# Patient Record
Sex: FEMALE | Race: WHITE | NOT HISPANIC OR LATINO | Employment: OTHER | URBAN - NONMETROPOLITAN AREA
[De-identification: names, ages, dates, MRNs, and addresses within clinical notes are randomized per-mention and may not be internally consistent; named-entity substitution may affect disease eponyms.]

---

## 2017-04-09 ENCOUNTER — APPOINTMENT (OUTPATIENT)
Dept: CT IMAGING | Facility: HOSPITAL | Age: 67
End: 2017-04-09

## 2017-04-09 ENCOUNTER — APPOINTMENT (OUTPATIENT)
Dept: GENERAL RADIOLOGY | Facility: HOSPITAL | Age: 67
End: 2017-04-09

## 2017-04-09 ENCOUNTER — HOSPITAL ENCOUNTER (INPATIENT)
Facility: HOSPITAL | Age: 67
LOS: 3 days | Discharge: NURSING, REHAB OR LONG-TERM CARE | End: 2017-04-12
Attending: EMERGENCY MEDICINE | Admitting: PSYCHIATRY & NEUROLOGY

## 2017-04-09 DIAGNOSIS — I63.312 CEREBROVASCULAR ACCIDENT (CVA) DUE TO THROMBOSIS OF LEFT MIDDLE CEREBRAL ARTERY (HCC): Primary | ICD-10-CM

## 2017-04-09 DIAGNOSIS — R13.10 DYSPHAGIA, UNSPECIFIED TYPE: ICD-10-CM

## 2017-04-09 DIAGNOSIS — R26.89 IMPAIRED GAIT AND MOBILITY: ICD-10-CM

## 2017-04-09 DIAGNOSIS — Z78.9 DECREASED ACTIVITIES OF DAILY LIVING (ADL): ICD-10-CM

## 2017-04-09 PROBLEM — I63.9 STROKE-IN-EVOLUTION SYNDROME (HCC): Status: ACTIVE | Noted: 2017-04-09

## 2017-04-09 LAB
ABO GROUP BLD: NORMAL
ALBUMIN SERPL-MCNC: 4.3 G/DL (ref 3.5–5)
ALBUMIN/GLOB SERPL: 1.3 G/DL (ref 1.1–2.5)
ALP SERPL-CCNC: 93 U/L (ref 24–120)
ALT SERPL W P-5'-P-CCNC: 21 U/L (ref 0–54)
ANION GAP SERPL CALCULATED.3IONS-SCNC: 16 MMOL/L (ref 4–13)
APTT PPP: 28.4 SECONDS (ref 24.1–34.8)
AST SERPL-CCNC: 33 U/L (ref 7–45)
BASOPHILS # BLD AUTO: 0.03 10*3/MM3 (ref 0–0.2)
BASOPHILS NFR BLD AUTO: 0.3 % (ref 0–2)
BILIRUB SERPL-MCNC: 0.6 MG/DL (ref 0.1–1)
BLD GP AB SCN SERPL QL: NEGATIVE
BUN BLD-MCNC: 23 MG/DL (ref 5–21)
BUN/CREAT SERPL: 22.3 (ref 7–25)
CALCIUM SPEC-SCNC: 9.5 MG/DL (ref 8.4–10.4)
CHLORIDE SERPL-SCNC: 99 MMOL/L (ref 98–110)
CO2 SERPL-SCNC: 26 MMOL/L (ref 24–31)
CREAT BLD-MCNC: 1.03 MG/DL (ref 0.5–1.4)
DEPRECATED RDW RBC AUTO: 40.3 FL (ref 40–54)
EOSINOPHIL # BLD AUTO: 0.09 10*3/MM3 (ref 0–0.7)
EOSINOPHIL NFR BLD AUTO: 0.8 % (ref 0–4)
ERYTHROCYTE [DISTWIDTH] IN BLOOD BY AUTOMATED COUNT: 12.7 % (ref 12–15)
GFR SERPL CREATININE-BSD FRML MDRD: 53 ML/MIN/1.73
GLOBULIN UR ELPH-MCNC: 3.2 GM/DL
GLUCOSE BLD-MCNC: 96 MG/DL (ref 70–100)
GLUCOSE BLDC GLUCOMTR-MCNC: 127 MG/DL (ref 70–130)
GLUCOSE BLDC GLUCOMTR-MCNC: 88 MG/DL (ref 70–130)
HCT VFR BLD AUTO: 43.6 % (ref 37–47)
HGB BLD-MCNC: 15.3 G/DL (ref 12–16)
HOLD SPECIMEN: NORMAL
HOLD SPECIMEN: NORMAL
IMM GRANULOCYTES # BLD: 0.03 10*3/MM3 (ref 0–0.03)
IMM GRANULOCYTES NFR BLD: 0.3 % (ref 0–5)
INR PPP: 0.91 (ref 0.91–1.09)
LYMPHOCYTES # BLD AUTO: 3.68 10*3/MM3 (ref 0.72–4.86)
LYMPHOCYTES NFR BLD AUTO: 30.8 % (ref 15–45)
MCH RBC QN AUTO: 30.4 PG (ref 28–32)
MCHC RBC AUTO-ENTMCNC: 35.1 G/DL (ref 33–36)
MCV RBC AUTO: 86.5 FL (ref 82–98)
MONOCYTES # BLD AUTO: 0.78 10*3/MM3 (ref 0.19–1.3)
MONOCYTES NFR BLD AUTO: 6.5 % (ref 4–12)
NEUTROPHILS # BLD AUTO: 7.35 10*3/MM3 (ref 1.87–8.4)
NEUTROPHILS NFR BLD AUTO: 61.3 % (ref 39–78)
PLATELET # BLD AUTO: 354 10*3/MM3 (ref 130–400)
PMV BLD AUTO: 9.5 FL (ref 6–12)
POTASSIUM BLD-SCNC: 4 MMOL/L (ref 3.5–5.3)
PROT SERPL-MCNC: 7.5 G/DL (ref 6.3–8.7)
PROTHROMBIN TIME: 12.5 SECONDS (ref 11.9–14.6)
RBC # BLD AUTO: 5.04 10*6/MM3 (ref 4.2–5.4)
RH BLD: POSITIVE
SODIUM BLD-SCNC: 141 MMOL/L (ref 135–145)
TROPONIN I SERPL-MCNC: <0.012 NG/ML (ref 0–0.03)
WBC NRBC COR # BLD: 11.96 10*3/MM3 (ref 4.8–10.8)
WHOLE BLOOD HOLD SPECIMEN: NORMAL
WHOLE BLOOD HOLD SPECIMEN: NORMAL

## 2017-04-09 PROCEDURE — 25010000002 LORAZEPAM PER 2 MG: Performed by: PSYCHIATRY & NEUROLOGY

## 2017-04-09 PROCEDURE — 85730 THROMBOPLASTIN TIME PARTIAL: CPT | Performed by: EMERGENCY MEDICINE

## 2017-04-09 PROCEDURE — 70450 CT HEAD/BRAIN W/O DYE: CPT

## 2017-04-09 PROCEDURE — 25010000002 ONDANSETRON PER 1 MG: Performed by: PSYCHIATRY & NEUROLOGY

## 2017-04-09 PROCEDURE — 82962 GLUCOSE BLOOD TEST: CPT

## 2017-04-09 PROCEDURE — 99291 CRITICAL CARE FIRST HOUR: CPT

## 2017-04-09 PROCEDURE — 86901 BLOOD TYPING SEROLOGIC RH(D): CPT | Performed by: EMERGENCY MEDICINE

## 2017-04-09 PROCEDURE — 72132 CT LUMBAR SPINE W/DYE: CPT

## 2017-04-09 PROCEDURE — 84484 ASSAY OF TROPONIN QUANT: CPT | Performed by: EMERGENCY MEDICINE

## 2017-04-09 PROCEDURE — 85025 COMPLETE CBC W/AUTO DIFF WBC: CPT | Performed by: EMERGENCY MEDICINE

## 2017-04-09 PROCEDURE — 93005 ELECTROCARDIOGRAM TRACING: CPT | Performed by: EMERGENCY MEDICINE

## 2017-04-09 PROCEDURE — 25010000002 ALTEPLASE PER 1 MG: Performed by: EMERGENCY MEDICINE

## 2017-04-09 PROCEDURE — 3E03317 INTRODUCTION OF OTHER THROMBOLYTIC INTO PERIPHERAL VEIN, PERCUTANEOUS APPROACH: ICD-10-PCS | Performed by: PSYCHIATRY & NEUROLOGY

## 2017-04-09 PROCEDURE — 71010 HC CHEST PA OR AP: CPT

## 2017-04-09 PROCEDURE — 72129 CT CHEST SPINE W/DYE: CPT

## 2017-04-09 PROCEDURE — 74174 CTA ABD&PLVS W/CONTRAST: CPT

## 2017-04-09 PROCEDURE — 99223 1ST HOSP IP/OBS HIGH 75: CPT | Performed by: PSYCHIATRY & NEUROLOGY

## 2017-04-09 PROCEDURE — 0 IOPAMIDOL PER 1 ML: Performed by: EMERGENCY MEDICINE

## 2017-04-09 PROCEDURE — 85610 PROTHROMBIN TIME: CPT | Performed by: EMERGENCY MEDICINE

## 2017-04-09 PROCEDURE — 93010 ELECTROCARDIOGRAM REPORT: CPT | Performed by: INTERNAL MEDICINE

## 2017-04-09 PROCEDURE — 99285 EMERGENCY DEPT VISIT HI MDM: CPT

## 2017-04-09 PROCEDURE — 86900 BLOOD TYPING SEROLOGIC ABO: CPT | Performed by: EMERGENCY MEDICINE

## 2017-04-09 PROCEDURE — 25010000002 ONDANSETRON PER 1 MG: Performed by: EMERGENCY MEDICINE

## 2017-04-09 PROCEDURE — 93005 ELECTROCARDIOGRAM TRACING: CPT

## 2017-04-09 PROCEDURE — 25010000002 HYDROMORPHONE PER 4 MG: Performed by: EMERGENCY MEDICINE

## 2017-04-09 PROCEDURE — 86850 RBC ANTIBODY SCREEN: CPT | Performed by: EMERGENCY MEDICINE

## 2017-04-09 PROCEDURE — 80053 COMPREHEN METABOLIC PANEL: CPT | Performed by: EMERGENCY MEDICINE

## 2017-04-09 RX ORDER — SODIUM CHLORIDE 0.9 % (FLUSH) 0.9 %
1-10 SYRINGE (ML) INJECTION AS NEEDED
Status: DISCONTINUED | OUTPATIENT
Start: 2017-04-09 | End: 2017-04-12 | Stop reason: HOSPADM

## 2017-04-09 RX ORDER — SODIUM CHLORIDE 9 MG/ML
100 INJECTION, SOLUTION INTRAVENOUS ONCE
Status: COMPLETED | OUTPATIENT
Start: 2017-04-09 | End: 2017-04-09

## 2017-04-09 RX ORDER — ASPIRIN 300 MG/1
300 SUPPOSITORY RECTAL DAILY
Status: DISCONTINUED | OUTPATIENT
Start: 2017-04-10 | End: 2017-04-12 | Stop reason: HOSPADM

## 2017-04-09 RX ORDER — LORAZEPAM 2 MG/ML
0.5 INJECTION INTRAMUSCULAR
Status: DISCONTINUED | OUTPATIENT
Start: 2017-04-09 | End: 2017-04-11

## 2017-04-09 RX ORDER — ALBUTEROL SULFATE 90 UG/1
1 AEROSOL, METERED RESPIRATORY (INHALATION) EVERY 4 HOURS PRN
COMMUNITY
End: 2017-04-09

## 2017-04-09 RX ORDER — ATORVASTATIN CALCIUM 40 MG/1
80 TABLET, FILM COATED ORAL NIGHTLY
Status: DISCONTINUED | OUTPATIENT
Start: 2017-04-09 | End: 2017-04-12 | Stop reason: HOSPADM

## 2017-04-09 RX ORDER — ASPIRIN 325 MG
325 TABLET ORAL DAILY
Status: DISCONTINUED | OUTPATIENT
Start: 2017-04-10 | End: 2017-04-12 | Stop reason: HOSPADM

## 2017-04-09 RX ORDER — HYDRALAZINE HYDROCHLORIDE 20 MG/ML
20 INJECTION INTRAMUSCULAR; INTRAVENOUS EVERY 6 HOURS PRN
Status: DISCONTINUED | OUTPATIENT
Start: 2017-04-09 | End: 2017-04-12 | Stop reason: HOSPADM

## 2017-04-09 RX ORDER — ONDANSETRON 2 MG/ML
4 INJECTION INTRAMUSCULAR; INTRAVENOUS
Status: DISCONTINUED | OUTPATIENT
Start: 2017-04-09 | End: 2017-04-11

## 2017-04-09 RX ORDER — ONDANSETRON 2 MG/ML
4 INJECTION INTRAMUSCULAR; INTRAVENOUS ONCE
Status: COMPLETED | OUTPATIENT
Start: 2017-04-09 | End: 2017-04-09

## 2017-04-09 RX ORDER — SODIUM CHLORIDE 0.9 % (FLUSH) 0.9 %
10 SYRINGE (ML) INJECTION AS NEEDED
Status: DISCONTINUED | OUTPATIENT
Start: 2017-04-09 | End: 2017-04-11

## 2017-04-09 RX ADMIN — ALTEPLASE 48.28 MG: KIT at 15:19

## 2017-04-09 RX ADMIN — HYDROMORPHONE HYDROCHLORIDE 1 MG: 1 INJECTION, SOLUTION INTRAMUSCULAR; INTRAVENOUS; SUBCUTANEOUS at 16:15

## 2017-04-09 RX ADMIN — ONDANSETRON HYDROCHLORIDE 4 MG: 2 SOLUTION INTRAMUSCULAR; INTRAVENOUS at 16:05

## 2017-04-09 RX ADMIN — ALTEPLASE 5.36 MG: KIT at 15:17

## 2017-04-09 RX ADMIN — IOPAMIDOL 150 ML: 755 INJECTION, SOLUTION INTRAVENOUS at 16:42

## 2017-04-09 RX ADMIN — LORAZEPAM 0.5 MG: 2 INJECTION, SOLUTION INTRAMUSCULAR; INTRAVENOUS at 22:36

## 2017-04-09 RX ADMIN — SODIUM CHLORIDE 100 ML/HR: 9 INJECTION, SOLUTION INTRAVENOUS at 15:25

## 2017-04-09 RX ADMIN — ONDANSETRON HYDROCHLORIDE 4 MG: 2 SOLUTION INTRAMUSCULAR; INTRAVENOUS at 19:18

## 2017-04-09 NOTE — PLAN OF CARE
Problem: Patient Care Overview (Adult)  Goal: Plan of Care Review  Outcome: Ongoing (interventions implemented as appropriate)    04/09/17 1808   Outcome Evaluation   Outcome Summary/Follow up Plan Patient admitted with ischemic stroke. tPA administered in ED. Stopped at 1608 for possible bleed (abd and low back pain). CT abd/pelvis negative. Stil some sensory loss to right side of face. Strength returning to right side. Complaints of nausea without vomiting. Urinates via bedpan       Goal: Adult Individualization and Mutuality  Outcome: Ongoing (interventions implemented as appropriate)  Goal: Discharge Needs Assessment  Outcome: Ongoing (interventions implemented as appropriate)    Problem: Stroke (Ischemic) (Adult)  Goal: Signs and Symptoms of Listed Potential Problems Will be Absent or Manageable (Stroke)  Outcome: Ongoing (interventions implemented as appropriate)    Problem: Fall Risk (Adult)  Goal: Identify Related Risk Factors and Signs and Symptoms  Outcome: Ongoing (interventions implemented as appropriate)  Goal: Absence of Falls  Outcome: Ongoing (interventions implemented as appropriate)

## 2017-04-09 NOTE — ED NOTES
PT PRESENTS TO ER CO RIGHT SIDED NUMBNESS STARTED 1130 WHILE COOKING.  REPORTS NUMBNESS TO RIGHT SIDE OF FACE, RUE, & RLE.  WEAKNESS NOTED IN RIGHT .       Masha Lau RN  04/09/17 9093

## 2017-04-09 NOTE — ED NOTES
Pt became very anxious. Diaphoretic.  Nausea.  Pt co lower abd pain radiating into back.  Pt reports that she cant feel her right leg.  Pt raises leg from bed but falls to bed immediately.  Dr wynne called to bedside. TPA stopped per Dr Wynne order.  Verbal order for pain med & CT scan.  Pt to be transferred to CT Stat per Dr Wynne.     Masha Lau RN  04/09/17 4504       Masha Lau RN  04/09/17 1205

## 2017-04-09 NOTE — ED PROVIDER NOTES
Subjective   HPI Comments: i Just saw the pt at 2 15 pm has onset at 1130 -12 pm with co rt sided weakness and numbness    pt was at home and had onset of numbness in the rt face and rt arm and leg did not co weakness no loc and then stayed at home about two hours after the onset of symptoms and then came to the er . With co numbness . On my examination it was fet that she had rt sided upper ext weakness which was barely perceptable . ot has ho ha so this could be a varient migraine vs a cva thus a code stroke was called     Patient is a 66 y.o. female presenting with strokes.   Stroke   Presenting symptoms: sensory loss and weakness    Presenting symptoms: no change in level of consciousness, no confusion, no headaches and no language symptoms    Onset quality:  Sudden  Last known well:  1130am -12 pm  Timing:  Constant  Progression:  Unchanged  Similar to previous episodes: no    Associated symptoms: no chest pain, no difficulty swallowing, no dizziness, no facial pain, no fall, no fever, no hearing loss, no bladder incontinence, no nausea, no neck pain, no paresthesias, no seizures, no tinnitus, no vertigo and no vomiting        Review of Systems   Constitutional: Negative for fever.   HENT: Negative.  Negative for hearing loss, tinnitus and trouble swallowing.    Eyes: Negative.    Respiratory: Negative.  Negative for cough and chest tightness.    Cardiovascular: Negative.  Negative for chest pain.   Gastrointestinal: Negative.  Negative for abdominal pain, anal bleeding, nausea and vomiting.   Genitourinary: Negative for bladder incontinence, dysuria, flank pain, hematuria and vaginal bleeding.   Musculoskeletal: Negative.  Negative for back pain, gait problem, joint swelling and neck pain.   Skin: Negative.    Neurological: Positive for weakness and numbness. Negative for dizziness, vertigo, tremors, seizures, syncope, facial asymmetry, speech difficulty, light-headedness, headaches and paresthesias.    Psychiatric/Behavioral: Negative for confusion.   All other systems reviewed and are negative.      Past Medical History:   Diagnosis Date   • Hypertension        No Known Allergies    Past Surgical History:   Procedure Laterality Date   • GALLBLADDER SURGERY     • TUBAL ABDOMINAL LIGATION         Family History   Problem Relation Age of Onset   • COPD Mother    • Kidney disease Father    • Cancer Sister    • Stroke Brother        Social History     Social History   • Marital status:      Spouse name: N/A   • Number of children: N/A   • Years of education: N/A     Social History Main Topics   • Smoking status: Never Smoker   • Smokeless tobacco: None   • Alcohol use No   • Drug use: No   • Sexual activity: Not Asked     Other Topics Concern   • None     Social History Narrative           Objective   Physical Exam   Constitutional: She is oriented to person, place, and time. She appears well-developed.  Non-toxic appearance. No distress.   HENT:   Head: Normocephalic and atraumatic.   Mouth/Throat: Uvula is midline and mucous membranes are normal.   Eyes: Lids are normal. Pupils are equal, round, and reactive to light. Lids are everted and swept, no foreign bodies found.   Neck: Trachea normal, normal range of motion, full passive range of motion without pain and phonation normal. Neck supple. Normal carotid pulses and no JVD present. Carotid bruit is not present. No rigidity. No tracheal deviation present. No Brudzinski's sign and no Kernig's sign noted. No thyromegaly present.   Cardiovascular: Normal rate, regular rhythm, normal heart sounds, intact distal pulses and normal pulses.    Pulmonary/Chest: Effort normal and breath sounds normal. No stridor. No apnea and no tachypnea. No respiratory distress. She has no wheezes. She has no rales.   Abdominal: Soft. Normal appearance, normal aorta and bowel sounds are normal. She exhibits no distension and no mass. There is no hepatosplenomegaly. There is no  tenderness. There is no rebound and no guarding.   Musculoskeletal: Normal range of motion.        Thoracic back: Normal. She exhibits no tenderness and no bony tenderness.        Lumbar back: Normal. She exhibits no tenderness and no bony tenderness.       Vascular Status -  Her exam exhibits right foot vasculature normal. Her exam exhibits no right foot edema. Her exam exhibits left foot vasculature normal. Her exam exhibits no left foot edema.  Neurological: She is alert and oriented to person, place, and time. She has normal reflexes. She is not disoriented. She displays normal reflexes. A sensory deficit is present. No cranial nerve deficit. She exhibits normal muscle tone. Coordination normal. GCS eye subscore is 4. GCS verbal subscore is 5. GCS motor subscore is 6.   Reflex Scores:       Tricep reflexes are 2+ on the right side and 2+ on the left side.       Bicep reflexes are 2+ on the right side and 2+ on the left side.       Patellar reflexes are 2+ on the right side and 2+ on the left side.       Achilles reflexes are 2+ on the right side and 2+ on the left side.  Rt arm numbness and weakness to rt arm    Skin: Skin is warm, dry and intact. She is not diaphoretic. No cyanosis. No pallor. Nails show no clubbing.   Nursing note and vitals reviewed.      Procedures         ED Course  ED Course   Comment By Time   Nsr Romero Wynne MD 04/09 1428   Discussed with DR Hayder Wynne MD 04/09 1436   Ct per radiology neg Romero Wynne MD 04/09 1438   Dr Singletary seeing the pt tpa will be initiated Romero Wynne MD 04/09 8695   Patient was seen by Dr. Singletary and she requests the patient to get TPA for consent had been Signed by  the patient Romero Wynne MD 04/09 1591   The nurses us informed me that the patient having abdominal pain and back pain and the her right leg has developed more weakness as a TPA of the right leg and right arm weakness and numbness had gone away and now she is having back pain and  abdominal pain there is no history of abdominal pain or back pain and the patient came into the ED she has some history of chronic back discomfort but no instrumentation via stop the [facility] center for a stat CT of the abdominal pelvis with IV contrast aortic dissection and a CT of the L-spine and T-spine to make sure she does not have any epidural hematomas Romero Wynne MD 04/09 1612   No evidence of aortic dissection or aneurysm. There is moderate  stenosis involving the proximal segment of the SMA.  2. There is a 1.5 cm hypodense nodule. This could potentially represent  a diverticulum off the gastric fundus but appears to be in close  proximity to the tail of the pancreas. Follow-up with either ultrasound  or repeat CT imaging with oral contrast is recommended.   3. Hepatic cyst of the liver.  Discussed with family and need for follow up stressed Romero Wynne MD 04/09 1712   Pts family was informed as they were going up to the unit with the pt that cts were neg for any acute abnormality and has a nodule may be in pancreas that needs dedicated evaluation as out pt Romero Wynne MD 04/09 1730                  [facility]    Final diagnoses:   Cerebrovascular accident (CVA) due to thrombosis of left middle cerebral artery            Romero Wynne MD  04/09/17 1505       Romero Wynne MD  04/09/17 1722       Romero Wynne MD  04/09/17 1730

## 2017-04-09 NOTE — H&P
Neurology History & Physical    Indication for Admission: Stroke in evolutoin    History of present illness:      The patient present with symptoms of right sided numbness and tingling that started at 11:30 am, face >arm> leg. No visual changes, no language or speech problems, no nausea, no vertigo. She did not fall.      She has had no recent surgeries.    Patient has history of HTN and dyslipidemia and has not been on statin therapy for several years. She does not smoke.    Past medical history  HTN, dyslipidemia    No known allergies    Social History  She is , she does not use tobacco    Family History  COPD, kidney disease, cancer, stroke    Review of Systems  Review of Systems  Negative for chest pain, shortness of breath, GI or  complaints.    Vital Signs   Temp:  [98.2 °F (36.8 °C)] 98.2 °F (36.8 °C)  Heart Rate:  [108] 108  Resp:  [20] 20  BP: (148)/(99) 148/99    General Exam:  HEENT:  No rash, no sign trauma  CVS:  Regular rate and rhythm.  No murmurs, carotids with bruits  Lungs:  Clear to auscultation  Extremities:  No signs of peripheral edema  Skin:  No rashes or bruises    Neurologic Exam:  Alert, oriented  and fluent  Cranial nerves intact  Visual fields intact  Pupils symmetric and reactive  No ptosis or nystagmus  Power and coordination normal in all extremities  Decreased light touch on right hemibody  Gait not assesed  Plantar responses flexor      Results Review:  Lab Results (last 7 days)     Procedure Component Value Units Date/Time    CBC Auto Differential [[id number]]  (Abnormal) Collected:  04/09/17 1351    Specimen:  Blood Updated:  04/09/17 1443     WBC 11.96 (H) 10*3/mm3      RBC 5.04 10*6/mm3      Hemoglobin 15.3 g/dL      Hematocrit 43.6 %      MCV 86.5 fL      MCH 30.4 pg      MCHC 35.1 g/dL      RDW 12.7 %      RDW-SD 40.3 fl      MPV 9.5 fL      Platelets 354 10*3/mm3      Neutrophil % 61.3 %      Lymphocyte % 30.8 %      Monocyte % 6.5 %      Eosinophil % 0.8 %       Basophil % 0.3 %      Immature Grans % 0.3 %      Neutrophils, Absolute 7.35 10*3/mm3      Lymphocytes, Absolute 3.68 10*3/mm3      Monocytes, Absolute 0.78 10*3/mm3      Eosinophils, Absolute 0.09 10*3/mm3      Basophils, Absolute 0.03 10*3/mm3      Immature Grans, Absolute 0.03 10*3/mm3     Protime-INR [[id number]]  (Normal) Collected:  04/09/17 1351    Specimen:  Blood Updated:  04/09/17 1448     Protime 12.5 Seconds      INR 0.91    aPTT [[id number]]  (Normal) Collected:  04/09/17 1351    Specimen:  Blood Updated:  04/09/17 1448     PTT 28.4 seconds     Comprehensive Metabolic Panel [[id number]]  (Abnormal) Collected:  04/09/17 1351    Specimen:  Blood Updated:  04/09/17 1450     Glucose 96 mg/dL      BUN 23 (H) mg/dL      Creatinine 1.03 mg/dL      Sodium 141 mmol/L      Potassium 4.0 mmol/L      Chloride 99 mmol/L      CO2 26.0 mmol/L      Calcium 9.5 mg/dL      Total Protein 7.5 g/dL      Albumin 4.30 g/dL      ALT (SGPT) 21 U/L      AST (SGOT) 33 U/L      Alkaline Phosphatase 93 U/L      Total Bilirubin 0.6 mg/dL      eGFR Non African Amer 53 (L) mL/min/1.73      Globulin 3.2 gm/dL      A/G Ratio 1.3 g/dL      BUN/Creatinine Ratio 22.3     Anion Gap 16.0 (H) mmol/L     POC Glucose Fingerstick [[id number]]  (Normal) Collected:  04/09/17 1438    Specimen:  Blood Updated:  04/09/17 1459     Glucose 88 mg/dL       : [id number] Sid Flanagan LMeter ID: [id number]       Troponin [[id number]]  (Normal) Collected:  04/09/17 1351    Specimen:  Blood Updated:  04/09/17 1501     Troponin I <0.012 ng/mL         Head CT shows no acute abnormality (images reviewed independently)    Impression:    Acute left hemispheric stroke in evolution  Patient meets inclusion and exclusion criteria for Intravenous TPA  Risks discussed with patient and significant other, and they agree to proceed    Plan:    TPA protocol  Hydralazine as needed for SBP> 190, DBP >100    Kera Singletary MD  04/09/17  3:03 PM

## 2017-04-10 ENCOUNTER — APPOINTMENT (OUTPATIENT)
Dept: CARDIOLOGY | Facility: HOSPITAL | Age: 67
End: 2017-04-10
Attending: PSYCHIATRY & NEUROLOGY

## 2017-04-10 ENCOUNTER — APPOINTMENT (OUTPATIENT)
Dept: CT IMAGING | Facility: HOSPITAL | Age: 67
End: 2017-04-10

## 2017-04-10 ENCOUNTER — APPOINTMENT (OUTPATIENT)
Dept: MRI IMAGING | Facility: HOSPITAL | Age: 67
End: 2017-04-10

## 2017-04-10 ENCOUNTER — APPOINTMENT (OUTPATIENT)
Dept: ULTRASOUND IMAGING | Facility: HOSPITAL | Age: 67
End: 2017-04-10

## 2017-04-10 LAB
ARTICHOKE IGE QN: 81 MG/DL (ref 0–99)
BH CV ECHO MEAS - AO MAX PG (FULL): 3.6 MMHG
BH CV ECHO MEAS - AO MAX PG: 6.2 MMHG
BH CV ECHO MEAS - AO MEAN PG (FULL): 2 MMHG
BH CV ECHO MEAS - AO MEAN PG: 3 MMHG
BH CV ECHO MEAS - AO ROOT AREA (BSA CORRECTED): 1.7
BH CV ECHO MEAS - AO ROOT AREA: 5.3 CM^2
BH CV ECHO MEAS - AO ROOT DIAM: 2.6 CM
BH CV ECHO MEAS - AO V2 MAX: 124 CM/SEC
BH CV ECHO MEAS - AO V2 MEAN: 81.5 CM/SEC
BH CV ECHO MEAS - AO V2 VTI: 20.9 CM
BH CV ECHO MEAS - AVA(I,A): 2.2 CM^2
BH CV ECHO MEAS - AVA(I,D): 2.2 CM^2
BH CV ECHO MEAS - AVA(V,A): 2 CM^2
BH CV ECHO MEAS - AVA(V,D): 2 CM^2
BH CV ECHO MEAS - BSA(HAYCOCK): 1.6 M^2
BH CV ECHO MEAS - BSA: 1.5 M^2
BH CV ECHO MEAS - BZI_BMI: 26.1 KILOGRAMS/M^2
BH CV ECHO MEAS - BZI_METRIC_HEIGHT: 149.9 CM
BH CV ECHO MEAS - BZI_METRIC_WEIGHT: 58.5 KG
BH CV ECHO MEAS - CONTRAST EF 4CH: 60.3 ML/M^2
BH CV ECHO MEAS - EDV(CUBED): 64 ML
BH CV ECHO MEAS - EDV(MOD-SP4): 67.3 ML
BH CV ECHO MEAS - EDV(TEICH): 70 ML
BH CV ECHO MEAS - EF(CUBED): 68.9 %
BH CV ECHO MEAS - EF(MOD-SP4): 60.3 %
BH CV ECHO MEAS - EF(TEICH): 61.1 %
BH CV ECHO MEAS - ESV(CUBED): 19.9 ML
BH CV ECHO MEAS - ESV(MOD-SP4): 26.7 ML
BH CV ECHO MEAS - ESV(TEICH): 27.3 ML
BH CV ECHO MEAS - FS: 32.3 %
BH CV ECHO MEAS - IVS/LVPW: 0.98
BH CV ECHO MEAS - IVSD: 1 CM
BH CV ECHO MEAS - LA DIMENSION: 2.7 CM
BH CV ECHO MEAS - LA/AO: 1
BH CV ECHO MEAS - LAT PEAK E' VEL: 6.7 CM/SEC
BH CV ECHO MEAS - LV DIASTOLIC VOL/BSA (35-75): 44 ML/M^2
BH CV ECHO MEAS - LV MASS(C)D: 134.3 GRAMS
BH CV ECHO MEAS - LV MASS(C)DI: 87.8 GRAMS/M^2
BH CV ECHO MEAS - LV MAX PG: 2.5 MMHG
BH CV ECHO MEAS - LV MEAN PG: 1 MMHG
BH CV ECHO MEAS - LV SYSTOLIC VOL/BSA (12-30): 17.4 ML/M^2
BH CV ECHO MEAS - LV V1 MAX: 79.1 CM/SEC
BH CV ECHO MEAS - LV V1 MEAN: 51.4 CM/SEC
BH CV ECHO MEAS - LV V1 VTI: 14.6 CM
BH CV ECHO MEAS - LVIDD: 4 CM
BH CV ECHO MEAS - LVIDS: 2.7 CM
BH CV ECHO MEAS - LVLD AP4: 6.1 CM
BH CV ECHO MEAS - LVLS AP4: 5.1 CM
BH CV ECHO MEAS - LVOT AREA (M): 3.1 CM^2
BH CV ECHO MEAS - LVOT AREA: 3.1 CM^2
BH CV ECHO MEAS - LVOT DIAM: 2 CM
BH CV ECHO MEAS - LVPWD: 1.1 CM
BH CV ECHO MEAS - MED PEAK E' VEL: 5.98 CM/SEC
BH CV ECHO MEAS - MV A MAX VEL: 94.1 CM/SEC
BH CV ECHO MEAS - MV DEC TIME: 0.24 SEC
BH CV ECHO MEAS - MV E MAX VEL: 54.8 CM/SEC
BH CV ECHO MEAS - MV E/A: 0.58
BH CV ECHO MEAS - RAP SYSTOLE: 10 MMHG
BH CV ECHO MEAS - RVSP: 26 MMHG
BH CV ECHO MEAS - SI(AO): 72.5 ML/M^2
BH CV ECHO MEAS - SI(CUBED): 28.8 ML/M^2
BH CV ECHO MEAS - SI(LVOT): 30 ML/M^2
BH CV ECHO MEAS - SI(MOD-SP4): 26.5 ML/M^2
BH CV ECHO MEAS - SI(TEICH): 27.9 ML/M^2
BH CV ECHO MEAS - SV(AO): 111 ML
BH CV ECHO MEAS - SV(CUBED): 44.1 ML
BH CV ECHO MEAS - SV(LVOT): 45.9 ML
BH CV ECHO MEAS - SV(MOD-SP4): 40.6 ML
BH CV ECHO MEAS - SV(TEICH): 42.7 ML
BH CV ECHO MEAS - TR MAX VEL: 200 CM/SEC
CHOLEST SERPL-MCNC: 169 MG/DL (ref 130–200)
GLUCOSE BLDC GLUCOMTR-MCNC: 103 MG/DL (ref 70–130)
GLUCOSE BLDC GLUCOMTR-MCNC: 195 MG/DL (ref 70–130)
HBA1C MFR BLD: 5.4 %
HDLC SERPL-MCNC: 55 MG/DL
LDLC/HDLC SERPL: 1.56 {RATIO}
LEFT ATRIUM VOLUME INDEX: 16.8 ML/M2
LEFT ATRIUM VOLUME: 25.7 CM3
TRIGL SERPL-MCNC: 140 MG/DL (ref 0–149)

## 2017-04-10 PROCEDURE — 80061 LIPID PANEL: CPT | Performed by: PSYCHIATRY & NEUROLOGY

## 2017-04-10 PROCEDURE — 70551 MRI BRAIN STEM W/O DYE: CPT

## 2017-04-10 PROCEDURE — G8996 SWALLOW CURRENT STATUS: HCPCS

## 2017-04-10 PROCEDURE — 25010000002 LORAZEPAM PER 2 MG: Performed by: PSYCHIATRY & NEUROLOGY

## 2017-04-10 PROCEDURE — 93306 TTE W/DOPPLER COMPLETE: CPT

## 2017-04-10 PROCEDURE — 70450 CT HEAD/BRAIN W/O DYE: CPT

## 2017-04-10 PROCEDURE — 82962 GLUCOSE BLOOD TEST: CPT

## 2017-04-10 PROCEDURE — 93306 TTE W/DOPPLER COMPLETE: CPT | Performed by: INTERNAL MEDICINE

## 2017-04-10 PROCEDURE — 93880 EXTRACRANIAL BILAT STUDY: CPT | Performed by: SURGERY

## 2017-04-10 PROCEDURE — 93880 EXTRACRANIAL BILAT STUDY: CPT

## 2017-04-10 PROCEDURE — 99233 SBSQ HOSP IP/OBS HIGH 50: CPT | Performed by: PSYCHIATRY & NEUROLOGY

## 2017-04-10 PROCEDURE — 92610 EVALUATE SWALLOWING FUNCTION: CPT

## 2017-04-10 PROCEDURE — 83036 HEMOGLOBIN GLYCOSYLATED A1C: CPT | Performed by: PSYCHIATRY & NEUROLOGY

## 2017-04-10 PROCEDURE — 25010000002 ONDANSETRON PER 1 MG: Performed by: PSYCHIATRY & NEUROLOGY

## 2017-04-10 PROCEDURE — G8997 SWALLOW GOAL STATUS: HCPCS

## 2017-04-10 RX ORDER — ACETAMINOPHEN 325 MG/1
650 TABLET ORAL EVERY 6 HOURS PRN
Status: DISCONTINUED | OUTPATIENT
Start: 2017-04-10 | End: 2017-04-12 | Stop reason: HOSPADM

## 2017-04-10 RX ORDER — LISINOPRIL 10 MG/1
10 TABLET ORAL
Status: DISCONTINUED | OUTPATIENT
Start: 2017-04-10 | End: 2017-04-12 | Stop reason: HOSPADM

## 2017-04-10 RX ADMIN — DESMOPRESSIN ACETATE 80 MG: 0.2 TABLET ORAL at 21:43

## 2017-04-10 RX ADMIN — LORAZEPAM 0.5 MG: 2 INJECTION, SOLUTION INTRAMUSCULAR; INTRAVENOUS at 10:25

## 2017-04-10 RX ADMIN — ACETAMINOPHEN 650 MG: 325 TABLET ORAL at 19:48

## 2017-04-10 RX ADMIN — ACETAMINOPHEN 650 MG: 325 TABLET ORAL at 10:25

## 2017-04-10 RX ADMIN — ONDANSETRON HYDROCHLORIDE 4 MG: 2 SOLUTION INTRAMUSCULAR; INTRAVENOUS at 10:30

## 2017-04-10 RX ADMIN — ASPIRIN 325 MG: 325 TABLET, COATED ORAL at 17:20

## 2017-04-10 RX ADMIN — LORAZEPAM 0.5 MG: 2 INJECTION, SOLUTION INTRAMUSCULAR; INTRAVENOUS at 19:49

## 2017-04-10 RX ADMIN — LISINOPRIL 10 MG: 10 TABLET ORAL at 11:04

## 2017-04-10 NOTE — PROGRESS NOTES
Neurology Progress Note      Subjective     Subjective:  Patient reports still has abnormal sensation of the right body.  She did develop anxiety and apparent increased weakness last night.  Nursing called and stat head CT was performed.  CT was negative  and  lorazepam was then used for anxiety.      Medications:  Current Facility-Administered Medications   Medication Dose Route Frequency Provider Last Rate Last Dose   • aspirin tablet 325 mg  325 mg Oral Daily Kera Singletary MD        Or   • aspirin suppository 300 mg  300 mg Rectal Daily Kera Singletary MD       • atorvastatin (LIPITOR) tablet 80 mg  80 mg Oral Nightly Kera Singletary MD       • hydrALAZINE (APRESOLINE) injection 20 mg  20 mg Intravenous Q6H PRN Kera Singletary MD       • LORazepam (ATIVAN) injection 0.5 mg  0.5 mg Intravenous Q2H PRN Kera Singletary MD   0.5 mg at 04/09/17 2236   • ondansetron (ZOFRAN) injection 4 mg  4 mg Intravenous Q3H PRN Kera Singletary MD   4 mg at 04/09/17 1918   • sodium chloride 0.9 % flush 1-10 mL  1-10 mL Intravenous PRN Kera Singletary MD       • sodium chloride 0.9 % flush 10 mL  10 mL Intravenous PRN Romero Wynne MD       • sodium chloride 0.9 % flush 10 mL  10 mL Intravenous PRN Romero Wynne MD           Review of Systems:   -A 14 point review of systems is completed and is negative.      Objective      Vital Signs  Temp:  [95.8 °F (35.4 °C)-98.2 °F (36.8 °C)] 97.9 °F (36.6 °C)  Heart Rate:  [] 101  Resp:  [12-25] 15  BP: (104-178)/() 142/84    Physical Exam:    HEENT:  No rash  CVS:  Regular rate and rhythm.    Chest: Lungs are clear  Abdomen: Soft and nontender  Extremities:  No signs of peripheral edema    Neurologic Exam:  Alert oriented and fluent  Face is symmetric, no dysarthria  Right upper extremity 5 -/5  Right lower extremity 5 -/5  Left upper and lower extremities 5/5     Results Review:    Repeat head CT showed no acute abnormalities.  Lipid panel pending  Glucose  127      Assessment/Plan       Impression:  Acute left hemispheric stroke status post TPA with still some residual right hemibody findings  Risk factors for stroke include history of dyslipidemia and  Mild hypertension is also likely contributing to stroke risks    Plan:  Brain MRI  Echocardiogram  Carotid Dopplers  Aspirin 325 mg daily  Lipitor 80 mg daily  Start lisinopril 10 mg daily  PT/OT/speech therapy      Kera Singletary MD  04/10/17  9:33 AM

## 2017-04-10 NOTE — DISCHARGE PLACEMENT REQUEST
"JOVITA BURTON [phone]    Azar Capellan (66 y.o. Female)     Date of Birth Social Security Number Address Home Phone MRN    1950  [address]  [facility] [address] [phone] [id number]    [place] Marital Status          Non-Shinto        Admission Date Admission Type Admitting Provider Attending Provider Department, Room/Bed    4/9/17 Emergency Kera Singletary MD [place], [place] B, MD [facility] INTENSIVE CARE, I001/1    Discharge Date Discharge Disposition Discharge Destination                      Attending Provider: Kera Singletary MD     Allergies:  No Known Allergies    Isolation:  None   Infection:  None   Code Status:  FULL    Ht:  59\" (149.9 cm)   Wt:  129 lb (58.5 kg)    Admission Cmt:  None   Principal Problem:  None                Active Insurance as of 4/9/2017     Primary Coverage     Payor Plan Insurance Group Employer/Plan Group    MEDICARE MEDICARE A & B      Payor Plan Address Payor Plan Phone Number Effective From Effective To    [address] [phone] 7/1/2013     [place] [address]       Subscriber Name Subscriber Birth Date Member ID       AZAR CAPELLAN 1950 [id number]           Secondary Coverage     Payor Plan Insurance Group Employer/Plan Group    [employer] [employer] [place]      Payor Plan Address Payor Plan Phone Number Effective From Effective To    [employer] [place] [phone] 8/20/2013     [place] [id number]       Subscriber Name Subscriber Birth Date Member ID       AZAR CAPELLAN 1950 [id number]                 Emergency Contacts      (Rel.) Home Phone Work Phone Mobile Phone    Lc Velázquez (Son) -- -- [phone]              "

## 2017-04-10 NOTE — NURSING NOTE
"Pt stated, \" The numbness and tingling is improving and seeming like it is coming and going now. It was worse than is was when I first came in traveling all the way to the inside of my nose, but now I feel that it is getting better but it does not go completely away.\"      The patient has calmed down some and is trying to close her eyes now, as was before she was hollering out and moving all around the bed and having a hard time telling me what was wrong.  "

## 2017-04-10 NOTE — PLAN OF CARE
Problem: Patient Care Overview (Adult)  Goal: Plan of Care Review  Outcome: Ongoing (interventions implemented as appropriate)    04/10/17 1839   Patient Care Overview   Progress improving   Coping/Psychosocial Response Interventions   Plan Of Care Reviewed With Pt A&O x4. 24 hr MRI/CT scan performed. Paraesthesias remain in right extremities. Adequate UOP and oral intake. Vital signs stable.         Goal: Adult Individualization and Mutuality  Outcome: Ongoing (interventions implemented as appropriate)  Goal: Discharge Needs Assessment  Outcome: Ongoing (interventions implemented as appropriate)    04/10/17 1839   Discharge Needs Assessment   Readmission Within The Last 30 Days no previous admission in last 30 days         Problem: Stroke (Ischemic) (Adult)  Goal: Signs and Symptoms of Listed Potential Problems Will be Absent or Manageable (Stroke)  Outcome: Ongoing (interventions implemented as appropriate)    04/10/17 1839   Stroke (Ischemic)   Problems Assessed (Stroke (Ischemic)/TIA) all   Problems Present (Stroke (Ischemic)/TIA) muscle tone abnormal;motor/sensory impairment;situational response         Problem: Fall Risk (Adult)  Goal: Identify Related Risk Factors and Signs and Symptoms  Outcome: Ongoing (interventions implemented as appropriate)    04/10/17 1839   Fall Risk   Fall Risk: Related Risk Factors age-related changes;gait/mobility problems;neuro disease/injury;sensory deficits;environment unfamiliar       Goal: Absence of Falls  Outcome: Ongoing (interventions implemented as appropriate)    04/10/17 1839   Fall Risk (Adult)   Absence of Falls making progress toward outcome

## 2017-04-10 NOTE — PROGRESS NOTES
Acute Care - Speech Language Pathology   Swallow Initial Evaluation [facility]     Patient Name: Donya Capellan  : 1950  MRN: [id number]  Today's Date: 4/10/2017        Referring Physician: Dr. Jasso      Admit Date: 2017    SPEECH-LANGUAGE PATHOLOGY EVALUATION - SWALLOW  Subjective: The patient was seen on this date for a Clinical Swallow evaluation.  Patient was alert and cooperative.    Objective: Textures given included thin liquid, nectar thick liquid, honey thick liquid, puree consistency and regular consistency.  Assessment: Difficulties were noted with nectar thick liquid, puree consistency and regular consistency, characterized by Pt was noted to have a pre-existing cough prior to PO trials. The pt stated that this was occuring due to her asthma. The pt completed a full range of consistencies with a delayed cough with pureed and nectar thick consistencies 1x. 2x multiple swallow with regular solid consistencies. ST feels that the 2x delayed coughs were not related to aspiration as the pt did not have any vocal changes or any other overt s/s of aspiration with other PO trials.   SLP Findings:  Patient presents with mild oral dysphagia, without esophageal component.   Recommendations: Diet Textures: thin liquid, regular consistency food.  Medications should be taken whole with thin liquids. May have water and ice between meals after oral care, under staff or family supervision and with the recommended strategies for safe swallowing.   Recommended Strategies: Upright for PO. Oral care before breakfast, after all meals and PRN.  Dysphagia therapy is recommended.   Matthew Noland MS, CFY-SLP 4/10/2017 9:22 AM    Visit Dx:     ICD-10-CM ICD-9-CM   1. Cerebrovascular accident (CVA) due to thrombosis of left middle cerebral artery I63.312 434.01   2. 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 Jasso  -CS        Pertinent History Of Current Problem CT of head w/o contrast 4/09/17- No acute findings. CXR 4/09/17- Lungs clear.  -CS        Current Diet Limitations NPO  -CS        Precautions/Limitations, Vision WFL with corrective lenses  -CS        Precautions/Limitations, Hearing WFL  -CS        Prior Level of Function- Communication functional in all spheres  -CS        Prior Level of Function- Swallowing no diet consistency restrictions  -CS        Plans/Goals Discussed With patient  -CS        Barriers to Rehab none identified  -CS        Clinical Impression    Patient's Goals For Discharge patient did not state  -CS        SLP Swallowing Diagnosis mild dysphagia   WFL  -CS        Rehab Potential/Prognosis, Swallowing good, to achieve stated therapy goals  -CS        Criteria for Skilled Therapeutic Interventions Met skilled criteria for dysphagia intervention met  -CS        FCM, Swallowing 6-->Level 6  -CS        Therapy Frequency PRN  -CS        Predicted Duration Therapy Interv (days) until discharge  -CS        Expected Duration Therapy Session (min) 15-30 minutes  -CS        SLP Diet Recommendation regular textures;thin liquids  -CS        Recommended Feeding/Eating Techniques alternate between small bites and sips of  food/liquid;maintain upright posture during/after eating for 30 mins  -CS        SLP Rec. for Method of Medication Administration meds whole with thin liquid  -CS        Monitor For Signs Of Aspiration cough;gurgly voice;throat clearing  -CS        Anticipated Discharge Disposition home with assist  -CS        Pain Assessment    Pain Assessment No/denies pain  -CS        Oral Motor Structure and Function    Dentition Assessment present and adequate  -CS        Secretion Management WNL/WFL  -CS        Mucosal Quality moist, healthy  -CS        Velar Elevation WFL (within functional limits)  -CS        Volitional Swallow mild to moderate difficulties initiating volitional swallow  -CS        Volitional Cough no difficulties initiating volitional cough  -CS        Oral Musculature General Assessment oral labial impairment  -CS        Oral Labial Strength and Mobility right labial droop;other (see comments)   Slight right sided weakness  -CS        General Feeding/Swallowing Observations    Current Feeding Method NPO  -CS        Respiratory Support Currently in Use nasal cannula in use  -CS        Observations of Self Feeding Skills appropriate self feeding skills observed  -CS        Observations of Posture During Feeding Upright in bed  -CS        Clinical Swallow Exam    Mode of Presentation fed by clinician;self fed;spoon;straw  -CS        Oral Phase Results impaired oral phase, signs of dysfunction present  -CS        Pharyngeal Phase Results no signs/symptoms of pharyngeal impairment  -CS        Summary of Clinical Exam Pt was noted to have a pre-existing cough prior to PO trials. The pt stated that the was occuring due to her asthma. The pt complleted a full range of consistencies with a delayed cough per trial of pureed and nectar thick consistencies. 2x multiple swallow with regular solid consistencies. ST feels that the 2x delayed coughs were not related to aspiration as the pt did not have any vocal changes  or any other overt s/s with other PO trials.  -CS        Swallow Recommendations    Eating Assistance no assistance needed with self eating  -CS        Oral Care oral care with toothbrush and dentifrice BID and PRN  -CS        Modified Eating Strategies upright positioning 90 degrees  -CS        Other Recommendations regular;thin;meds whole  -CS        Recommended Diet regular textures;thin liquids  -CS          User Key  (r) = Recorded By, (t) = Taken By, (c) = Cosigned By    Initials Name Effective Dates     Matthew Noland MS, CFY-SLP 08/02/16 -         EDUCATION  The patient has been educated in the following areas:   Dysphagia (Swallowing Impairment).    SLP Recommendation and Plan  SLP Swallowing Diagnosis: mild dysphagia (WFL)  SLP Diet Recommendation: regular textures, thin liquids  Recommended Feeding/Eating Techniques: alternate between small bites and sips of food/liquid, maintain upright posture during/after eating for 30 mins  SLP Rec. for Method of Medication Administration: meds whole with thin liquid  Monitor For Signs Of Aspiration: cough, gurgly voice, throat clearing     Criteria for Skilled Therapeutic Interventions Met: skilled criteria for dysphagia intervention met  Anticipated Discharge Disposition: home with assist  Rehab Potential/Prognosis, Swallowing: good, to achieve stated therapy goals  Therapy Frequency: PRN             Plan of Care Review  Progress: no change (Initial evaluation)  Outcome Summary/Follow up Plan: CBSE completed. ST recommends a regular diet with thin liquids. RN to monitor for increased congestion. ST will follow for diet tolerance.          IP SLP Goals       04/10/17 0912          Safely Consume Diet    Safely Consume Diet- SLP, Date Established 04/10/17  -CS      Safely Consume Diet- SLP, Time to Achieve by discharge  -CS      Safely Consume Diet- SLP, Additional Goal Pt will tolerate recommended diet w/o any overt s/s of aspiration.  -CS      Safely Consume Diet-  SLP, Outcome goal ongoing  -CS        User Key  (r) = Recorded By, (t) = Taken By, (c) = Cosigned By    Initials Name Provider Type    SOFIA Noland MS, CFY-SLP Speech and Language Pathologist             SLP Outcome Measures (last 72 hours)      SLP Outcome Measures       04/10/17 0900          SLP Outcome Measures    Outcome Measure Used? Adult NOMS  -CS      OTHER    SLP Diagnoses Dysphagia  -CS      FCM Scores    FCM Chosen Swallowing  -CS      Swallowing FCM Score 6  -CS        User Key  (r) = Recorded By, (t) = Taken By, (c) = Cosigned By    Initials Name Effective Dates     Matthew Noland MS, RYLEE-SLP 08/02/16 -            Time Calculation:         Time Calculation- SLP       04/10/17 0920          Time Calculation- SLP    SLP Start Time 0815  -CS      SLP Stop Time 0920  -      SLP Time Calculation (min) 65 min  -CS      SLP Received On 04/10/17  -CS      SLP Goal Re-Cert Due Date 04/20/17  -        User Key  (r) = Recorded By, (t) = Taken By, (c) = Cosigned By    Initials Name Provider Type    SOFIA Noland MS, CFY-SLP Speech and Language Pathologist          Therapy Charges for Today     Code Description Service Date Service Provider Modifiers Qty    21725924903 HC ST SWALLOWING CURRENT STATUS 4/10/2017 Matthew Noland MS CFY-SLP GN, CI 1    46225816312 HC ST SWALLOWING PROJECTED 4/10/2017 Matthew Noland MS CFY-SLP GN, CI 1    75682850250 HC ST EVAL ORAL PHARYNG SWALLOW 4 4/10/2017 Matthew Noland MS CFSTANFORD-SLP GN 1          SLP G-Codes  SLP NOMS Used?: Yes  Functional Limitations: Swallowing  Swallow Current Status (): At least 1 percent but less than 20 percent impaired, limited or restricted  Swallow Goal Status (): At least 1 percent but less than 20 percent impaired, limited or restricted    Matthew Noland MS, RYLEE-SLP  4/10/2017

## 2017-04-10 NOTE — PLAN OF CARE
Problem: Patient Care Overview (Adult)  Goal: Adult Individualization and Mutuality  Outcome: Ongoing (interventions implemented as appropriate)  Pt. With slight improvement of right sided deficit over the course of the shift. OR x4, speech slight slurring, numbness of right face, neck arm and leg noted.  equal PERRL,     04/10/17 0512   Individualization   Patient Specific Goals home self-care         Problem: Stroke (Ischemic) (Adult)  Goal: Signs and Symptoms of Listed Potential Problems Will be Absent or Manageable (Stroke)  Outcome: Ongoing (interventions implemented as appropriate)    Problem: Fall Risk (Adult)  Goal: Identify Related Risk Factors and Signs and Symptoms  Outcome: Ongoing (interventions implemented as appropriate)  Goal: Absence of Falls  Outcome: Ongoing (interventions implemented as appropriate)

## 2017-04-10 NOTE — NURSING NOTE
Went for stat ct of head at 2120 and returned by 2145 for neurological decline. With assist of 2 RNs at bedside.

## 2017-04-10 NOTE — PLAN OF CARE
Problem: Patient Care Overview (Adult)  Goal: Plan of Care Review  Outcome: Ongoing (interventions implemented as appropriate)    04/10/17 0912   Outcome Evaluation   Outcome Summary/Follow up Plan CBSE completed. ST recommends a regular diet with thin liquids. RN to monitor for increased congestion. ST will follow for diet tolerance.   Patient Care Overview   Progress no change  (Initial evaluation)         Problem: Inpatient SLP  Goal: Dysphagia- Patient will safely consume diet as per recommendation with no signs/symptoms of aspiration  Outcome: Ongoing (interventions implemented as appropriate)    04/10/17 0912   Safely Consume Diet   Safely Consume Diet- SLP, Date Established 04/10/17   Safely Consume Diet- SLP, Time to Achieve by discharge   Safely Consume Diet- SLP, Additional Goal Pt will tolerate recommended diet w/o any overt s/s of aspiration.   Safely Consume Diet- SLP, Outcome goal ongoing

## 2017-04-10 NOTE — PROGRESS NOTES
Discharge Planning Assessment  [facility]     Patient Name: Donya Capellan  MRN: [id number]  Today's Date: 4/10/2017    Admit Date: 4/9/2017          Discharge Needs Assessment       04/10/17 0925    Living Environment    Lives With significant other    Living Arrangements apartment;house    Provides Primary Care For no one    Quality Of Family Relationships supportive    Able to Return to Prior Living Arrangements other (see comments)   Agreeable to be referred to [facility]    Discharge Needs Assessment    Concerns To Be Addressed basic needs concerns;home safety concerns    Anticipated Changes Related to Illness inability to care for self    Equipment Currently Used at Home none    Discharge Facility/Level Of Care Needs rehabilitation facility    Transportation Available car;family or friend will provide            Discharge Plan       04/10/17 0928    Case Management/Social Work Plan    Plan [facility]    Additional Comments Spoke with pt and significant other in room to assess for home needs. Pt lives at home with significant other and was independent prior to this illness onset.  Spoke with her about [facility], she is agreeable for referral to be given in case she needs to go there at discharge.  Will inform Nestor from [facility] of referral today [phone].      04/10/17 0949    Case Management/Social Work Plan    Plan [facility]    Additional Comments Spoke with pt and significant other in room to assess for home needs.  Pt lives at home with significant other and was independent before this illness.  Spoke with her about [facility]        Discharge Placement     No information found                Demographic Summary     None            Functional Status     None            Psychosocial     None            Abuse/Neglect     None            Legal     None            Substance Abuse     None            Patient Forms     None          BARNEY Harper

## 2017-04-11 LAB
GLUCOSE BLDC GLUCOMTR-MCNC: 115 MG/DL (ref 70–130)
GLUCOSE BLDC GLUCOMTR-MCNC: 90 MG/DL (ref 70–130)

## 2017-04-11 PROCEDURE — G8987 SELF CARE CURRENT STATUS: HCPCS | Performed by: OCCUPATIONAL THERAPIST

## 2017-04-11 PROCEDURE — 92526 ORAL FUNCTION THERAPY: CPT

## 2017-04-11 PROCEDURE — 97165 OT EVAL LOW COMPLEX 30 MIN: CPT | Performed by: OCCUPATIONAL THERAPIST

## 2017-04-11 PROCEDURE — G8988 SELF CARE GOAL STATUS: HCPCS | Performed by: OCCUPATIONAL THERAPIST

## 2017-04-11 PROCEDURE — G8978 MOBILITY CURRENT STATUS: HCPCS

## 2017-04-11 PROCEDURE — 94760 N-INVAS EAR/PLS OXIMETRY 1: CPT

## 2017-04-11 PROCEDURE — 97535 SELF CARE MNGMENT TRAINING: CPT | Performed by: OCCUPATIONAL THERAPIST

## 2017-04-11 PROCEDURE — 82962 GLUCOSE BLOOD TEST: CPT

## 2017-04-11 PROCEDURE — 99233 SBSQ HOSP IP/OBS HIGH 50: CPT | Performed by: PSYCHIATRY & NEUROLOGY

## 2017-04-11 PROCEDURE — 97162 PT EVAL MOD COMPLEX 30 MIN: CPT

## 2017-04-11 PROCEDURE — 25010000002 LORAZEPAM PER 2 MG: Performed by: PSYCHIATRY & NEUROLOGY

## 2017-04-11 PROCEDURE — 94640 AIRWAY INHALATION TREATMENT: CPT

## 2017-04-11 PROCEDURE — G8979 MOBILITY GOAL STATUS: HCPCS

## 2017-04-11 PROCEDURE — 97110 THERAPEUTIC EXERCISES: CPT | Performed by: OCCUPATIONAL THERAPIST

## 2017-04-11 RX ORDER — MONTELUKAST SODIUM 10 MG/1
10 TABLET ORAL NIGHTLY
Status: DISCONTINUED | OUTPATIENT
Start: 2017-04-11 | End: 2017-04-12 | Stop reason: HOSPADM

## 2017-04-11 RX ORDER — NORTRIPTYLINE HYDROCHLORIDE 10 MG/1
40 CAPSULE ORAL NIGHTLY
Status: DISCONTINUED | OUTPATIENT
Start: 2017-04-11 | End: 2017-04-12 | Stop reason: HOSPADM

## 2017-04-11 RX ORDER — BUDESONIDE 0.25 MG/2ML
0.25 INHALANT ORAL
Status: DISCONTINUED | OUTPATIENT
Start: 2017-04-11 | End: 2017-04-12 | Stop reason: HOSPADM

## 2017-04-11 RX ORDER — LORAZEPAM 2 MG/ML
0.5 INJECTION INTRAMUSCULAR EVERY 4 HOURS PRN
Status: DISCONTINUED | OUTPATIENT
Start: 2017-04-11 | End: 2017-04-12 | Stop reason: HOSPADM

## 2017-04-11 RX ADMIN — DESMOPRESSIN ACETATE 80 MG: 0.2 TABLET ORAL at 20:18

## 2017-04-11 RX ADMIN — ASPIRIN 325 MG: 325 TABLET, COATED ORAL at 09:59

## 2017-04-11 RX ADMIN — LISINOPRIL 10 MG: 10 TABLET ORAL at 09:59

## 2017-04-11 RX ADMIN — MONTELUKAST SODIUM 10 MG: 10 TABLET ORAL at 20:18

## 2017-04-11 RX ADMIN — BUDESONIDE 0.25 MG: 0.25 INHALANT RESPIRATORY (INHALATION) at 20:55

## 2017-04-11 RX ADMIN — NORTRIPTYLINE HYDROCHLORIDE 40 MG: 10 CAPSULE ORAL at 20:18

## 2017-04-11 RX ADMIN — LORAZEPAM 0.5 MG: 2 INJECTION INTRAMUSCULAR; INTRAVENOUS at 19:56

## 2017-04-11 NOTE — PLAN OF CARE
Problem: Patient Care Overview (Adult)  Goal: Plan of Care Review  Outcome: Ongoing (interventions implemented as appropriate)    04/11/17 0922   Outcome Evaluation   Outcome Summary/Follow up Plan OT initial eval completed. Pt supervision for bed mobility with HOB elevated and bed rails. Pt CGA x2 for t/f with HHA x2. Pt min-CGA x2 with HHAx2 for functional mobility. Pt set up assist for grooming in fowlers/long sitting with minimal spillage while brushing teeth. Pt cga/supervision to adjust slipper socks. Skilled OT recommeneded to address adls, t/f, functional mobility, bed mobility, safety, coordination, strength and sensation. Recommended d/c home with assist/HH v. inpatient rehab, pending pt progress.   Patient Care Overview   Progress progress toward functional goals as expected   Coping/Psychosocial Response Interventions   Plan Of Care Reviewed With patient         Problem: Inpatient Occupational Therapy  Goal: Bed Mobility Goal LTG- OT  Outcome: Ongoing (interventions implemented as appropriate)    04/11/17 0922   Bed Mobility OT LTG   Bed Mobility OT LTG, Date Established 04/11/17   Bed Mobility OT LTG, Time to Achieve by discharge   Bed Mobility OT LTG, Activity Type all bed mobility   Bed Mobility OT LTG, [place] Level independent       Goal: Strength Goal LTG- OT  Outcome: Ongoing (interventions implemented as appropriate)    04/11/17 0922   Strength OT LTG   Strength Goal OT LTG, Date Established 04/11/17   Strength Goal OT LTG, Time to Achieve by discharge   Strength Goal OT LTG, Functional Goal Pt will independently maintin BUE AROM HEP to increase strength to 4+/5 and increase independence in ADLs.       Goal: Bathing Goal LTG- OT  Outcome: Ongoing (interventions implemented as appropriate)    04/11/17 0922   Bathing OT LTG   Bathing Goal OT LTG, Date Established 04/11/17   Bathing Goal OT LTG, Time to Achieve by discharge   Bathing Goal OT LTG, Activity Type upper body bathing;lower body  bathing   Bathing Goal OT LTG, [place] Level conditional independence   Bathing Goal OT LTG, Assist Device sponge, long handled;grab bars;tub bench with back  (AE PRN)       Goal: LB Dressing Goal LTG- OT  Outcome: Ongoing (interventions implemented as appropriate)    04/11/17 0922   LB Dressing OT LTG   LB Dressing Goal OT LTG, Date Established 04/11/17   LB Dressing Goal OT LTG, Time to Achieve by discharge   LB Dressing Goal OT LTG, [place] Level conditional independence   LB Dressing Goal OT LTG, Adaptive Equipment reacher;shoe horn, long handled;sock-aid  (AE PRN)       Goal: Coordination Goal LTG- OT  Outcome: Ongoing (interventions implemented as appropriate)    04/11/17 0922   Coordination OT LTG   Coordination OT LTG, Date Established 04/11/17   Coordination OT LTG, Time to Achieve by discharge   Coordination OT LTG, Activity Type FM written ex program;GM written ex program;FM task;GM task   Coordination OT LTG, [place] Level independent

## 2017-04-11 NOTE — PLAN OF CARE
Problem: Patient Care Overview (Adult)  Goal: Plan of Care Review  Outcome: Ongoing (interventions implemented as appropriate)    04/11/17 6057   Outcome Evaluation   Outcome Summary/Follow up Plan pt stil remains to have some rigidity and ataxia in right side, continues to complain about sever back pain and restlessness. possible floor today   Patient Care Overview   Progress no change   Coping/Psychosocial Response Interventions   Plan Of Care Reviewed With patient         Problem: Stroke (Ischemic) (Adult)  Goal: Signs and Symptoms of Listed Potential Problems Will be Absent or Manageable (Stroke)  Outcome: Ongoing (interventions implemented as appropriate)    Problem: Fall Risk (Adult)  Goal: Identify Related Risk Factors and Signs and Symptoms  Outcome: Ongoing (interventions implemented as appropriate)  Goal: Absence of Falls  Outcome: Ongoing (interventions implemented as appropriate)

## 2017-04-11 NOTE — THERAPY DISCHARGE NOTE
Acute Care - Speech Language Pathology   Swallow Eval/Discharge [facility]     Patient Name: Donya Capellan  : 1950  MRN: [id number]  Today's Date: 2017  Onset of Illness/Injury or Date of Surgery Date: 17     Referring Physician: Dr. Jasso      Admit Date: 2017  Pt completed 3 trials of regular solids and 5 trials of thin liquid w/o any overt s/s of aspiration. ST recommends pt continue regular diet with thin liquids. ST is signing off at this time as ST services are no longer warranted. RN to monitor for increased congestion. MD to re-consult if an issue arises which requires ST services.  Matthew Noland MS, CFY-SLP 2017 2:07 PM    Visit Dx:    ICD-10-CM ICD-9-CM   1. Cerebrovascular accident (CVA) due to thrombosis of left middle cerebral artery I63.312 434.01   2. Dysphagia, unspecified type R13.10 787.20   3. Decreased activities of daily living (ADL) Z78.9 V49.89   4. 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 Jasso  -CS        Pertinent History Of Current Problem CT of head w/o contrast 17- No acute findings. CXR 17- Lungs clear.  -CS        Current Diet Limitations NPO  -CS         Precautions/Limitations, Vision WFL with corrective lenses  -CS        Precautions/Limitations, Hearing WFL  -CS        Prior Level of Function- Communication functional in all spheres  -CS        Prior Level of Function- Swallowing no diet consistency restrictions  -CS        Plans/Goals Discussed With patient  -CS        Barriers to Rehab none identified  -CS        Clinical Impression    Patient's Goals For Discharge patient did not state  -CS        SLP Swallowing Diagnosis mild dysphagia   WFL  -CS        Rehab Potential/Prognosis, Swallowing good, to achieve stated therapy goals  -CS        Criteria for Skilled Therapeutic Interventions Met skilled criteria for dysphagia intervention met  -CS        FCM, Swallowing 6-->Level 6  -CS        Therapy Frequency PRN  -CS        Predicted Duration Therapy Interv (days) until discharge  -CS        Expected Duration Therapy Session (min) 15-30 minutes  -CS        SLP Diet Recommendation regular textures;thin liquids  -CS        Recommended Feeding/Eating Techniques alternate between small bites and sips of food/liquid;maintain upright posture during/after eating for 30 mins  -CS        SLP Rec. for Method of Medication Administration meds whole with thin liquid  -CS        Monitor For Signs Of Aspiration cough;gurgly voice;throat clearing  -CS        Anticipated Discharge Disposition home with assist  -CS        Pain Assessment    Pain Assessment No/denies pain  -CS        Oral Motor Structure and Function    Dentition Assessment present and adequate  -CS        Secretion Management WNL/WFL  -CS        Mucosal Quality moist, healthy  -CS        Velar Elevation WFL (within functional limits)  -CS        Volitional Swallow mild to moderate difficulties initiating volitional swallow  -CS        Volitional Cough no difficulties initiating volitional cough  -CS        Oral Musculature General Assessment oral labial impairment  -CS        Oral Labial Strength and Mobility  right labial droop;other (see comments)   Slight right sided weakness  -CS        General Feeding/Swallowing Observations    Current Feeding Method NPO  -CS        Respiratory Support Currently in Use nasal cannula in use  -CS        Observations of Self Feeding Skills appropriate self feeding skills observed  -CS        Observations of Posture During Feeding Upright in bed  -CS        Clinical Swallow Exam    Mode of Presentation fed by clinician;self fed;spoon;straw  -CS        Oral Phase Results impaired oral phase, signs of dysfunction present  -CS        Pharyngeal Phase Results no signs/symptoms of pharyngeal impairment  -CS        Summary of Clinical Exam Pt was noted to have a pre-existing cough prior to PO trials. The pt stated that the was occuring due to her asthma. The pt complleted a full range of consistencies with a delayed cough per trial of pureed and nectar thick consistencies. 2x multiple swallow with regular solid consistencies. ST feels that the 2x delayed coughs were not related to aspiration as the pt did not have any vocal changes or any other overt s/s with other PO trials.  -CS        Swallow Recommendations    Eating Assistance no assistance needed with self eating  -CS        Oral Care oral care with toothbrush and dentifrice BID and PRN  -CS        Modified Eating Strategies upright positioning 90 degrees  -CS        Other Recommendations regular;thin;meds whole  -CS        Recommended Diet regular textures;thin liquids  -CS          User Key  (r) = Recorded By, (t) = Taken By, (c) = Cosigned By    Initials Name Effective Dates    CS Matthew Noland MS, CFY-SLP 08/02/16 -         EDUCATION  The patient has been educated in the following areas:   Dysphagia (Swallowing Impairment).    SLP Recommendation and Plan  SLP Swallowing Diagnosis: mild dysphagia (WFL)  SLP Diet Recommendation: regular textures, thin liquids  Recommended Feeding/Eating Techniques: alternate between small bites and  sips of food/liquid, maintain upright posture during/after eating for 30 mins  SLP Rec. for Method of Medication Administration: meds whole with thin liquid  Monitor For Signs Of Aspiration: cough, gurgly voice, throat clearing     Criteria for Skilled Therapeutic Interventions Met: skilled criteria for dysphagia intervention met  Anticipated Discharge Disposition: home  Rehab Potential/Prognosis, Swallowing: good, to achieve stated therapy goals  Therapy Frequency: PRN             Plan of Care Review  Plan Of Care Reviewed With: patient  Progress: improving  Outcome Summary/Follow up Plan: Pt to continue regular diet with thin liquids. ST signing off at this time as services are no longer warranted. MD to re-consult if an issue arises which requires ST services.          IP SLP Goals       04/11/17 1402 04/10/17 0912       Safely Consume Diet    Safely Consume Diet- SLP, Date Established  04/10/17  -CS     Safely Consume Diet- SLP, Time to Achieve  by discharge  -CS     Safely Consume Diet- SLP, Additional Goal  Pt will tolerate recommended diet w/o any overt s/s of aspiration.  -CS     Safely Consume Diet- SLP, Date Goal Reviewed 04/11/17  -CS      Safely Consume Diet- SLP, Outcome goal met  -CS goal ongoing  -CS       User Key  (r) = Recorded By, (t) = Taken By, (c) = Cosigned By    Initials Name Provider Type    CS Matthew Noland MS, CFY-SLP Speech and Language Pathologist             SLP Outcome Measures (last 72 hours)      SLP Outcome Measures       04/10/17 0900          SLP Outcome Measures    Outcome Measure Used? Adult NOMS  -CS      OTHER    SLP Diagnoses Dysphagia  -CS      FCM Scores    FCM Chosen Swallowing  -CS      Swallowing FCM Score 6  -CS        User Key  (r) = Recorded By, (t) = Taken By, (c) = Cosigned By    Initials Name Effective Dates    SOFIA Noland MS, CFY-SLP 08/02/16 -            Time Calculation:         Time Calculation- SLP       04/11/17 1404          Time Calculation- SLP    SLP  Start Time 1048  -      SLP Stop Time 1105  -CS      SLP Time Calculation (min) 17 min  -CS      SLP Received On 04/11/17  -        User Key  (r) = Recorded By, (t) = Taken By, (c) = Cosigned By    Initials Name Provider Type     Matthew Noland MS, CFY-SLP Speech and Language Pathologist          Therapy Charges for Today     Code Description Service Date Service Provider Modifiers Qty    23126961234 HC ST SWALLOWING CURRENT STATUS 4/10/2017 Matthew Noland MS, CFY-SLP GN, CI 1    65174015365 HC ST SWALLOWING PROJECTED 4/10/2017 Matthew Noland MS, CFY-SLP GN, CI 1    17666563165 HC ST EVAL ORAL PHARYNG SWALLOW 4 4/10/2017 Matthew Noland MS, CFY-SLP GN 1    [id number] HC ST TREATMENT SWALLOW 1 4/11/2017 Matthew Noland MS, CFY-SLP GN, KX 1          SLP G-Codes  SLP NOMS Used?: Yes  Functional Limitations: Swallowing  Swallow Current Status (): At least 1 percent but less than 20 percent impaired, limited or restricted  Swallow Goal Status (): At least 1 percent but less than 20 percent impaired, limited or restricted    SLP Discharge Summary  Anticipated Discharge Disposition: home  Reason for Discharge: All goals achieved  Outcomes Achieved: Able to achieve all goals within established timeline  Discharge Destination: other (comment) (Pt remains in acute care.)    Matthew Noland MS, RYLEE-SLP  4/11/2017

## 2017-04-11 NOTE — PLAN OF CARE
Problem: Patient Care Overview (Adult)  Goal: Plan of Care Review  Outcome: Ongoing (interventions implemented as appropriate)    04/11/17 7282   Outcome Evaluation   Outcome Summary/Follow up Plan Pt still has numbness, tingling, and some weakness to the right arm and hand. Ok to transfer to the floor.    Patient Care Overview   Progress improving   Coping/Psychosocial Response Interventions   Plan Of Care Reviewed With patient;son       Goal: Adult Individualization and Mutuality  Outcome: Ongoing (interventions implemented as appropriate)    Problem: Stroke (Ischemic) (Adult)  Goal: Signs and Symptoms of Listed Potential Problems Will be Absent or Manageable (Stroke)  Outcome: Ongoing (interventions implemented as appropriate)    Problem: Fall Risk (Adult)  Goal: Identify Related Risk Factors and Signs and Symptoms  Outcome: Ongoing (interventions implemented as appropriate)

## 2017-04-11 NOTE — PROGRESS NOTES
Neurology Progress Note      Subjective     Subjective:  Patient reports still has abnormal sensation of the right body and mild weakness. No new neurologic issues    Medications:  Current Facility-Administered Medications   Medication Dose Route Frequency Provider Last Rate Last Dose   • acetaminophen (TYLENOL) tablet 650 mg  650 mg Oral Q6H PRN Kera Singletary MD   650 mg at 04/1950   • aspirin tablet 325 mg  325 mg Oral Daily Kera Singletary MD   325 mg at 04/11/17 0959    Or   • aspirin suppository 300 mg  300 mg Rectal Daily Kera Singletary MD       • atorvastatin (LIPITOR) tablet 80 mg  80 mg Oral Nightly Kera Singletary MD   80 mg at 04/10/17 2143   • hydrALAZINE (APRESOLINE) injection 20 mg  20 mg Intravenous Q6H PRN Kera Singletary MD       • lisinopril (PRINIVIL,ZESTRIL) tablet 10 mg  10 mg Oral Q24H Kera Singletary MD   10 mg at 04/11/17 0959   • LORazepam (ATIVAN) injection 0.5 mg  0.5 mg Intravenous Q2H PRN Kera Singletary MD   0.5 mg at 04/10/17 1949   • ondansetron (ZOFRAN) injection 4 mg  4 mg Intravenous Q3H PRN Kera Singletary MD   4 mg at 04/10/17 1030   • sodium chloride 0.9 % flush 1-10 mL  1-10 mL Intravenous PRN Kera Singletary MD           Review of Systems:   -A 14 point review of systems is completed and is positive for cough and wheezing.      Objective      Vital Signs  Temp:  [97 °F (36.1 °C)-97.5 °F (36.4 °C)] 97.2 °F (36.2 °C)  Heart Rate:  [] 125  Resp:  [12-20] 16  BP: ()/() 107/95    Physical Exam:    CVS:  Regular rate and rhythm.    Chest: Lung bases are clear, a few large airway wheezes  Abdomen: Soft and nontender  Extremities:  No signs of peripheral edema    Neurologic Exam:  Alert oriented and fluent  Face is symmetric, no dysarthria  Right upper extremity 5 -/5  Right lower extremity 5 -/5  Left upper and lower extremities 5/5     Results Review:    Cholesterol 169, triglycerides 140, LDL 81    Carotid dopplers  1. There  is less than 50% stenosis of the right internal carotid artery.  2. There is less than 50% stenosis of the left internal carotid artery.  3. Antegrade flow is demonstrated in bilateral vertebral arteries    Brain MRI  Acute lacunar infarct in the LEFT basal ganglia.    Routine Echo  No embolic source    Assessment/Plan     Impression:  Acute left hemispheric stroke status post TPA   Exam stable  Risk factors for stroke include history of dyslipidemia and hypertension  HTN better on lisinopril    Plan:  Transfer to floor  Aspirin 325 mg daily  Lipitor 80 mg daily  Lisinopril 10 mg daily  PT/OT/speech therapy  Resume asthma medication  Discharge planning: likely home tomorrow with outpatient therapy      Kera Singletary MD  04/11/17  10:25 AM

## 2017-04-11 NOTE — PROGRESS NOTES
Acute Care - Occupational Therapy Initial Evaluation  [facility]     Patient Name: Donya Capellan  : 1950  MRN: [id number]  Today's Date: 2017  Onset of Illness/Injury or Date of Surgery Date: 17  Date of Referral to OT: 17  Referring Physician: Dr. Singletary    Admit Date: 2017       ICD-10-CM ICD-9-CM   1. Cerebrovascular accident (CVA) due to thrombosis of left middle cerebral artery I63.312 434.01   2. Dysphagia, unspecified type R13.10 787.20   3. 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     Past Medical History:   Diagnosis Date   • Asthma    • Hyperlipidemia    • Hypertension    • Stroke 2017     Past Surgical History:   Procedure Laterality Date   • GALLBLADDER SURGERY     • TUBAL ABDOMINAL LIGATION            OT ASSESSMENT FLOWSHEET (last 72 hours)      OT Evaluation       17 0822 17 0810 17 0800 17 0400 17 0000    Rehab Evaluation    Document Type evaluation   see MAR  -MM (P)  evaluation   See MAR  -TS       Subjective Information agree to therapy;complains of;weakness;fatigue;numbness;dyspnea   numbness/tingling entire R side from face to feet  -MM (P)  agree to therapy;complains of;numbness;dyspnea;weakness;fatigue   n/t R side, SOB from chronic asthma per pt  -TS       General Information    Patient Profile Review yes  -MM (P)  yes  -TS       Onset of Illness/Injury or Date of Surgery Date 17  -MM (P)  17  -TS       Referring Physician Dr. Singletary  -MM (P)  Dr. Singletary   stroke  -TS       General Observations pt in apurva heredia present, no apparent distress, telemetry, blood pressure cuff, continuous pulse ox  -MM (P)  NASRIN heredia in room, telemetry  -TS       Pertinent History Of Current Problem Pt admitted with  c/o R sided numbness/tingling; TPA administered in ED, stopped at 1608 on 4/9 due to possible bleed; 4/10 MRI acute lacunar infarct in L basal ganglia  -MM (P)  Pt admitted with c/o R sided numbness/tingling; TPA administered in ED, stopped at 1608 on 4/9 due to possible bleed; 4/10 MRI acute lacunar infarct in L basal ganglia  -TS       Precautions/Limitations fall precautions  -MM (P)  fall precautions  -TS       Prior Level of Function independent:;all household mobility;community mobility;gait;driving;bathing;dressing;ADL's  -MM (P)  independent:;all household mobility;community mobility;gait;driving;bathing;dressing;ADL's  -TS       Equipment Currently Used at Home none  -MM (P)  none  -TS       Plans/Goals Discussed With patient;agreed upon  -MM (P)  patient;agreed upon  -TS       Risks Reviewed patient:;nausea/vomiting;LOB;dizziness;increased discomfort;change in vital signs  -MM (P)  patient:;LOB;nausea/vomiting;dizziness;change in vital signs  -TS       Benefits Reviewed patient:;increase independence;improve function;increase balance;increase strength;decrease pain;decrease risk of DVT;improve skin integrity;increase knowledge  -MM (P)  patient:;improve function;increase independence;increase strength;increase balance  -TS       Barriers to Rehab medically complex;physical barrier  -MM (P)  medically complex  -TS       Living Environment    Lives With significant other  -MM (P)  significant other  -TS       Living Arrangements apartment  -MM (P)  apartment  -TS       Home Accessibility tub/shower is not walk in  -MM (P)  tub/shower is not walk in;bed and bath on same level  -TS       Stair Railings at Home none  -MM (P)  none  -TS       Transportation Available ambulance;family or friend will provide  -MM        Living Environment Comment Pt states she lives with boyfriend most of the time, but pt states both apt's don't have stairs and have tub/shower combo  -MM        Clinical Impression    Date of  Referral to OT 04/09/17  -MM        OT Diagnosis Decreased ADL  -MM        Impairments Found (describe specific impairments) aerobic capacity/endurance;ergonomics and body mechanics;gait, locomotion, and balance;motor function;muscle performance  -MM        Patient/Family Goals Statement return home, possibly [facility] for additional therapy first  -MM        Criteria for Skilled Therapeutic Interventions Met yes;treatment indicated  -MM        Rehab Potential good, to achieve stated therapy goals  -MM        Therapy Frequency 3-5 times/wk  -MM        Predicted Duration of Therapy Intervention (days/wks) until d/c  -MM        Anticipated Equipment Needs At Discharge tub bench  -MM        Anticipated Discharge Disposition home with assist;home with home health;inpatient rehabilitation facility   pending pt progress  -MM        Vital Signs    Pre Systolic BP Rehab 127  -MM (P)  127  -TS       Pre Treatment Diastolic BP 77  -MM (P)  77  -TS       Post Systolic BP Rehab  (P)  117  -TS       Post Treatment Diastolic BP  (P)  97  -TS       Pretreatment Heart Rate (beats/min) 109  -MM (P)  109  -TS       Intratreatment Heart Rate (beats/min)  (P)  124  -TS       Posttreatment Heart Rate (beats/min)  (P)  109  -TS       Pre SpO2 (%) 97  -MM (P)  97  -TS       O2 Delivery Pre Treatment room air  -MM (P)  room air  -TS       Post SpO2 (%)  (P)  97  -TS       O2 Delivery Post Treatment  (P)  room air  -TS       Pre Patient Position --   fowlers  -MM (P)  Supine  -TS       Intra Patient Position Sitting  -MM (P)  Standing  -TS       Post Patient Position Sitting  -MM (P)  Sitting  -TS       Pain Assessment    Pain Assessment No/denies pain  -MM (P)  No/denies pain  -TS       Vision Assessment/Intervention    Visual Impairment WFL with corrective lenses  -MM (P)  WFL with corrective lenses   per pt  -TS       Cognitive Assessment/Intervention    Current Cognitive/Communication Assessment functional  -MM (P)  functional  -TS        Orientation Status oriented x 4  -MM (P)  oriented x 4  -TS       Follows Commands/Answers Questions 100% of the time;able to follow multi-step instructions  -MM (P)  100% of the time;able to follow multi-step instructions;needs cueing  -TS       Personal Safety decreased awareness, need for assist;decreased awareness, need for safety  -MM (P)  decreased awareness, need for assist;decreased awareness, need for safety  -TS       Personal Safety Interventions fall prevention program maintained;gait belt;muscle strengthening facilitated;nonskid shoes/slippers when out of bed;supervised activity  -MM (P)  fall prevention program maintained;gait belt;nonskid shoes/slippers when out of bed;supervised activity  -TS       ROM (Range of Motion)    General ROM Detail BUE AROM WFL, R arm moves slower than L  -MM (P)  LLE WFL; RLE AROM limited 25% due to weakness, full PROM  -TS       MMT (Manual Muscle Testing)    General MMT Assessment Detail R hand 3+/5, L hand 4-/5, B tricep/bicep 4-/5  -MM (P)  LLE 4+/5; RLE 4-/5  -TS       Muscle Tone Assessment    Muscle Tone Assessment   Right-Side Extremities  -BM Right-Side Extremities  -SS Right-Side Extremities  -SS    Right-Side Extremities Muscle Tone Assessment   rigidity  -BM rigidity  -SS rigidity  -SS    Bed Mobility, Assessment/Treatment    Bed Mobility, Assistive Device bed rails;head of bed elevated  -MM        Bed Mob, Supine to Sit, [place] supervision required;verbal cues required  -MM (P)  verbal cues required;supervision required  -TS       Bed Mob, Sit to Supine, [place] not tested   up in chair  -MM        Bed Mobility, Safety Issues decreased use of arms for pushing/pulling;decreased use of legs for bridging/pushing  -MM (P)  decreased use of arms for pushing/pulling;decreased use of legs for bridging/pushing  -TS       Bed Mobility, Impairments strength decreased;impaired balance  -MM (P)  sensation decreased;strength decreased;coordination  impaired;motor control impaired  -TS       Transfer Assessment/Treatment    Transfers, Sit-Stand [place] contact guard assist;2 person assist required;verbal cues required  -MM (P)  verbal cues required;contact guard assist;2 person assist required  -TS       Transfers, Stand-Sit [place] contact guard assist;verbal cues required;2 person assist required  -MM (P)  verbal cues required;contact guard assist;2 person assist required  -TS       Transfers, Sit-Stand-Sit, Assist Device --   HHA x2  -MM        Transfer, Safety Issues balance decreased during turns;weight-shifting ability decreased;step length decreased  -MM (P)  balance decreased during turns;sequencing ability decreased  -TS       Transfer, Impairments strength decreased;impaired balance  -MM (P)  sensation decreased;strength decreased;impaired balance;coordination impaired;motor control impaired  -TS       Functional Mobility    Functional Mobility- Ind. Level contact guard assist;minimum assist (75% patient effort);2 person assist required  -MM        Functional Mobility- Device --   HHA x2  -MM        Functional Mobility- Comment Pt ambulated from bed, around the entire ICU and back to chair. Pt kept R knee extended while ambulating  -MM        Lower Body Dressing Assessment/Training    LB Dressing Assess/Train, Clothing Type slipper socks   adjusting  -MM        LB Dressing Assess/Train, Position sink side;edge of bed;long sitting  -MM        LB Dressing Assess/Train, [place] contact guard assist;supervision required;verbal cues required  -MM        LB Dressing Assess/Train, Impairments strength decreased;impaired balance  -MM        LB Dressing Assess/Train, Comment Pt adjusted socks in long sitting and sitting EOB. Pt does not wear socks at baseline  -MM        Grooming Assessment/Training    Grooming Assess/Train, Position long sitting  -MM        Grooming Assess/Train, Indepen Level set up required;supervision required;verbal cues  required  -MM        Grooming Assess/Train, Impairments strength decreased;impaired balance  -MM        Grooming Assess/Train, Comment pt brushed teeth and combed hair with set up assist and min spillage while brushing teeth.  -MM        Motor Skills/Interventions    Additional Documentation Fine Motor Coordination Training (Group);Gross Motor Coordination Training (Group)  -MM (P)  Balance Skills Training (Group);Gross Motor Coordination Training (Group)  -TS       Balance Skills Training    Sitting-Level of Assistance  (P)  Close supervision  -TS       Sitting-Balance Support  (P)  Right upper extremity supported;Left upper extremity supported;Feet supported  -TS       Standing-Level of Assistance  (P)  Contact guard;Minimum assistance  -TS       Static Standing Balance Support  (P)  Right upper extremity supported;Left upper extremity supported  -TS       Gait Balance-Level of Assistance  (P)  Contact guard;Minimum assistance  -TS       Gait Balance Support  (P)  Right upper extremity supported;Left upper extremity supported  -TS       Gross Motor Coordination Training    Gross Motor Skill, Impairments Detail Finger to nose: LUE intact, RUE impaired d/t slow movements   -MM (P)  Heel to shin intact bilaterally  -TS       Fine Motor Coordination Training    Opposition Right:;Left:;impaired  -MM        Sensory Assessment/Intervention    Light Touch LUE;RUE  -MM (P)  LLE;RLE  -TS Head/Neck  -BM Head/Neck  -SS Head/Neck  -SS    LUE Light Touch WNL  -MM        RUE Light Touch mild impairment  -MM  mild impairment  -BM mild impairment  -SS mild impairment  -SS    LLE Light Touch  (P)  WNL  -TS       RLE Light Touch  (P)  mild impairment  -TS mild impairment  -BM mild impairment  -SS mild impairment  -SS    Head/Neck Light Touch   mild impairment  -BM mild impairment  -SS mild impairment  -SS    Sharp/Dull Discrimination   RUE;RLE  -BM RUE;RLE  -SS RUE;RLE  -SS    RUE Sharp/Dull Discrimination WNL  -MM  mild impairment   -BM mild impairment  -SS mild impairment  -SS    RLE Sharp/Dull Discrimination mild impairment  -MM  mild impairment  -BM mild impairment  -SS mild impairment  -SS    Deep Pressure  (P)  LLE;RLE  -TS       General Therapy Interventions    Planned Therapy Interventions ADL retraining;activity intolerance;adaptive equipment training;balance training;bed mobility training;energy conservation;fine motor coordination training;home exercise program;motor coordination training;strengthening;transfer training  -MM        Positioning and Restraints    Pre-Treatment Position in bed  -MM        Post Treatment Position chair  -MM        In Chair notified nsg;reclined;call light within reach;encouraged to call for assist;legs elevated   within staff view  -MM          04/10/17 2000 04/10/17 1700 04/10/17 1600 04/10/17 1500 04/10/17 1400    Muscle Tone Assessment    Muscle Tone Assessment Right-Side Extremities  -SS Right-Side Extremities  -TC Right-Side Extremities  -TC Right-Side Extremities  -TC Right-Side Extremities  -TC    Right-Side Extremities Muscle Tone Assessment rigidity  -SS mildly increased tone  -TC rigidity  -TC mildly increased tone  -TC mildly increased tone  -TC    Sensory Assessment/Intervention    Light Touch Head/Neck  -SS Head/Neck  -TC Head/Neck  -TC Head/Neck  -TC Head/Neck  -TC    RUE Light Touch mild impairment  -SS mild impairment  -TC mild impairment  -TC mild impairment  -TC mild impairment  -TC    RLE Light Touch mild impairment  -SS mild impairment  -TC mild impairment  -TC mild impairment  -TC mild impairment  -TC    Head/Neck Light Touch mild impairment  -SS mild impairment  -TC mild impairment  -TC mild impairment  -TC mild impairment  -TC    Sharp/Dull Discrimination RUE;RLE  -SS RUE;RLE  -TC RUE;RLE  -TC RUE;RLE  -TC RUE;RLE  -TC    RUE Sharp/Dull Discrimination mild impairment  -SS mild impairment  -TC mild impairment  -TC mild impairment  -TC mild impairment  -TC    RLE Sharp/Dull  Discrimination mild impairment  -SS mild impairment  -TC mild impairment  -TC mild impairment  -TC mild impairment  -TC      04/10/17 1300 04/10/17 1200 04/10/17 1100 04/10/17 1000 04/10/17 0925    General Information    Equipment Currently Used at Home     none  -DH    Living Environment    Lives With     significant other  -    Living Arrangements     apartment;house  -    Transportation Available     car;family or friend will provide  -DH    Muscle Tone Assessment    Muscle Tone Assessment Right-Side Extremities  -TC Right-Side Extremities  -TC Right-Side Extremities  -TC Right-Side Extremities  -TC     Right-Side Extremities Muscle Tone Assessment mildly increased tone  -TC rigidity  -TC mildly increased tone  -TC mildly increased tone  -TC     Sensory Assessment/Intervention    Light Touch Head/Neck  -TC  Head/Neck  -TC Head/Neck  -TC     RUE Light Touch mild impairment  -TC  mild impairment  -TC mild impairment  -TC     RLE Light Touch mild impairment  -TC  mild impairment  -TC mild impairment  -TC     Head/Neck Light Touch mild impairment  -TC  mild impairment  -TC mild impairment  -TC     Sharp/Dull Discrimination RUE;RLE  -TC  RUE;RLE  -TC RUE;RLE  -TC     RUE Sharp/Dull Discrimination mild impairment  -TC  mild impairment  -TC mild impairment  -TC     RLE Sharp/Dull Discrimination mild impairment  -TC  mild impairment  -TC mild impairment  -TC       04/10/17 0900 04/10/17 0839 04/10/17 0815 04/10/17 0800 04/10/17 0730    Rehab Evaluation    Document Type   evaluation  -CS      Subjective Information   agree to therapy;complains of;weakness  -CS      Evaluation Not Performed  other (see comments)   Pt received tPA, on hold until 1608 on 4/10.  -NATALY       Pain Assessment    Pain Assessment   No/denies pain  -CS      Muscle Tone Assessment    Muscle Tone Assessment Right-Side Extremities  -TC   Right-Side Extremities  -TC Right-Side Extremities  -TC    Right-Side Extremities Muscle Tone Assessment mildly  increased tone  -TC   mildly increased tone  -TC rigidity  -TC    Sensory Assessment/Intervention    Light Touch Head/Neck  -TC   Head/Neck  -TC     RUE Light Touch mild impairment  -TC   mild impairment  -TC     RLE Light Touch mild impairment  -TC   mild impairment  -TC     Head/Neck Light Touch mild impairment  -TC   mild impairment  -TC     Sharp/Dull Discrimination RUE;RLE  -TC   RUE;RLE  -TC     RUE Sharp/Dull Discrimination mild impairment  -TC   mild impairment  -TC     RLE Sharp/Dull Discrimination mild impairment  -TC   mild impairment  -TC       04/10/17 0700 04/10/17 0600 04/10/17 0500 04/10/17 0400 04/10/17 0300    Muscle Tone Assessment    Muscle Tone Assessment Right-Side Extremities  -MC Right-Side Extremities  -MC Right-Side Extremities  -MC Right-Side Extremities  -MC Right-Side Extremities  -MC    Right-Side Extremities Muscle Tone Assessment rigidity  -MC rigidity  -MC rigidity  -MC rigidity  -MC rigidity  -MC    Sensory Assessment/Intervention    Light Touch Head/Neck  -MC Head/Neck  -MC Head/Neck  -MC Head/Neck  -MC Head/Neck  -MC    RUE Light Touch mild impairment  -MC mild impairment  -MC mild impairment  -MC mild impairment  -MC mild impairment  -MC    RLE Light Touch mild impairment  -MC mild impairment  -MC mild impairment  -MC mild impairment  -MC mild impairment  -MC    Head/Neck Light Touch mild impairment  -MC mild impairment  -MC mild impairment  -MC mild impairment  -MC mild impairment  -MC    Sharp/Dull Discrimination RUE;RLE  -MC RUE;RLE  -MC RUE;RLE  -MC RUE;RLE  -MC RUE;RLE  -MC    RUE Sharp/Dull Discrimination mild impairment  -MC mild impairment  -MC mild impairment  -MC mild impairment  -MC mild impairment  -MC    RLE Sharp/Dull Discrimination mild impairment  -MC mild impairment  -MC mild impairment  -MC mild impairment  -MC mild impairment  -MC      04/10/17 0200 04/10/17 0100 04/10/17 0000 04/09/17 2330 04/09/17 2300    Muscle Tone Assessment    Muscle Tone Assessment  Right-Side Extremities  -MC Right-Side Extremities  -SS Right-Side Extremities  -SS Right-Side Extremities  -SS Right-Side Extremities  -SS    Right-Side Extremities Muscle Tone Assessment rigidity  -MC rigidity  -SS rigidity  -SS rigidity  -SS rigidity  -SS    Sensory Assessment/Intervention    Light Touch Head/Neck  -MC Head/Neck;RLE;RUE  -SS Head/Neck;RLE;RUE  -SS Head/Neck;RLE;RUE  -SS Head/Neck;RLE;RUE  -SS    RUE Light Touch mild impairment  -MC mild impairment  -SS mild impairment  -SS mild impairment  -SS mild impairment  -SS    RLE Light Touch mild impairment  -MC mild impairment  -SS mild impairment  -SS mild impairment  -SS mild impairment  -SS    Head/Neck Light Touch mild impairment  -MC moderate impairment  -SS moderate impairment  -SS moderate impairment  -SS moderate impairment  -SS    Sharp/Dull Discrimination RUE;RLE  -MC RUE;RLE  -SS RLE;RUE  -SS RUE;RLE  -SS RUE;RLE  -SS    RUE Sharp/Dull Discrimination mild impairment  -MC mild impairment  -SS mild impairment  -SS mild impairment  -SS mild impairment  -SS    RLE Sharp/Dull Discrimination mild impairment  -MC mild impairment  -SS mild impairment  -SS mild impairment  -SS mild impairment  -SS      04/09/17 2230 04/09/17 2200 04/09/17 2130 04/09/17 2120 04/09/17 2100    Muscle Tone Assessment    Muscle Tone Assessment Right-Side Extremities  -SS Right-Side Extremities  -SS Right-Side Extremities  -SS Right-Side Extremities  -SS     Right-Side Extremities Muscle Tone Assessment rigidity  -SS rigidity  -SS rigidity  -SS rigidity  -SS     Sensory Assessment/Intervention    Light Touch Head/Neck;RLE;RUE  -SS Head/Neck;RLE;RUE  -SS Head/Neck;RLE;RUE  -SS Head/Neck;RLE;RUE  -SS Head/Neck;RLE;RUE  -SS    RUE Light Touch mild impairment  -SS mild impairment  -SS moderate impairment  -SS moderate impairment  -SS mild impairment  -SS    RLE Light Touch mild impairment  -SS mild impairment  -SS moderate impairment  -SS moderate impairment  -SS mild impairment   -SS    Head/Neck Light Touch moderate impairment  -SS moderate impairment  -SS moderate impairment  -SS moderate impairment  -SS moderate impairment  -SS    Sharp/Dull Discrimination RUE;RLE  -SS RUE;RLE  -SS RUE;RLE  -SS RUE;RLE  -SS RLE;RUE  -SS    RUE Sharp/Dull Discrimination mild impairment  -SS mild impairment  -SS moderate impairment  -SS moderate impairment  -SS mild impairment  -SS    RLE Sharp/Dull Discrimination mild impairment  -SS mild impairment  -SS moderate impairment  -SS moderate impairment  -SS mild impairment  -SS      04/09/17 2030 04/09/17 2000 04/09/17 1930 04/09/17 1900 04/09/17 1830    Sensory Assessment/Intervention    Light Touch Head/Neck;RLE;RUE  -SS Head/Neck;RLE;RUE  -SS Head/Neck;RLE;RUE  -SS Head/Neck;RLE;RUE  -TCA Head/Neck;RLE;RUE  -TCA    RUE Light Touch mild impairment  -SS mild impairment  -SS mild impairment  -SS mild impairment  -TCA mild impairment  -TCA    RLE Light Touch mild impairment  -SS mild impairment  -SS mild impairment  -SS mild impairment  -TCA mild impairment  -TCA    Head/Neck Light Touch moderate impairment  -SS moderate impairment  -SS moderate impairment  -SS moderate impairment  -TCA moderate impairment  -TCA    Sharp/Dull Discrimination RLE;RUE  -SS RLE;RUE  -SS RLE;RUE  -SS      RUE Sharp/Dull Discrimination mild impairment  -SS mild impairment  -SS mild impairment  -SS      RLE Sharp/Dull Discrimination mild impairment  -SS mild impairment  -SS mild impairment  -SS        04/09/17 1821 04/09/17 1815 04/09/17 1800 04/09/17 1745       General Information    Equipment Currently Used at Home  none  -TCA       Living Environment    Lives With significant other  -TCA        Living Arrangements apartment;house  -TCA        Home Accessibility no concerns  -TCA        Stair Railings at Home none  -TCA        Type of Financial/Environmental Concern none  -TCA        Transportation Available car;family or friend will provide  -TCA        Living Environment Comment  alternates living space with boyfriend  -TCA        Functional Level Prior    Ambulation  0-->independent  -TCA       Transferring  0-->independent  -TCA       Toileting  0-->independent  -TCA       Bathing  0-->independent  -TCA       Dressing  0-->independent  -TCA       Eating  0-->independent  -TCA       Communication  0-->understands/communicates without difficulty  -TCA       Swallowing  0-->swallows foods/liquids without difficulty  -TCA       Prior Functional Level Comment independent  -TCA        Sensory Assessment/Intervention    Light Touch  Head/Neck;RLE;RUE  -TCA Head/Neck;RLE;RUE  -TCA Head/Neck;RLE;RUE  -TCA     RUE Light Touch  mild impairment  -TCA mild impairment  -TCA mild impairment  -TCA     RLE Light Touch  mild impairment  -TCA mild impairment  -TCA mild impairment  -TCA     Head/Neck Light Touch  moderate impairment  -TCA moderate impairment  -TCA moderate impairment  -TCA       User Key  (r) = Recorded By, (t) = Taken By, (c) = Cosigned By    Initials Name Effective Dates    NATALY Dent, PT DPT 08/02/16 -     MC Elisa Delacruz, RN 08/02/16 -     TCA Ann Marie Powell, RN 08/02/16 -     TC José Miguel Brandt, RN 08/02/16 -     SS Aysha Hawk, RN 08/02/16 -      Sharri Sanon, [facility] 09/15/16 -     CS Matthew Noland, MS, CFY-SLP 08/02/16 -     MM Dre De Oliveira, OTR/L 10/21/16 -     BM Jessica Segovia, RN, BSN 01/09/17 -     TS Karson Benton, PT Student 03/09/17 -            Occupational Therapy Education     Title: PT OT SLP Therapies (Active)     Topic: Occupational Therapy (Active)     Point: ADL training (Done)    Description: Instruct learner(s) on proper safety adaptation and remediation techniques during self care or transfers.   Instruct in proper use of assistive devices.    Learning Progress Summary    Learner Readiness Method Response Comment Documented by Status   Patient Acceptance E VU Pt educated on OT role, benefits and POC MM 04/11/17 0921 Done                      User  Key     Initials Effective Dates Name Provider Type Discipline     10/21/16 -  Dre De Oliveira, OTR/L Occupational Therapist OT                  OT Recommendation and Plan  Anticipated Equipment Needs At Discharge: tub bench  Anticipated Discharge Disposition: home with assist, home with home health, inpatient rehabilitation facility (pending pt progress)  Planned Therapy Interventions: ADL retraining, activity intolerance, adaptive equipment training, balance training, bed mobility training, energy conservation, fine motor coordination training, home exercise program, motor coordination training, strengthening, transfer training  Therapy Frequency: 3-5 times/wk  Plan of Care Review  Plan Of Care Reviewed With: patient  Progress: progress toward functional goals as expected  Outcome Summary/Follow up Plan: OT initial eval completed. Pt supervision for bed mobility with HOB elevated and bed rails. Pt CGA x2 for t/f with HHA x2. Pt min-CGA x2 with HHAx2 for functional mobility. Pt set up assist for grooming in fowlers/long sitting with minimal spillage while brushing teeth. Pt cga/supervision to adjust slipper socks. Skilled OT recommeneded to address adls, t/f, functional mobility, bed mobility, safety, coordination, strength and sensation. Recommended d/c home with assist/HH v. inpatient rehab, pending pt progress.          OT Goals       04/11/17 0922          Bed Mobility OT LTG    Bed Mobility OT LTG, Date Established 04/11/17  -MM      Bed Mobility OT LTG, Time to Achieve by discharge  -MM      Bed Mobility OT LTG, Activity Type all bed mobility  -MM      Bed Mobility OT LTG, [place] Level independent  -MM      Strength OT LTG    Strength Goal OT LTG, Date Established 04/11/17  -MM      Strength Goal OT LTG, Time to Achieve by discharge  -MM      Strength Goal OT LTG, Functional Goal Pt will independently maintin BUE AROM HEP to increase strength to 4+/5 and increase independence in ADLs.  -MM       Bathing OT LTG    Bathing Goal OT LTG, Date Established 04/11/17  -MM      Bathing Goal OT LTG, Time to Achieve by discharge  -MM      Bathing Goal OT LTG, Activity Type upper body bathing;lower body bathing  -MM      Bathing Goal OT LTG, [place] Level conditional independence  -MM      Bathing Goal OT LTG, Assist Device sponge, long handled;grab bars;tub bench with back   AE PRN  -MM      LB Dressing OT LTG    LB Dressing Goal OT LTG, Date Established 04/11/17  -MM      LB Dressing Goal OT LTG, Time to Achieve by discharge  -MM      LB Dressing Goal OT LTG, [place] Level conditional independence  -MM      LB Dressing Goal OT LTG, Adaptive Equipment reacher;shoe horn, long handled;sock-aid   AE PRN  -MM      Coordination OT LTG    Coordination OT LTG, Date Established 04/11/17  -MM      Coordination OT LTG, Time to Achieve by discharge  -MM      Coordination OT LTG, Activity Type FM written ex program;GM written ex program;FM task;GM task  -MM      Coordination OT LTG, [place] Level independent  -MM        User Key  (r) = Recorded By, (t) = Taken By, (c) = Cosigned By    Initials Name Provider Type    ESPINOZA De Oliveira OTR/L Occupational Therapist                Outcome Measures       04/11/17 0900          How much help from another is currently needed...    Putting on and taking off regular lower body clothing? 3  -MM      Bathing (including washing, rinsing, and drying) 3  -MM      Toileting (which includes using toilet bed pan or urinal) 3  -MM      Putting on and taking off regular upper body clothing 3  -MM      Taking care of personal grooming (such as brushing teeth) 3  -MM      Eating meals 3  -MM      Score 18  -MM      Functional Assessment    Outcome Measure Options AM-PAC 6 Clicks Daily Activity (OT)  -MM        User Key  (r) = Recorded By, (t) = Taken By, (c) = Cosigned By    Initials Name Provider Type    ESPINOZA De Oliveira OTR/L Occupational Therapist          Time Calculation:    OT Start Time: 0822  OT Stop Time: 0910  OT Time Calculation (min): 48 min    Therapy Charges for Today     Code Description Service Date Service Provider Modifiers Qty    [id number]  OT SELFCARE CURRENT 4/11/2017 Dre De Oliveira OTR/L GO, CK 1    [id number]  OT SELFCARE PROJECTED 4/11/2017 Dre De Oliveira OTR/L GO, CI 1    [id number]  OT EVAL LOW COMPLEXITY 3 4/11/2017 Dre De Oliveira OTR/L GO, KX 1          OT G-codes  OT Professional Judgement Used?: Yes  OT Functional Scales Options: AM-PAC 6 Clicks Daily Activity (OT)  Score: 18  Functional Limitation: Self care  Self Care Current Status (): At least 40 percent but less than 60 percent impaired, limited or restricted  Self Care Goal Status (): At least 1 percent but less than 20 percent impaired, limited or restricted    JOSÉ LUIS Waite/DANITA  4/11/2017

## 2017-04-11 NOTE — PROGRESS NOTES
Acute Care - Physical Therapy Initial Evaluation  [facility]     Patient Name: Donya Capellan  : 1950  MRN: [id number]  Today's Date: 2017   Onset of Illness/Injury or Date of Surgery Date: 17  Date of Referral to PT: 17  Referring Physician: Dr. Singletary      Admit Date: 2017     Visit Dx:    ICD-10-CM ICD-9-CM   1. Cerebrovascular accident (CVA) due to thrombosis of left middle cerebral artery I63.312 434.01   2. Dysphagia, unspecified type R13.10 787.20   3. Decreased activities of daily living (ADL) Z78.9 V49.89   4. 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     Past Medical History:   Diagnosis Date   • Asthma    • Hyperlipidemia    • Hypertension    • Stroke 2017     Past Surgical History:   Procedure Laterality Date   • GALLBLADDER SURGERY     • TUBAL ABDOMINAL LIGATION            PT ASSESSMENT (last 72 hours)      PT Evaluation       17 1242 17 0822    Rehab Evaluation    Document Type  evaluation   see MAR  -MM    Subjective Information  agree to therapy;complains of;weakness;fatigue;numbness;dyspnea   numbness/tingling entire R side from face to feet  -MM    General Information    Patient Profile Review  yes  -MM    Onset of Illness/Injury or Date of Surgery Date  17  -MM    Referring Physician  Dr. Singletary  -MM    General Observations  pt in fowlers, nsg present, no apparent distress, telemetry, blood pressure cuff, continuous pulse ox  -MM    Pertinent History Of Current Problem  Pt admitted with c/o R sided numbness/tingling; TPA administered in ED, stopped at 1608 on  due to possible bleed; 4/10 MRI acute lacunar infarct in L basal ganglia  -MM    Precautions/Limitations  fall precautions  -MM    Prior Level of Function  independent:;all household  mobility;community mobility;gait;driving;bathing;dressing;ADL's  -MM    Equipment Currently Used at Home none  -BM none  -MM    Plans/Goals Discussed With  patient;agreed upon  -MM    Risks Reviewed  patient:;nausea/vomiting;LOB;dizziness;increased discomfort;change in vital signs  -MM    Benefits Reviewed  patient:;increase independence;improve function;increase balance;increase strength;decrease pain;decrease risk of DVT;improve skin integrity;increase knowledge  -MM    Barriers to Rehab  medically complex;physical barrier  -MM    Living Environment    Lives With  significant other  -MM    Living Arrangements  apartment  -MM    Home Accessibility  tub/shower is not walk in  -MM    Stair Railings at Home  none  -MM    Transportation Available car;family or friend will provide  -BM ambulance;family or friend will provide  -MM    Living Environment Comment  Pt states she lives with boyfriend most of the time, but pt states both apt's don't have stairs and have tub/shower combo  -MM    Vital Signs    Pre Systolic BP Rehab  127  -MM    Pre Treatment Diastolic BP  77  -MM    Pretreatment Heart Rate (beats/min)  109  -MM    Pre SpO2 (%)  97  -MM    O2 Delivery Pre Treatment  room air  -MM    Pre Patient Position  --   fowlers  -MM    Intra Patient Position  Sitting  -MM    Post Patient Position  Sitting  -MM    Pain Assessment    Pain Assessment  No/denies pain  -MM    Vision Assessment/Intervention    Visual Impairment  WFL with corrective lenses  -MM    Cognitive Assessment/Intervention    Current Cognitive/Communication Assessment  functional  -MM    Orientation Status  oriented x 4  -MM    Follows Commands/Answers Questions  100% of the time;able to follow multi-step instructions  -MM    Personal Safety  decreased awareness, need for assist;decreased awareness, need for safety  -MM    Personal Safety Interventions  fall prevention program maintained;gait belt;muscle strengthening facilitated;nonskid shoes/slippers  when out of bed;supervised activity  -MM    ROM (Range of Motion)    General ROM Detail  BUE AROM WFL, R arm moves slower than L  -MM    MMT (Manual Muscle Testing)    General MMT Assessment Detail  R hand 3+/5, L hand 4-/5, B tricep/bicep 4-/5  -MM    Bed Mobility, Assessment/Treatment    Bed Mobility, Assistive Device  bed rails;head of bed elevated  -MM    Bed Mob, Supine to Sit, [place]  supervision required;verbal cues required  -MM    Bed Mob, Sit to Supine, [place]  not tested   up in chair  -MM    Bed Mobility, Safety Issues  decreased use of arms for pushing/pulling;decreased use of legs for bridging/pushing  -MM    Bed Mobility, Impairments  strength decreased;impaired balance  -MM    Transfer Assessment/Treatment    Transfers, Sit-Stand [place]  contact guard assist;2 person assist required;verbal cues required  -MM    Transfers, Stand-Sit [place]  contact guard assist;verbal cues required;2 person assist required  -MM    Transfers, Sit-Stand-Sit, Assist Device  --   HHA x2  -MM    Transfer, Safety Issues  balance decreased during turns;weight-shifting ability decreased;step length decreased  -MM    Transfer, Impairments  strength decreased;impaired balance  -MM    Motor Skills/Interventions    Additional Documentation  Fine Motor Coordination Training (Group);Gross Motor Coordination Training (Group)  -MM    Fine Motor Coordination Training    Opposition  Right:;Left:;impaired  -MM    Gross Motor Coordination Training    Gross Motor Skill, Impairments Detail  Finger to nose: LUE intact, RUE impaired d/t slow movements   -MM    Sensory Assessment/Intervention    Light Touch  LUE;RUE  -MM    LUE Light Touch  WNL  -MM    RUE Light Touch  mild impairment  -MM    RUE Sharp/Dull Discrimination  WNL  -MM    RLE Sharp/Dull Discrimination  mild impairment  -MM    Positioning and Restraints    Pre-Treatment Position  in bed  -MM    Post Treatment Position  chair  -MM    In Chair  notified  nsg;reclined;call light within reach;encouraged to call for assist;legs elevated   within staff view  -MM      04/11/17 0810 04/11/17 0800    Rehab Evaluation    Document Type evaluation   See MAR  -NATALY (r) MAGUI (t) NATALY (c)     Subjective Information agree to therapy;complains of;numbness;dyspnea;weakness;fatigue   n/t R side, SOB from chronic asthma per pt  -NATALY (r) TS (t) NATALY (c)     General Information    Patient Profile Review yes  -NATALY (r) TS (t) NATALY (c)     Onset of Illness/Injury or Date of Surgery Date 04/09/17  -NATALY (r) TS (t) NATALY (c)     Referring Physician Dr. Singletary   stroke  -NATALY (r) TS (t) NATALY (c)     General Observations fowlers, RN in room, telemetry  -NATALY (r) MAGUI (t) NATALY (c)     Pertinent History Of Current Problem Pt admitted with c/o R sided numbness/tingling; TPA administered in ED, stopped at 1608 on 4/9 due to possible bleed; 4/10 MRI acute lacunar infarct in L basal ganglia  -NATALY (r) TS (t) NATALY (c)     Precautions/Limitations fall precautions  -NATALY (r) TS (t) NATALY (c)     Prior Level of Function independent:;all household mobility;community mobility;gait;driving;bathing;dressing;ADL's  -NATALY (r) TS (t) NATALY (c)     Equipment Currently Used at Home none  -NATALY (r) TS (t) NATALY (c)     Plans/Goals Discussed With patient;agreed upon  -NATALY (r) TS (t) NATALY (c)     Risks Reviewed patient:;LOB;nausea/vomiting;dizziness;change in vital signs  -NATALY (r) TS (t) NATALY (c)     Benefits Reviewed patient:;improve function;increase independence;increase strength;increase balance  -NATALY (r) TS (t) NATALY (c)     Barriers to Rehab medically complex  -NATALY (r) TS (t) NATALY (c)     Living Environment    Lives With significant other  -NATALY (r) TS (t) NATALY (c)     Living Arrangements apartment  -NATALY (r) TS (t) NATALY (c)     Home Accessibility tub/shower is not walk in;bed and bath on same level  -NATALY (r) TS (t) NATALY (c)     Stair Railings at Home none  -NATALY stallings) MAGUI (anson) NATALY brooks)     Clinical Impression    Date of Referral to PT 04/09/17  -NATALY rider (r)) NATALY brooks)     PT Diagnosis  Impaired mobility  -NATALY (r) TS (t) NATALY (c)     Functional Level At Time Of Evaluation ambulated 185' with CGA/min assist x2  -NATALY (r) TS (t) NATALY (c)     Patient/Family Goals Statement to get better  -NATAYL (r) TS (t) NATALY (c)     Criteria for Skilled Therapeutic Interventions Met yes;treatment indicated  -NATALY (r) TS (t) NATALY (c)     Impairments Found (describe specific impairments) aerobic capacity/endurance;gait, locomotion, and balance;motor function;muscle performance  -NATALY (r) TS (t) NATALY (c)     Functional Limitations in Following Categories (Describe Specific Limitations) self-care;home management;community/leisure  -NATALY (r) TS (t) NATALY (c)     Disability: Inability to Perform Actions/Activities of Required Roles (describe specific disability) community/leisure  -NATALY (r) TS (t) NATALY (c)     Rehab Potential good, to achieve stated therapy goals  -NATALY (r) TS (t) NATALY (c)     Predicted Duration of Therapy Intervention (days/wks) until d/c  -NATALY (r) TS (t) NATALY (c)     Vital Signs    Pre Systolic BP Rehab 127  -NATALY (r) TS (t) NATALY (c)     Pre Treatment Diastolic BP 77  -NATALY (r) TS (t) NATALY (c)     Post Systolic BP Rehab 117  -NATALY (r) TS (t) NATALY (c)     Post Treatment Diastolic BP 97  -NATALY (r) TS (t) NATALY (c)     Pretreatment Heart Rate (beats/min) 109  -NATALY (r) TS (t) NATALY (c)     Intratreatment Heart Rate (beats/min) 124  -NATALY (r) TS (t) NATALY (c)     Posttreatment Heart Rate (beats/min) 109  -NATALY (r) TS (t) NATALY (c)     Pre SpO2 (%) 97  -NATALY (r) TS (t) NATALY (c)     O2 Delivery Pre Treatment room air  -NATALY (r) TS (t) NATALY (c)     Post SpO2 (%) 97  -NATALY (r) TS (t) NATALY (c)     O2 Delivery Post Treatment room air  -NATALY (r) TS (t) NATALY (c)     Pre Patient Position Supine  -NATALY (r) TS (t) NATALY (c)     Intra Patient Position Standing  -NATALY (r) TS (t) NATALY (c)     Post Patient Position Sitting  -NATALY (r) TS (t) NATALY (c)     Pain Assessment    Pain Assessment No/denies pain  -NATALY (r) TS (t) NATALY (c)     Vision Assessment/Intervention    Visual Impairment WFL with corrective lenses   per pt   -NATALY (r) TS (t) NATALY (c)     Cognitive Assessment/Intervention    Current Cognitive/Communication Assessment functional  -NATALY (r) TS (t) NATALY (c)     Orientation Status oriented x 4  -NATALY (r) TS (t) NATALY (c)     Follows Commands/Answers Questions 100% of the time;able to follow multi-step instructions;needs cueing  -NATALY (r) TS (t) NATALY (c)     Personal Safety decreased awareness, need for assist;decreased awareness, need for safety  -NATALY (r) TS (t) NATALY (c)     Personal Safety Interventions fall prevention program maintained;gait belt;nonskid shoes/slippers when out of bed;supervised activity  -NATALY (r) TS (t) NATALY (c)     ROM (Range of Motion)    General ROM Detail LLE WFL; RLE AROM limited 25% due to weakness, full PROM  -NATALY (r) TS (t) NATALY (c)     MMT (Manual Muscle Testing)    General MMT Assessment Detail LLE 4+/5; RLE 4-/5  -NATALY (r) TS (t) NATALY (c)     Muscle Tone Assessment    Muscle Tone Assessment  Right-Side Extremities  -CK (r) BM (t) CK (c)    Right-Side Extremities Muscle Tone Assessment  rigidity  -CK (r) BM (t) CK (c)    Bed Mobility, Assessment/Treatment    Bed Mob, Supine to Sit, [place] verbal cues required;supervision required  -NATALY (r) TS (t) NATALY (c)     Bed Mobility, Safety Issues decreased use of arms for pushing/pulling;decreased use of legs for bridging/pushing  -NATALY (r) TS (t) NATALY (c)     Bed Mobility, Impairments sensation decreased;strength decreased;coordination impaired;motor control impaired  -NATALY (r) TS (t) NATALY (c)     Transfer Assessment/Treatment    Transfers, Sit-Stand [place] verbal cues required;contact guard assist;2 person assist required  -NATALY (r) TS (t) NATALY (c)     Transfers, Stand-Sit [place] verbal cues required;contact guard assist;2 person assist required  -NATALY (r) TS (t) NATALY (c)     Transfer, Safety Issues balance decreased during turns;sequencing ability decreased  -NATALY (r) TS (t) NATALY (c)     Transfer, Impairments sensation decreased;strength decreased;impaired balance;coordination  impaired;motor control impaired  -NATALY (r) TS (t) NATALY (c)     Gait Assessment/Treatment    Gait, [place] Level verbal cues required;contact guard assist;minimum assist (75% patient effort);2 person assist required;upper extremity support  -NATALY (r) TS (t) NATALY (c)     Gait, Assistive Device other (see comments)   HHAx2  -NATALY (r) TS (t) NATALY (c)     Gait, Distance (Feet) 185  -NATALY (r) TS (t) NATALY (c)     Gait, Gait Pattern Analysis swing-through gait  -NATALY (r) TS (t) NATALY (c)     Gait, Gait Deviations eliseo decreased;decreased heel strike;narrow base;toe-to-floor clearance decreased;weight-shifting ability decreased;swing-to-stance ratio decreased  -NATALY (r) TS (t) NATALY (c)     Gait, Safety Issues balance decreased during turns;weight-shifting ability decreased  -NATALY (r) TS (t) NATALY (c)     Gait, Impairments sensation decreased;strength decreased;impaired balance;coordination impaired;motor control impaired  -NATALY (r) TS (t) NATALY (c)     Gait, Comment Pt demonstrated decreased R knee flexion with ambulation from possible lack of eccentric quad control, decreased stance time on R side which required min assist when RLE in stance phase of gait  -NATALY (r) TS (t) NATALY (c)     Motor Skills/Interventions    Additional Documentation Balance Skills Training (Group);Gross Motor Coordination Training (Group)  -NATALY (r) TS (t) NATALY (c)     Balance Skills Training    Sitting-Level of Assistance Close supervision  -NATALY (r) TS (t) NATALY (c)     Sitting-Balance Support Right upper extremity supported;Left upper extremity supported;Feet supported  -NATALY (r) TS (t) NATALY (c)     Standing-Level of Assistance Contact guard;Minimum assistance  -NATALY (r) TS (t) NATALY (c)     Static Standing Balance Support Right upper extremity supported;Left upper extremity supported  -NATALY (r) TS (t) NATALY (c)     Gait Balance-Level of Assistance Contact guard;Minimum assistance  -NATALY (r) TS (t) NATALY (c)     Gait Balance Support Right upper extremity supported;Left upper extremity supported  -NATALY (r)  TS (t) NATALY (c)     Gross Motor Coordination Training    Gross Motor Skill, Impairments Detail Heel to shin intact bilaterally  -NATALY (r) TS (t) NATALY (c)     Sensory Assessment/Intervention    Light Touch LLE;RLE  -NATALY (r) TS (t) NATALY (c) Head/Neck  -CK (r) BM (t) CK (c)    RUE Light Touch  mild impairment  -CK (r) BM (t) CK (c)    LLE Light Touch WNL  -NATALY (r) TS (t) NATALY (c)     RLE Light Touch mild impairment  -NATALY (r) TS (t) NATALY (c) mild impairment  -CK (r) BM (t) CK (c)    Head/Neck Light Touch  mild impairment  -CK (r) BM (t) CK (c)    Sharp/Dull Discrimination  RUE;RLE  -CK (r) BM (t) CK (c)    RUE Sharp/Dull Discrimination  mild impairment  -CK (r) BM (t) CK (c)    RLE Sharp/Dull Discrimination  mild impairment  -CK (r) BM (t) CK (c)    Deep Pressure LLE;RLE  -NATALY (r) TS (t) NATALY (c)     LLE Deep Pressure WNL  -NATALY (r) TS (t) NATALY (c)     RLE Deep Pressure mild impairment  -NATALY (r) TS (t) NATALY (c)     Positioning and Restraints    Pre-Treatment Position in bed  -NATALY (r) TS (t) NATALY (c)     Post Treatment Position chair  -NATALY (r) TS (t) NATALY (c)     In Chair notified nsg;reclined;call light within reach;encouraged to call for assist;patient within staff view  -NATALY (r) TS (t) NATALY (c)       04/11/17 0400 04/11/17 0000    Muscle Tone Assessment    Muscle Tone Assessment Right-Side Extremities  -SS Right-Side Extremities  -SS    Right-Side Extremities Muscle Tone Assessment rigidity  -SS rigidity  -SS    Sensory Assessment/Intervention    Light Touch Head/Neck  -SS Head/Neck  -SS    RUE Light Touch mild impairment  -SS mild impairment  -SS    RLE Light Touch mild impairment  -SS mild impairment  -SS    Head/Neck Light Touch mild impairment  -SS mild impairment  -SS    Sharp/Dull Discrimination RUE;RLE  -SS RUE;RLE  -SS    RUE Sharp/Dull Discrimination mild impairment  -SS mild impairment  -SS    RLE Sharp/Dull Discrimination mild impairment  -SS mild impairment  -SS      04/10/17 2000 04/10/17 1700    Muscle Tone Assessment    Muscle Tone  Assessment Right-Side Extremities  -SS Right-Side Extremities  -TC    Right-Side Extremities Muscle Tone Assessment rigidity  -SS mildly increased tone  -TC    Sensory Assessment/Intervention    Light Touch Head/Neck  -SS Head/Neck  -TC    RUE Light Touch mild impairment  -SS mild impairment  -TC    RLE Light Touch mild impairment  -SS mild impairment  -TC    Head/Neck Light Touch mild impairment  -SS mild impairment  -TC    Sharp/Dull Discrimination RUE;RLE  -SS RUE;RLE  -TC    RUE Sharp/Dull Discrimination mild impairment  -SS mild impairment  -TC    RLE Sharp/Dull Discrimination mild impairment  -SS mild impairment  -TC      04/10/17 1600 04/10/17 1500    Muscle Tone Assessment    Muscle Tone Assessment Right-Side Extremities  -TC Right-Side Extremities  -TC    Right-Side Extremities Muscle Tone Assessment rigidity  -TC mildly increased tone  -TC    Sensory Assessment/Intervention    Light Touch Head/Neck  -TC Head/Neck  -TC    RUE Light Touch mild impairment  -TC mild impairment  -TC    RLE Light Touch mild impairment  -TC mild impairment  -TC    Head/Neck Light Touch mild impairment  -TC mild impairment  -TC    Sharp/Dull Discrimination RUE;RLE  -TC RUE;RLE  -TC    RUE Sharp/Dull Discrimination mild impairment  -TC mild impairment  -TC    RLE Sharp/Dull Discrimination mild impairment  -TC mild impairment  -TC      04/10/17 1400 04/10/17 1300    Muscle Tone Assessment    Muscle Tone Assessment Right-Side Extremities  -TC Right-Side Extremities  -TC    Right-Side Extremities Muscle Tone Assessment mildly increased tone  -TC mildly increased tone  -TC    Sensory Assessment/Intervention    Light Touch Head/Neck  -TC Head/Neck  -TC    RUE Light Touch mild impairment  -TC mild impairment  -TC    RLE Light Touch mild impairment  -TC mild impairment  -TC    Head/Neck Light Touch mild impairment  -TC mild impairment  -TC    Sharp/Dull Discrimination RUE;RLE  -TC RUE;RLE  -TC    RUE Sharp/Dull Discrimination mild  impairment  -TC mild impairment  -TC    RLE Sharp/Dull Discrimination mild impairment  -TC mild impairment  -TC      04/10/17 1200 04/10/17 1100    Muscle Tone Assessment    Muscle Tone Assessment Right-Side Extremities  -TC Right-Side Extremities  -TC    Right-Side Extremities Muscle Tone Assessment rigidity  -TC mildly increased tone  -TC    Sensory Assessment/Intervention    Light Touch  Head/Neck  -TC    RUE Light Touch  mild impairment  -TC    RLE Light Touch  mild impairment  -TC    Head/Neck Light Touch  mild impairment  -TC    Sharp/Dull Discrimination  RUE;RLE  -TC    RUE Sharp/Dull Discrimination  mild impairment  -TC    RLE Sharp/Dull Discrimination  mild impairment  -TC      04/10/17 1000 04/10/17 0925    General Information    Equipment Currently Used at Home  none  -    Living Environment    Lives With  significant other  -    Living Arrangements  apartment;house  -    Transportation Available  car;family or friend will provide  -DH    Muscle Tone Assessment    Muscle Tone Assessment Right-Side Extremities  -TC     Right-Side Extremities Muscle Tone Assessment mildly increased tone  -TC     Sensory Assessment/Intervention    Light Touch Head/Neck  -TC     RUE Light Touch mild impairment  -TC     RLE Light Touch mild impairment  -TC     Head/Neck Light Touch mild impairment  -TC     Sharp/Dull Discrimination RUE;RLE  -TC     RUE Sharp/Dull Discrimination mild impairment  -TC     RLE Sharp/Dull Discrimination mild impairment  -TC       04/10/17 0900 04/10/17 0839    Rehab Evaluation    Evaluation Not Performed  other (see comments)   Pt received tPA, on hold until 1608 on 4/10.  -NATALY    Muscle Tone Assessment    Muscle Tone Assessment Right-Side Extremities  -TC     Right-Side Extremities Muscle Tone Assessment mildly increased tone  -TC     Sensory Assessment/Intervention    Light Touch Head/Neck  -TC     RUE Light Touch mild impairment  -TC     RLE Light Touch mild impairment  -TC     Head/Neck  Light Touch mild impairment  -TC     Sharp/Dull Discrimination RUE;RLE  -TC     RUE Sharp/Dull Discrimination mild impairment  -TC     RLE Sharp/Dull Discrimination mild impairment  -TC       04/10/17 0815 04/10/17 0800    Rehab Evaluation    Document Type evaluation  -CS     Subjective Information agree to therapy;complains of;weakness  -CS     Pain Assessment    Pain Assessment No/denies pain  -CS     Muscle Tone Assessment    Muscle Tone Assessment  Right-Side Extremities  -TC    Right-Side Extremities Muscle Tone Assessment  mildly increased tone  -TC    Sensory Assessment/Intervention    Light Touch  Head/Neck  -TC    RUE Light Touch  mild impairment  -TC    RLE Light Touch  mild impairment  -TC    Head/Neck Light Touch  mild impairment  -TC    Sharp/Dull Discrimination  RUE;RLE  -TC    RUE Sharp/Dull Discrimination  mild impairment  -TC    RLE Sharp/Dull Discrimination  mild impairment  -TC      04/10/17 0730 04/10/17 0700    Muscle Tone Assessment    Muscle Tone Assessment Right-Side Extremities  -TC Right-Side Extremities  -MC    Right-Side Extremities Muscle Tone Assessment rigidity  -TC rigidity  -MC    Sensory Assessment/Intervention    Light Touch  Head/Neck  -    RUE Light Touch  mild impairment  -    RLE Light Touch  mild impairment  -    Head/Neck Light Touch  mild impairment  -    Sharp/Dull Discrimination  RUE;RLE  -    RUE Sharp/Dull Discrimination  mild impairment  -    RLE Sharp/Dull Discrimination  mild impairment  -      04/10/17 0600 04/10/17 0500    Muscle Tone Assessment    Muscle Tone Assessment Right-Side Extremities  -MC Right-Side Extremities  -    Right-Side Extremities Muscle Tone Assessment rigidity  -MC rigidity  -    Sensory Assessment/Intervention    Light Touch Head/Neck  - Head/Neck  -MC    RUE Light Touch mild impairment  - mild impairment  -    RLE Light Touch mild impairment  - mild impairment  -    Head/Neck Light Touch mild impairment  - mild  impairment  -MC    Sharp/Dull Discrimination RUE;RLE  -MC RUE;RLE  -MC    RUE Sharp/Dull Discrimination mild impairment  -MC mild impairment  -MC    RLE Sharp/Dull Discrimination mild impairment  -MC mild impairment  -MC      04/10/17 0400 04/10/17 0300    Muscle Tone Assessment    Muscle Tone Assessment Right-Side Extremities  -MC Right-Side Extremities  -MC    Right-Side Extremities Muscle Tone Assessment rigidity  -MC rigidity  -MC    Sensory Assessment/Intervention    Light Touch Head/Neck  -MC Head/Neck  -MC    RUE Light Touch mild impairment  -MC mild impairment  -MC    RLE Light Touch mild impairment  -MC mild impairment  -MC    Head/Neck Light Touch mild impairment  -MC mild impairment  -MC    Sharp/Dull Discrimination RUE;RLE  -MC RUE;RLE  -MC    RUE Sharp/Dull Discrimination mild impairment  -MC mild impairment  -MC    RLE Sharp/Dull Discrimination mild impairment  -MC mild impairment  -MC      04/10/17 0200 04/10/17 0100    Muscle Tone Assessment    Muscle Tone Assessment Right-Side Extremities  -MC Right-Side Extremities  -SS    Right-Side Extremities Muscle Tone Assessment rigidity  -MC rigidity  -SS    Sensory Assessment/Intervention    Light Touch Head/Neck  -MC Head/Neck;RLE;RUE  -SS    RUE Light Touch mild impairment  -MC mild impairment  -SS    RLE Light Touch mild impairment  -MC mild impairment  -SS    Head/Neck Light Touch mild impairment  -MC moderate impairment  -SS    Sharp/Dull Discrimination RUE;RLE  -MC RUE;RLE  -SS    RUE Sharp/Dull Discrimination mild impairment  -MC mild impairment  -SS    RLE Sharp/Dull Discrimination mild impairment  -MC mild impairment  -SS      04/10/17 0000 04/09/17 2330    Muscle Tone Assessment    Muscle Tone Assessment Right-Side Extremities  -SS Right-Side Extremities  -SS    Right-Side Extremities Muscle Tone Assessment rigidity  -SS rigidity  -SS    Sensory Assessment/Intervention    Light Touch Head/Neck;RLE;RUE  -SS Head/Neck;RLE;RUE  -SS    RUE Light Touch  mild impairment  -SS mild impairment  -SS    RLE Light Touch mild impairment  -SS mild impairment  -SS    Head/Neck Light Touch moderate impairment  -SS moderate impairment  -SS    Sharp/Dull Discrimination RLE;RUE  -SS RUE;RLE  -SS    RUE Sharp/Dull Discrimination mild impairment  -SS mild impairment  -SS    RLE Sharp/Dull Discrimination mild impairment  -SS mild impairment  -SS      04/09/17 2300 04/09/17 2230    Muscle Tone Assessment    Muscle Tone Assessment Right-Side Extremities  -SS Right-Side Extremities  -SS    Right-Side Extremities Muscle Tone Assessment rigidity  -SS rigidity  -SS    Sensory Assessment/Intervention    Light Touch Head/Neck;RLE;RUE  -SS Head/Neck;RLE;RUE  -SS    RUE Light Touch mild impairment  -SS mild impairment  -SS    RLE Light Touch mild impairment  -SS mild impairment  -SS    Head/Neck Light Touch moderate impairment  -SS moderate impairment  -SS    Sharp/Dull Discrimination RUE;RLE  -SS RUE;RLE  -SS    RUE Sharp/Dull Discrimination mild impairment  -SS mild impairment  -SS    RLE Sharp/Dull Discrimination mild impairment  -SS mild impairment  -SS      04/09/17 2200 04/09/17 2130    Muscle Tone Assessment    Muscle Tone Assessment Right-Side Extremities  -SS Right-Side Extremities  -SS    Right-Side Extremities Muscle Tone Assessment rigidity  -SS rigidity  -SS    Sensory Assessment/Intervention    Light Touch Head/Neck;RLE;RUE  -SS Head/Neck;RLE;RUE  -SS    RUE Light Touch mild impairment  -SS moderate impairment  -SS    RLE Light Touch mild impairment  -SS moderate impairment  -SS    Head/Neck Light Touch moderate impairment  -SS moderate impairment  -SS    Sharp/Dull Discrimination RUE;RLE  -SS RUE;RLE  -SS    RUE Sharp/Dull Discrimination mild impairment  -SS moderate impairment  -SS    RLE Sharp/Dull Discrimination mild impairment  -SS moderate impairment  -SS      04/09/17 2120 04/09/17 2100    Muscle Tone Assessment    Muscle Tone Assessment Right-Side Extremities  -SS      Right-Side Extremities Muscle Tone Assessment rigidity  -SS     Sensory Assessment/Intervention    Light Touch Head/Neck;RLE;RUE  -SS Head/Neck;RLE;RUE  -SS    RUE Light Touch moderate impairment  -SS mild impairment  -SS    RLE Light Touch moderate impairment  -SS mild impairment  -SS    Head/Neck Light Touch moderate impairment  -SS moderate impairment  -SS    Sharp/Dull Discrimination RUE;RLE  -SS RLE;RUE  -SS    RUE Sharp/Dull Discrimination moderate impairment  -SS mild impairment  -SS    RLE Sharp/Dull Discrimination moderate impairment  -SS mild impairment  -SS      04/09/17 2030 04/09/17 2000    Sensory Assessment/Intervention    Light Touch Head/Neck;RLE;RUE  -SS Head/Neck;RLE;RUE  -SS    RUE Light Touch mild impairment  -SS mild impairment  -SS    RLE Light Touch mild impairment  -SS mild impairment  -SS    Head/Neck Light Touch moderate impairment  -SS moderate impairment  -SS    Sharp/Dull Discrimination RLE;RUE  -SS RLE;RUE  -SS    RUE Sharp/Dull Discrimination mild impairment  -SS mild impairment  -SS    RLE Sharp/Dull Discrimination mild impairment  -SS mild impairment  -SS      04/09/17 1930 04/09/17 1900    Sensory Assessment/Intervention    Light Touch Head/Neck;RLE;RUE  -SS Head/Neck;RLE;RUE  -TCA    RUE Light Touch mild impairment  -SS mild impairment  -TCA    RLE Light Touch mild impairment  -SS mild impairment  -TCA    Head/Neck Light Touch moderate impairment  -SS moderate impairment  -TCA    Sharp/Dull Discrimination RLE;RUE  -SS     RUE Sharp/Dull Discrimination mild impairment  -SS     RLE Sharp/Dull Discrimination mild impairment  -SS       04/09/17 1830 04/09/17 1821    Living Environment    Lives With  significant other  -TCA    Living Arrangements  apartment;house  -TCA    Home Accessibility  no concerns  -TCA    Stair Railings at Home  none  -TCA    Type of Financial/Environmental Concern  none  -TCA    Transportation Available  car;family or friend will provide  -TCA    Living  Environment Comment  alternates living space with boyfriend  -TCA    Sensory Assessment/Intervention    Light Touch Head/Neck;RLE;RUE  -TCA     RUE Light Touch mild impairment  -TCA     RLE Light Touch mild impairment  -TCA     Head/Neck Light Touch moderate impairment  -TCA       04/09/17 1815 04/09/17 1800    General Information    Equipment Currently Used at Home none  -TCA     Sensory Assessment/Intervention    Light Touch Head/Neck;RLE;RUE  -TCA Head/Neck;RLE;RUE  -TCA    RUE Light Touch mild impairment  -TCA mild impairment  -TCA    RLE Light Touch mild impairment  -TCA mild impairment  -TCA    Head/Neck Light Touch moderate impairment  -TCA moderate impairment  -TCA      04/09/17 1745       Sensory Assessment/Intervention    Light Touch Head/Neck;RLE;RUE  -TCA     RUE Light Touch mild impairment  -TCA     RLE Light Touch mild impairment  -TCA     Head/Neck Light Touch moderate impairment  -TCA       User Key  (r) = Recorded By, (t) = Taken By, (c) = Cosigned By    Initials Name Provider Type    NATALY Dent, PT DPT Physical Therapist    KANNAN Delacruz, RN Registered Nurse    TCA nAn Marie Powell, RN Registered Nurse    TC José Miguel Brandt, RN Registered Nurse    CK Soraya Francois, RN Registered Nurse    SS Aysha Hawk, RN Registered Nurse     Sharri Sanon, [facility]     CS Matthew Noland, MS, CFY-SLP Speech and Language Pathologist    MM Dre De Oliveira, OTR/L Occupational Therapist    BM Jessica Segovia, RN, BSN Registered Nurse    TS Karson Benton, PT Student PT Student          Physical Therapy Education     Title: PT OT SLP Therapies (Active)     Topic: Physical Therapy (Active)     Point: Mobility training (Done)    Learning Progress Summary    Learner Readiness Method Response Comment Documented by Status   Patient Acceptance E VU  BM 04/11/17 1045 Done    Acceptance E VU Pt educated on deficits found during exam and PT plan of care. TS 04/11/17 0935 Done   Family Acceptance E  VU   04/11/17 1045 Done                      User Key     Initials Effective Dates Name Provider Type Discipline     01/09/17 -  Jessica Segovia, RN, BSN Registered Nurse Nurse     03/09/17 -  Karson Benton, PT Student PT Student PT                PT Recommendation and Plan  Anticipated Discharge Disposition: inpatient rehabilitation facility  Planned Therapy Interventions: balance training, bed mobility training, gait training, home exercise program, patient/family education, strengthening, transfer training, stair training, neuromuscular re-education, motor coordination training  PT Frequency: daily, 2 times/day  Plan of Care Review  Plan Of Care Reviewed With: patient  Outcome Summary/Follow up Plan: PT eval completed. Pt with sensation and strength deficits on R side limiting functional mobility. Pt was performed bed mobility with supervision, standing and ambulation performed with CGA/min assist x2. Pt demonstrated decreased static and dynamic standing balance. Pt will benenfit from continued PT intervention to improve functional strength, gait, and balance. Anticipate d/c to IRF pending progress.          IP PT Goals       04/11/17 0929          Transfer Training PT LTG    Transfer Training PT LTG, Date Established 04/11/17  -NATALY (r) TS (t) NATALY (c)      Transfer Training PT LTG, Time to Achieve by discharge  -NATALY (r) TS (t) NATALY (c)      Transfer Training PT LTG, Activity Type sit to stand/stand to sit  -NATALY (r) TS (t) NATALY (c)      Transfer Training PT LTG, [place] Level supervision required  -NATALY (r) TS (t) NATALY (c)      Gait Training PT LTG    Gait Training Goal PT LTG, Date Established 04/11/17  -NATALY (r) TS (t) NATALY (c)      Gait Training Goal PT LTG, Time to Achieve by discharge  -NATALY (r) TS (t) NATALY (c)      Gait Training Goal PT LTG, [place] Level supervision required  -NATALY (r) TS (t) NATALY (c)      Gait Training Goal PT LTG, Distance to Achieve 250  -NATALY (r) TS (t) NATALY (c)      Strength Goal PT LTG     Strength Goal PT LTG, Date Established 04/11/17  -NATALY (r) TS (t) NATALY (c)      Strength Goal PT LTG, Time to Achieve by discharge  -NATALY (r) TS (t) NATALY (c)      Strength Goal PT LTG, Measure to Achieve Pt will complete 10 repetitions of RLE exercise with verbal cues  -NATALY (r) TS (t) NATALY (c)      Static Standing Balance PT LTG    Static Standing Balance PT LTG, Date Established 04/11/17  -NATALY (r) TS (t) NATALY (c)      Static Standing Balance PT LTG, Time to Achieve by discharge  -NATALY (r) TS (t) NATALY (c)      Static Standing Balance PT LTG, [place] Level supervision required  -NATALY (r) TS (t) NATALY (c)      Static Standing Balance PT LTG, Additional Goal Pt will stand for 1 minute with no LOB  -NATALY (r) TS (t) NATALY (c)        User Key  (r) = Recorded By, (t) = Taken By, (c) = Cosigned By    Initials Name Provider Type    NATALY Dent, PT DPT Physical Therapist    MAGUI Benton, PT Student PT Student                Outcome Measures       04/11/17 0900 04/11/17 0810       How much help from another person do you currently need...    Turning from your back to your side while in flat bed without using bedrails?  3  -NATALY (r) TS (t) NATALY (c)     Moving from lying on back to sitting on the side of a flat bed without bedrails?  3  -NATALY (r) TS (t) NATALY (c)     Moving to and from a bed to a chair (including a wheelchair)?  3  -NATALY (r) TS (t) NATALY (c)     Standing up from a chair using your arms (e.g., wheelchair, bedside chair)?  3  -NATALY (r) TS (t) NATALY (c)     Climbing 3-5 steps with a railing?  2  -NATALY (r) TS (t) NATALY (c)     To walk in hospital room?  3  -NATALY (r) TS (t) NATALY (c)     AM-PAC 6 Clicks Score  17  -NATALY (r) TS (t)     How much help from another is currently needed...    Putting on and taking off regular lower body clothing? 3  -MM      Bathing (including washing, rinsing, and drying) 3  -MM      Toileting (which includes using toilet bed pan or urinal) 3  -MM      Putting on and taking off regular upper body clothing 3  -MM      Taking  care of personal grooming (such as brushing teeth) 3  -MM      Eating meals 3  -MM      Score 18  -MM      Functional Assessment    Outcome Measure Options AM-PAC 6 Clicks Daily Activity (OT)  -MM AM-PAC 6 Clicks Basic Mobility (PT)  -NATALY (r) TS (t) NATALY (c)       User Key  (r) = Recorded By, (t) = Taken By, (c) = Cosigned By    Initials Name Provider Type    NATALY Dent, PT DPT Physical Therapist    MM Dre De Oliveira, OTR/L Occupational Therapist     Karson Benton, PT Student PT Student           Time Calculation:         PT Charges       04/11/17 0936          Time Calculation    Start Time 0810  -NATALY (r) TS (t) NATALY (c)      Stop Time 0907  -NATALY (r) TS (t) NATALY (c)      Time Calculation (min) 57 min  -NATALY (r) TS (t)      PT Received On 04/11/17  -NATALY (r) TS (t) NATALY (c)      PT Goal Re-Cert Due Date 04/21/17  -NATALY (r) TS (t) NATALY (c)        User Key  (r) = Recorded By, (t) = Taken By, (c) = Cosigned By    Initials Name Provider Type    NATALY Dent, PT DPT Physical Therapist     Karson Benton, PT Student PT Student          Therapy Charges for Today     Code Description Service Date Service Provider Modifiers Qty    40510425019 HC PT EVAL MOD COMPLEXITY 4 4/11/2017 Karson Benton PT Student GP, KX 1          PT G-Codes  Outcome Measure Options: AM-PAC 6 Clicks Daily Activity (OT)  Score: 17  Functional Limitation: Mobility: Walking and moving around  Mobility: Walking and Moving Around Current Status (): At least 40 percent but less than 60 percent impaired, limited or restricted  Mobility: Walking and Moving Around Goal Status (): At least 1 percent but less than 20 percent impaired, limited or restricted      Karson Benton PT Student  4/11/2017

## 2017-04-11 NOTE — PLAN OF CARE
Problem: Patient Care Overview (Adult)  Goal: Plan of Care Review  Outcome: Ongoing (interventions implemented as appropriate)    04/11/17 0421   Outcome Evaluation   Outcome Summary/Follow up Plan OT tx completed. Pt completed toileting CGA. Pt completed BUE AROM HEP in all planes 15 reps. Pt was CGA for functional mobility. Skilled OT recommended to cont to address adls, functional moblility and strength.   Patient Care Overview   Progress progress toward functional goals as expected   Coping/Psychosocial Response Interventions   Plan Of Care Reviewed With patient

## 2017-04-11 NOTE — PLAN OF CARE
Problem: Patient Care Overview (Adult)  Goal: Plan of Care Review  Outcome: Ongoing (interventions implemented as appropriate)    04/11/17 1402   Outcome Evaluation   Outcome Summary/Follow up Plan Pt to continue regular diet with thin liquids. ST signing off at this time as services are no longer warranted. MD to re-consult if an issue arises which requires ST services.   Patient Care Overview   Progress improving   Coping/Psychosocial Response Interventions   Plan Of Care Reviewed With patient         Problem: Inpatient SLP  Goal: Dysphagia- Patient will safely consume diet as per recommendation with no signs/symptoms of aspiration  Outcome: Outcome(s) achieved Date Met:  04/11/17    04/10/17 0912 04/11/17 1402   Safely Consume Diet   Safely Consume Diet- SLP, Date Established 04/10/17 --    Safely Consume Diet- SLP, Time to Achieve by discharge --    Safely Consume Diet- SLP, Additional Goal Pt will tolerate recommended diet w/o any overt s/s of aspiration. --    Safely Consume Diet- SLP, Date Goal Reviewed --  04/11/17   Safely Consume Diet- SLP, Outcome --  goal met

## 2017-04-11 NOTE — PLAN OF CARE
Problem: Patient Care Overview (Adult)  Goal: Plan of Care Review  Outcome: Ongoing (interventions implemented as appropriate)    04/11/17 0929   Outcome Evaluation   Outcome Summary/Follow up Plan PT eval completed. Pt with sensation and strength deficits on R side limiting functional mobility. Pt was performed bed mobility with supervision, standing and ambulation performed with CGA/min assist x2. Pt demonstrated decreased static and dynamic standing balance. Pt will benenfit from continued PT intervention to improve functional strength, gait, and balance. Anticipate d/c to IRF pending progress.   Coping/Psychosocial Response Interventions   Plan Of Care Reviewed With patient         Problem: Inpatient Physical Therapy  Goal: Transfer Training Goal 1 LTG- PT  Outcome: Ongoing (interventions implemented as appropriate)    04/11/17 0929   Transfer Training PT LTG   Transfer Training PT LTG, Date Established 04/11/17   Transfer Training PT LTG, Time to Achieve by discharge   Transfer Training PT LTG, Activity Type sit to stand/stand to sit   Transfer Training PT LTG, [place] Level supervision required       Goal: Gait Training Goal LTG- PT  Outcome: Ongoing (interventions implemented as appropriate)    04/11/17 0929   Gait Training PT LTG   Gait Training Goal PT LTG, Date Established 04/11/17   Gait Training Goal PT LTG, Time to Achieve by discharge   Gait Training Goal PT LTG, [place] Level supervision required   Gait Training Goal PT LTG, Distance to Achieve 250       Goal: Strength Goal LTG- PT  Outcome: Ongoing (interventions implemented as appropriate)    04/11/17 0929   Strength Goal PT LTG   Strength Goal PT LTG, Date Established 04/11/17   Strength Goal PT LTG, Time to Achieve by discharge   Strength Goal PT LTG, Measure to Achieve Pt will complete 10 repetitions of RLE exercise with verbal cues       Goal: Static Standing Balance Goal LTG- PT  Outcome: Ongoing (interventions implemented as  appropriate)    04/11/17 0929   Static Standing Balance PT LTG   Static Standing Balance PT LTG, Date Established 04/11/17   Static Standing Balance PT LTG, Time to Achieve by discharge   Static Standing Balance PT LTG, [place] Level supervision required   Static Standing Balance PT LTG, Additional Goal Pt will stand for 1 minute with no LOB

## 2017-04-12 ENCOUNTER — HOSPITAL ENCOUNTER (INPATIENT)
Age: 67
LOS: 5 days | Discharge: HOME | DRG: 057 | End: 2017-04-17
Attending: PSYCHIATRY & NEUROLOGY | Admitting: PSYCHIATRY & NEUROLOGY
Payer: MEDICARE

## 2017-04-12 VITALS
WEIGHT: 134 LBS | OXYGEN SATURATION: 97 % | HEIGHT: 59 IN | TEMPERATURE: 97.9 F | HEART RATE: 101 BPM | RESPIRATION RATE: 18 BRPM | BODY MASS INDEX: 27.01 KG/M2 | SYSTOLIC BLOOD PRESSURE: 121 MMHG | DIASTOLIC BLOOD PRESSURE: 79 MMHG

## 2017-04-12 DIAGNOSIS — I63.30 CEREBROVASCULAR ACCIDENT (CVA) DUE TO THROMBOSIS OF CEREBRAL ARTERY (HCC): ICD-10-CM

## 2017-04-12 DIAGNOSIS — I10 ESSENTIAL HYPERTENSION: ICD-10-CM

## 2017-04-12 DIAGNOSIS — R53.1 WEAKNESS: Primary | ICD-10-CM

## 2017-04-12 DIAGNOSIS — R13.10 DYSPHAGIA, UNSPECIFIED TYPE: ICD-10-CM

## 2017-04-12 PROBLEM — E78.00 PURE HYPERCHOLESTEROLEMIA: Status: ACTIVE | Noted: 2017-04-12

## 2017-04-12 LAB
ALBUMIN SERPL-MCNC: 4.2 G/DL (ref 3.5–5.2)
ALP BLD-CCNC: 81 U/L (ref 35–104)
ALT SERPL-CCNC: 15 U/L (ref 5–33)
ANION GAP SERPL CALCULATED.3IONS-SCNC: 16 MMOL/L (ref 7–19)
AST SERPL-CCNC: 28 U/L (ref 5–32)
BACTERIA: ABNORMAL /HPF
BASOPHILS ABSOLUTE: 0 K/UL (ref 0–0.2)
BASOPHILS RELATIVE PERCENT: 0.3 % (ref 0–1)
BILIRUB SERPL-MCNC: 0.3 MG/DL (ref 0.2–1.2)
BILIRUBIN URINE: NEGATIVE
BLOOD, URINE: ABNORMAL
BUN BLDV-MCNC: 19 MG/DL (ref 8–23)
CALCIUM SERPL-MCNC: 9.2 MG/DL (ref 8.8–10.2)
CHLORIDE BLD-SCNC: 98 MMOL/L (ref 98–111)
CLARITY: CLEAR
CO2: 25 MMOL/L (ref 22–29)
COLOR: YELLOW
CREAT SERPL-MCNC: 0.8 MG/DL (ref 0.5–0.9)
EOSINOPHILS ABSOLUTE: 0.2 K/UL (ref 0–0.6)
EOSINOPHILS RELATIVE PERCENT: 1.3 % (ref 0–5)
EPITHELIAL CELLS, UA: 2 /HPF (ref 0–5)
GFR NON-AFRICAN AMERICAN: >60
GLOBULIN: 3.2 G/DL
GLUCOSE BLD-MCNC: 90 MG/DL (ref 74–109)
GLUCOSE URINE: NEGATIVE MG/DL
HCT VFR BLD CALC: 47 % (ref 37–47)
HEMOGLOBIN: 16 G/DL (ref 12–16)
HYALINE CASTS: 4 /HPF (ref 0–8)
KETONES, URINE: NEGATIVE MG/DL
LEUKOCYTE ESTERASE, URINE: ABNORMAL
LYMPHOCYTES ABSOLUTE: 2.5 K/UL (ref 1.1–4.5)
LYMPHOCYTES RELATIVE PERCENT: 21.2 % (ref 20–40)
MCH RBC QN AUTO: 30.1 PG (ref 27–31)
MCHC RBC AUTO-ENTMCNC: 34 G/DL (ref 33–37)
MCV RBC AUTO: 88.3 FL (ref 81–99)
MONOCYTES ABSOLUTE: 0.6 K/UL (ref 0–0.9)
MONOCYTES RELATIVE PERCENT: 5.3 % (ref 0–10)
NEUTROPHILS ABSOLUTE: 8.4 K/UL (ref 1.5–7.5)
NEUTROPHILS RELATIVE PERCENT: 71.9 % (ref 50–65)
NITRITE, URINE: NEGATIVE
PDW BLD-RTO: 12.9 % (ref 11.5–14.5)
PH UA: 5
PLATELET # BLD: 391 K/UL (ref 130–400)
PMV BLD AUTO: 8.9 FL (ref 7.4–10.4)
POTASSIUM SERPL-SCNC: 4.3 MMOL/L (ref 3.5–5)
PREALBUMIN: 24 MG/DL (ref 20–40)
PROTEIN UA: NEGATIVE MG/DL
RBC # BLD: 5.32 M/UL (ref 4.2–5.4)
RBC UA: 7 /HPF (ref 0–4)
SODIUM BLD-SCNC: 139 MMOL/L (ref 136–145)
SPECIFIC GRAVITY UA: 1.02
T4 FREE: 1.6 NG/ML (ref 0.9–1.7)
TOTAL PROTEIN: 7.4 G/DL (ref 6.6–8.7)
TSH SERPL DL<=0.05 MIU/L-ACNC: 1.71 UIU/ML (ref 0.27–4.2)
UROBILINOGEN, URINE: 0.2 E.U./DL
VITAMIN B-12: 556 PG/ML (ref 211–946)
WBC # BLD: 11.7 K/UL (ref 4.8–10.8)
WBC UA: 15 /HPF (ref 0–5)

## 2017-04-12 PROCEDURE — 36415 COLL VENOUS BLD VENIPUNCTURE: CPT

## 2017-04-12 PROCEDURE — 97535 SELF CARE MNGMENT TRAINING: CPT

## 2017-04-12 PROCEDURE — 84443 ASSAY THYROID STIM HORMONE: CPT

## 2017-04-12 PROCEDURE — 6370000000 HC RX 637 (ALT 250 FOR IP): Performed by: PSYCHIATRY & NEUROLOGY

## 2017-04-12 PROCEDURE — 84439 ASSAY OF FREE THYROXINE: CPT

## 2017-04-12 PROCEDURE — 81001 URINALYSIS AUTO W/SCOPE: CPT

## 2017-04-12 PROCEDURE — 99239 HOSP IP/OBS DSCHRG MGMT >30: CPT | Performed by: PSYCHIATRY & NEUROLOGY

## 2017-04-12 PROCEDURE — 84134 ASSAY OF PREALBUMIN: CPT

## 2017-04-12 PROCEDURE — 85025 COMPLETE CBC W/AUTO DIFF WBC: CPT

## 2017-04-12 PROCEDURE — 80053 COMPREHEN METABOLIC PANEL: CPT

## 2017-04-12 PROCEDURE — 1180000000 HC REHAB R&B

## 2017-04-12 PROCEDURE — 6360000002 HC RX W HCPCS: Performed by: PSYCHIATRY & NEUROLOGY

## 2017-04-12 PROCEDURE — 87086 URINE CULTURE/COLONY COUNT: CPT

## 2017-04-12 PROCEDURE — 97530 THERAPEUTIC ACTIVITIES: CPT

## 2017-04-12 PROCEDURE — 82607 VITAMIN B-12: CPT

## 2017-04-12 PROCEDURE — 87185 SC STD ENZYME DETCJ PER NZM: CPT

## 2017-04-12 PROCEDURE — 94799 UNLISTED PULMONARY SVC/PX: CPT

## 2017-04-12 RX ORDER — MONTELUKAST SODIUM 10 MG/1
10 TABLET ORAL NIGHTLY
COMMUNITY

## 2017-04-12 RX ORDER — ATORVASTATIN CALCIUM 80 MG/1
80 TABLET, FILM COATED ORAL NIGHTLY
Qty: 30 TABLET | Refills: 1 | Status: SHIPPED | OUTPATIENT
Start: 2017-04-12 | End: 2017-06-07

## 2017-04-12 RX ORDER — CYCLOBENZAPRINE HCL 10 MG
10 TABLET ORAL 3 TIMES DAILY PRN
COMMUNITY

## 2017-04-12 RX ORDER — PANTOPRAZOLE SODIUM 40 MG/1
40 TABLET, DELAYED RELEASE ORAL
Status: DISCONTINUED | OUTPATIENT
Start: 2017-04-13 | End: 2017-04-17 | Stop reason: HOSPADM

## 2017-04-12 RX ORDER — ATORVASTATIN CALCIUM 80 MG/1
80 TABLET, FILM COATED ORAL NIGHTLY
Status: ON HOLD | COMMUNITY
End: 2017-04-17 | Stop reason: HOSPADM

## 2017-04-12 RX ORDER — CYCLOBENZAPRINE HCL 10 MG
10 TABLET ORAL 3 TIMES DAILY PRN
Status: DISCONTINUED | OUTPATIENT
Start: 2017-04-12 | End: 2017-04-17 | Stop reason: HOSPADM

## 2017-04-12 RX ORDER — BISACODYL 10 MG
10 SUPPOSITORY, RECTAL RECTAL DAILY PRN
Status: DISCONTINUED | OUTPATIENT
Start: 2017-04-12 | End: 2017-04-17 | Stop reason: HOSPADM

## 2017-04-12 RX ORDER — NORTRIPTYLINE HYDROCHLORIDE 10 MG/1
40 CAPSULE ORAL NIGHTLY
Status: DISCONTINUED | OUTPATIENT
Start: 2017-04-12 | End: 2017-04-17 | Stop reason: HOSPADM

## 2017-04-12 RX ORDER — ACETAMINOPHEN 325 MG/1
650 TABLET ORAL EVERY 4 HOURS PRN
Status: DISCONTINUED | OUTPATIENT
Start: 2017-04-12 | End: 2017-04-17 | Stop reason: HOSPADM

## 2017-04-12 RX ORDER — LOSARTAN POTASSIUM 25 MG/1
25 TABLET ORAL DAILY
Status: DISCONTINUED | OUTPATIENT
Start: 2017-04-12 | End: 2017-04-17 | Stop reason: HOSPADM

## 2017-04-12 RX ORDER — ASPIRIN 325 MG
325 TABLET ORAL DAILY
Status: ON HOLD | COMMUNITY
End: 2017-04-17 | Stop reason: HOSPADM

## 2017-04-12 RX ORDER — ATORVASTATIN CALCIUM 80 MG/1
80 TABLET, FILM COATED ORAL NIGHTLY
Status: DISCONTINUED | OUTPATIENT
Start: 2017-04-12 | End: 2017-04-17 | Stop reason: HOSPADM

## 2017-04-12 RX ORDER — MONTELUKAST SODIUM 10 MG/1
10 TABLET ORAL NIGHTLY
Status: DISCONTINUED | OUTPATIENT
Start: 2017-04-12 | End: 2017-04-17 | Stop reason: HOSPADM

## 2017-04-12 RX ORDER — ASPIRIN 325 MG
325 TABLET ORAL DAILY
Status: DISCONTINUED | OUTPATIENT
Start: 2017-04-12 | End: 2017-04-17 | Stop reason: HOSPADM

## 2017-04-12 RX ORDER — ASPIRIN 325 MG
325 TABLET ORAL DAILY
Qty: 30 TABLET | Refills: 1 | Status: SHIPPED | OUTPATIENT
Start: 2017-04-12 | End: 2017-05-19

## 2017-04-12 RX ORDER — OMEPRAZOLE 20 MG/1
20 CAPSULE, DELAYED RELEASE ORAL DAILY
COMMUNITY

## 2017-04-12 RX ORDER — DOCUSATE SODIUM 100 MG/1
100 CAPSULE, LIQUID FILLED ORAL 2 TIMES DAILY PRN
Status: DISCONTINUED | OUTPATIENT
Start: 2017-04-12 | End: 2017-04-17 | Stop reason: HOSPADM

## 2017-04-12 RX ORDER — NAPROXEN 500 MG/1
500 TABLET ORAL 2 TIMES DAILY PRN
COMMUNITY

## 2017-04-12 RX ORDER — NAPROXEN 500 MG/1
500 TABLET ORAL 2 TIMES DAILY PRN
Status: DISCONTINUED | OUTPATIENT
Start: 2017-04-12 | End: 2017-04-17 | Stop reason: HOSPADM

## 2017-04-12 RX ADMIN — ASPIRIN 325 MG: 325 TABLET, COATED ORAL at 08:23

## 2017-04-12 RX ADMIN — BUDESONIDE 0.25 MG: 0.25 INHALANT RESPIRATORY (INHALATION) at 06:41

## 2017-04-12 RX ADMIN — ENOXAPARIN SODIUM 40 MG: 40 INJECTION SUBCUTANEOUS at 19:51

## 2017-04-12 RX ADMIN — ROPINIROLE HYDROCHLORIDE 40 MG: 1 TABLET, FILM COATED ORAL at 19:51

## 2017-04-12 RX ADMIN — BECLOMETHASONE DIPROPIONATE 2 PUFF: 80 AEROSOL, METERED RESPIRATORY (INHALATION) at 19:50

## 2017-04-12 RX ADMIN — ATORVASTATIN CALCIUM 80 MG: 80 TABLET, FILM COATED ORAL at 19:51

## 2017-04-12 RX ADMIN — MONTELUKAST SODIUM 10 MG: 10 TABLET, FILM COATED ORAL at 19:51

## 2017-04-12 RX ADMIN — LISINOPRIL 10 MG: 10 TABLET ORAL at 08:23

## 2017-04-12 NOTE — PROGRESS NOTES
Continued Stay Note   [place]     Patient Name: Donya Capellan  MRN: [id number]  Today's Date: 4/12/2017    Admit Date: 4/9/2017          Discharge Plan       04/12/17 1124    Case Management/Social Work Plan    Plan [facility] Today    Final Note    Final Note Patient is accepted at [facility] for admission today. Discharge summary has been faxed to [facility] at [phone]. Patient's nurse will call report to [phone]. Patient to transport by private car.              Discharge Codes       04/12/17 1124    Discharge Codes    Discharge Codes 90  R- To rehab facility        Expected Discharge Date and Time     Expected Discharge Date Expected Discharge Time    Apr 12, 2017             RAMYA Prince

## 2017-04-12 NOTE — PLAN OF CARE
Problem: Patient Care Overview (Adult)  Goal: Plan of Care Review  Outcome: Ongoing (interventions implemented as appropriate)    04/12/17 1022   Outcome Evaluation   Outcome Summary/Follow up Plan Pt issued FM coordination HEP, red tubing and stress ball to increase  strength and coordination of R hand. Pt requires verbal cues and encouragement to incorporate R hand into tasks. Pt completed LB dressing with min A, bed mobility with S and transfers with SBA. Pt discharging to [facility].    Patient Care Overview   Progress progress towards functional goals is fair   Coping/Psychosocial Response Interventions   Plan Of Care Reviewed With patient

## 2017-04-12 NOTE — PLAN OF CARE
Problem: Patient Care Overview (Adult)  Goal: Plan of Care Review    04/12/17 0406   Outcome Evaluation   Outcome Summary/Follow up Plan pt in bed most of shift.c/o tingling/numbness to right arm/leg/rt side of face-that she states is getting better. pt ia alert able to make needs known, yet very anxious at times about things that are going on at home. prn for anxiety given x 1.   Patient Care Overview   Progress improving   Coping/Psychosocial Response Interventions   Plan Of Care Reviewed With patient         Problem: Stroke (Ischemic) (Adult)  Goal: Signs and Symptoms of Listed Potential Problems Will be Absent or Manageable (Stroke)  Outcome: Ongoing (interventions implemented as appropriate)    Problem: Fall Risk (Adult)  Goal: Identify Related Risk Factors and Signs and Symptoms  Outcome: Ongoing (interventions implemented as appropriate)  Goal: Absence of Falls  Outcome: Ongoing (interventions implemented as appropriate)

## 2017-04-12 NOTE — PLAN OF CARE
Problem: Patient Care Overview (Adult)  Goal: Plan of Care Review  Outcome: Ongoing (interventions implemented as appropriate)    04/11/17 2005   Outcome Evaluation   Outcome Summary/Follow up Plan patient transfered from the unit to the floor today. She was admitted fro stroke effecting the right side, TPA was strted on admission but stopped due to abdominal pain, CT aof abdomen neagative, upn x1 to the bathroom, VSS, voices anxiety over family situation with her grandaughter, prn anxiety med available   Patient Care Overview   Progress progress toward functional goals as expected   Coping/Psychosocial Response Interventions   Plan Of Care Reviewed With patient       Goal: Adult Individualization and Mutuality  Outcome: Ongoing (interventions implemented as appropriate)    Problem: Stroke (Ischemic) (Adult)  Goal: Signs and Symptoms of Listed Potential Problems Will be Absent or Manageable (Stroke)  Outcome: Ongoing (interventions implemented as appropriate)    Problem: Fall Risk (Adult)  Goal: Absence of Falls  Outcome: Ongoing (interventions implemented as appropriate)

## 2017-04-12 NOTE — DISCHARGE SUMMARY
Date of Admission: 4/9/2017  Date of Discharge:  4/12/2017    Admitting Diagnosis: Right sided numbness and weakness  Discharge Diagnosis:   1.  Left basal ganglia acute ischemic stroke secondary to thrombosis and s/p tPA administration  2.  Hypertension  3.  Hyperlipidemia  4.  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  Procedure                               Abnormality         Status                     ---------                               -----------         ------                     CBC Auto Differential[[id number]]         Abnormal            Final result                 Please view results for these tests on the individual orders.    CBC Auto Differential [[id number]]  (Abnormal) Collected:  04/09/17 1351    Specimen:  Blood Updated:  04/09/17 1443     WBC 11.96 (H) 10*3/mm3      RBC 5.04 10*6/mm3      Hemoglobin 15.3 g/dL      Hematocrit 43.6 %      MCV 86.5 fL      MCH 30.4 pg      MCHC 35.1 g/dL      RDW 12.7 %      RDW-SD 40.3 fl      MPV 9.5 fL      Platelets 354 10*3/mm3      Neutrophil % 61.3 %      Lymphocyte % 30.8 %      Monocyte % 6.5 %      Eosinophil % 0.8 %      Basophil % 0.3 %      Immature Grans % 0.3 %      Neutrophils, Absolute 7.35 10*3/mm3      Lymphocytes, Absolute 3.68 10*3/mm3      Monocytes, Absolute 0.78 10*3/mm3      Eosinophils, Absolute 0.09 10*3/mm3      Basophils, Absolute 0.03 10*3/mm3      Immature Grans, Absolute 0.03 10*3/mm3     Protime-INR [[id number]]   (Normal) Collected:  04/09/17 1351    Specimen:  Blood Updated:  04/09/17 1448     Protime 12.5 Seconds      INR 0.91    aPTT [[id number]]  (Normal) Collected:  04/09/17 1351    Specimen:  Blood Updated:  04/09/17 1448     PTT 28.4 seconds     Comprehensive Metabolic Panel [[id number]]  (Abnormal) Collected:  04/09/17 1351    Specimen:  Blood Updated:  04/09/17 1450     Glucose 96 mg/dL      BUN 23 (H) mg/dL      Creatinine 1.03 mg/dL      Sodium 141 mmol/L      Potassium 4.0 mmol/L      Chloride 99 mmol/L      CO2 26.0 mmol/L      Calcium 9.5 mg/dL      Total Protein 7.5 g/dL      Albumin 4.30 g/dL      ALT (SGPT) 21 U/L      AST (SGOT) 33 U/L      Alkaline Phosphatase 93 U/L      Total Bilirubin 0.6 mg/dL      eGFR Non African Amer 53 (L) mL/min/1.73      Globulin 3.2 gm/dL      A/G Ratio 1.3 g/dL      BUN/Creatinine Ratio 22.3     Anion Gap 16.0 (H) mmol/L     POC Glucose Fingerstick [[id number]]  (Normal) Collected:  04/09/17 1438    Specimen:  Blood Updated:  04/09/17 1459     Glucose 88 mg/dL       : [id number] Sid Flanagan LMeter ID: [id number]       Troponin [[id number]]  (Normal) Collected:  04/09/17 1351    Specimen:  Blood Updated:  04/09/17 1501     Troponin I <0.012 ng/mL     [place] Draw [[id number]] Collected:  04/09/17 1351    Specimen:  Blood Updated:  04/09/17 1801    Narrative:       The following orders were created for panel order [place] Draw.  Procedure                               Abnormality         Status                     ---------                               -----------         ------                     Light Blue Top[[id number]]                                    Final result               Green Top (Gel)[[id number]]                                   Final result               Lavender Top[[id number]]                                      Final result               Red Top[[id number]]                                           Final result               Green Top (No Gel)[[id number]]                                                              Please view results for these tests on the individual orders.    Light Blue Top [[id number]] Collected:  04/09/17 1351    Specimen:  Blood Updated:  04/09/17 1801     Extra Tube hold for add-on      Auto resulted       Green Top (Gel) [[id number]] Collected:  04/09/17 1351    Specimen:  Blood Updated:  04/09/17 1801     Extra Tube Hold for add-ons.      Auto resulted.       Lavender Top [[id number]] Collected:  04/09/17 1351    Specimen:  Blood Updated:  04/09/17 1801     Extra Tube hold for add-on      Auto resulted       Red Top [[id number]] Collected:  04/09/17 1351    Specimen:  Blood Updated:  04/09/17 1801     Extra Tube Hold for add-ons.      Auto resulted.       POC Glucose Fingerstick [[id number]]  (Normal) Collected:  04/09/17 1752    Specimen:  Blood Updated:  04/09/17 1809     Glucose 127 mg/dL       : [id number] Mary Jane PamelaMeter ID: [id number]       POC Glucose Fingerstick [[id number]]  (Abnormal) Collected:  04/10/17 1745    Specimen:  Blood Updated:  04/10/17 1757     Glucose 195 (H) mg/dL       : [id number] Kyler Duran SMeter ID: [id number]       Hemoglobin A1c [[id number]] Collected:  04/10/17 1838    Specimen:  Blood Updated:  04/1950     Hemoglobin A1C 5.4 %     Narrative:       Less than 6.0           Non-Diabetic Range  6.0-7.0                 ADA Therapeutic Target  Greater than 7.0        Action Suggested    Lipid Panel [[id number]] Collected:  04/10/17 1838    Specimen:  Blood Updated:  04/10/17 1949     Total Cholesterol 169 mg/dL      Triglycerides 140 mg/dL      HDL Cholesterol 55 mg/dL      LDL Cholesterol  81 mg/dL      LDL/HDL Ratio 1.56    POC Glucose Fingerstick [[id number]]  (Normal) Collected:  04/10/17 2330    Specimen:  Blood Updated:  04/10/17 2346     Glucose 103 mg/dL       : [id number] Nj  KMeter ID: [id number]       POC Glucose Fingerstick [[id number]]  (Normal) Collected:  04/11/17 0531    Specimen:  Blood  Updated:  04/11/17 0542     Glucose 90 mg/dL       : [id number] Nj  KMeter ID: [id number]       POC Glucose Fingerstick [[id number]]  (Normal) Collected:  04/11/17 1114    Specimen:  Blood Updated:  04/11/17 1125     Glucose 115 mg/dL       : [id number] Kyler Duran SMeter ID: [id number]           Imaging Results (last 7 days)     Procedure Component Value Units Date/Time    CT Head Without Contrast Stroke Protocol [[id number]] Collected:  04/09/17 1435     Updated:  04/09/17 1442    Narrative:       CT BRAIN without contrast 4/9/2017 2:24 PM CDT     HISTORY: Right-sided numbness and tingling. Acute stroke protocol.     COMPARISON: 10/02/2015      DLP: 527 mGy cm. Automated exposure control was utilized to diminish  patient radiation dose.     TECHNIQUE: Serial axial tomographic images of the brain were obtained  without the use of intravenous contrast.      FINDINGS:   The midline structures are nondisplaced. There is mild cerebral and  cerebellar volume loss, with an associated increase in the prominence of  the ventricles and sulci. The basilar cisterns are normal in size and  configuration. There is no evidence of intracranial hemorrhage or  mass-effect. There is low attenuation in the periventricular white  matter, consistent with chronic ischemic change. There are no abnormal  extra-axial fluid collections. There is no evidence of tonsillar  herniation.      The included orbits and their contents are unremarkable. The visualized  paranasal sinuses, mastoid air cells and middle ear cavities are clear.  The visualized osseous structures and overlying soft tissues of the  skull and face are intact.        Impression:          1. Mild cerebral and cerebellar volume loss with chronic microvascular  disease but no evidence of acute intracranial process.  2. Code stroke report was phoned to Rae Gutierrez in the emergency  room at 2:35 PM.        This report was finalized on 04/09/2017 14:39 by   Jim Escobar MD.    XR Chest 1 View [[id number]] Collected:  04/09/17 1451     Updated:  04/09/17 1454    Narrative:       Comparison: 09/28/2015     HISTORY:  Acute stroke protocol.     One-view chest: Upright frontal projection of the chest is obtained. The  lungs are clear with no evidence of acute parenchymal  consolidation. No  pleural effusion or free air is observed. The  mediastinal contours are  within normal limits.                                                                                                                         Impression:       Impression: No evidence of acute cardiopulmonary disease.  This report was finalized on 04/09/2017 14:51 by Dr. Jim Escobar MD.    CT Lumbar Spine With Contrast [[id number]] Collected:  04/09/17 1714     Updated:  04/09/17 1722    Narrative:       EXAMINATION:  CT LUMBAR SPINE W CONTRAST-  4/9/2017 4:24 PM CDT     HISTORY: The patient had TPA. The patient now has paresthesias. There is  concern for epidural hemorrhage.     TECHNIQUE: Spiral CT was performed of the lumbar spine with contrast.  Sagittal and coronal images were reconstructed.     DLP: 327 mGy-cm. Automated dosage control was utilized.     COMPARISON: No comparison study.     FINDINGS: At T12-L1, the disc is well-maintained. There is no spinal or  foraminal narrowing.     At L1-L2, there is a Schmorl's node in the superior endplate of L2.  There is mild disc bulging. There is no spinal or foraminal stenosis.     At L2-3, there is mild to moderate disc bulging producing mild dural sac  compression. There is mild inferior recess foraminal narrowing due to  disc bulging. There is minimal central spinal canal narrowing.     At L3-4, there is vacuum disc phenomenon and moderate to severe disc  narrowing. There is mild to moderate disc bulging. There is mild facet  arthropathy. These factors produce mild central spinal canal narrowing.  There is mild inferior recess foraminal narrowing  bilaterally left  greater than right at this level.     At L4-5, there is disc narrowing and vacuum disc phenomenon. There is  mild to moderate disc bulging. There is mild facet arthropathy. These  factors produce mild central spinal canal narrowing. There is mild  inferior recess foraminal narrowing bilaterally at this level. There is  very slight posterior subluxation of L4 compared to L5.     At L5-S1, there is vacuum disc phenomena. There is mild to moderate disc  bulging. There are bilateral pars defects at L5 with minimal anterior  subluxation of L5 compared to S1. There is no significant central spinal  canal stenosis. There is mild foraminal narrowing bilaterally at this  level.     There are no areas of abnormal contrast enhancement in the lumbar spine  region. There is no obvious mass effect due to hemorrhage identified  within the lumbar spinal canal.       Impression:       Abnormalities are noted throughout the lumbar spine. Please  see the above report.     The full report of this exam was immediately signed and available to the  emergency room. The patient is currently in the emergency room.  This report was finalized on 04/09/2017 17:19 by Dr. Buck Austin MD.    CT Thoracic Spine With Contrast [[id number]] Collected:  04/09/17 1722     Updated:  04/09/17 1730    Narrative:       EXAMINATION:  CT THORACIC SPINE W CONTRAST-  4/9/2017 4:24 PM CDT     HISTORY: The patient was treated with T PA and now has paresthesia.     COMPARISON: No comparison study.      TECHNIQUE: Spiral CT was performed of the thoracic spine with contrast.  Sagittal and coronal images were reconstructed.     DLP: 842 mGy-cm. Automated dosage control was utilized.     FINDINGS: There is no evidence of thoracic spine fracture or  subluxation. There is no evidence of any significant narrowing of the  thoracic spinal canal or foramina. There is minimal disc degeneration at  some levels. There is no abnormal enhancement identified  within the  spinal canal. I do not see any mass effect in the spinal canal.       Impression:       1. No acute findings.  2. Mild disc degeneration at a few levels. No significant spinal or  foraminal stenosis.     The full report of this exam was immediately signed and available to the  emergency room. The patient is currently in the emergency room.     This report was finalized on 04/09/2017 17:27 by Dr. Buck Austin MD.    CT Head Without Contrast [[id number]] Collected:  04/09/17 2152     Updated:  04/09/17 2158    Narrative:       EXAMINATION:  CT HEAD WO CONTRAST-  4/9/2017 9:35 PM CDT     HISTORY: Neurologic changes. The patient had TPA therapy.     TECHNIQUE: Multiple axial images were obtained through the brain without  contrast infusion. Multiplanar images were reconstructed.     DLP: 548 mGy-cm. Automated dosage control was utilized.     COMPARISON: 04/09/2017.     FINDINGS: There are no hemorrhage, edema or mass effect. There is a  dilated perivascular space in the right basal ganglia versus a chronic  lacunar infarct. There is still some vascular enhancement related to an  earlier CT study with contrast. The ventricular system is nondilated.  The visualized paranasal sinuses and mastoid air cells are clear.       Impression:       1. No hemorrhage, edema or mass effect. No acute findings.  2. Chronic lacunar infarct versus dilated perivascular space in the  right basal ganglia region.  3. There is some vascular enhancement related to CT with contrast  performed earlier today.        This report was finalized on 04/09/2017 21:55 by Dr. Buck Austin MD.    CT Angiogram Abdomen Pelvis With & Without Contrast [[id number]] Collected:  04/09/17 1711     Updated:  04/10/17 0854    Narrative:       CT ANGIOGRAM ABDOMEN PELVIS W WO CONTRAST-     HISTORY: Evaluate for dissection status post tPA.     DOSE: 842 mGycm. Automated exposure control was utilized to diminish  patient radiation dose.     FINDINGS:  Multiple contiguous axial images are obtained through the  abdomen and pelvis at 2 mm intervals following intravenous contrast  administration with reformatted images obtained in the sagittal and  coronal projections from the original data set.     There is mild dependent atelectasis within both posterior lung bases.  There is no evidence of pericardial effusion. Small hiatal hernia is  present. There is a 3.1 cm hepatic cyst within the lateral segment of  the left lobe of the liver as well as some other smaller cysts. The  patient has undergone prior cholecystectomy. There is mild extrahepatic  biliary dilatation likely representing a reservoir effect. There is no  evidence of splenomegaly.     There is a 1.5 cm solid appearing nodule. It is in close proximity to  the gastric fundus and could potentially represent a diverticulum but I  would favor that it is a pedunculated lesion emanating from the  pancreatic gland. This study needs to be repeated with oral contrast.  Ultrasound could also potentially be helpful to further evaluate this  finding. The pancreatic gland is otherwise unremarkable. There is  homogeneous enhancement of the kidneys. There is no evidence of  nephrolithiasis or obstructive uropathy. A small fat-containing  periumbilical hernia is present. There are a few diverticula noted of  the sigmoid colon without evidence of acute diverticulitis. There is a  suspected small fibroid of the uterine fundus measuring 6 mm in size. No  free fluid is present.     No acute bony abnormalities are present.     The abdominal aorta is normal in caliber with no evidence of dissection  or aneurysm. There is some mild plaquing. There is no evidence of an  aneurysm. There is minimal plaquing at the origin of the left renal  artery without evidence of a rate-limiting stenosis. Examination of the  mesenteric vessels demonstrate moderate stenosis of the proximal segment  of the superior mesenteric artery. The celiac  and NEAL are patent.       Impression:       1. No evidence of aortic dissection or aneurysm. There is moderate  stenosis involving the proximal segment of the SMA.  2. There is a 1.5 cm hypodense left upper quadrant nodule. This could  potentially represent a diverticulum off the gastric fundus but appears  to be in close proximity to the tail/distal body of the pancreas.  Follow-up with either ultrasound or repeat CT imaging with oral contrast  using the pancreatic protocol is recommended.   3. Hepatic cyst of the liver. There is mild extrahepatic biliary  dilatation likely related to prior cholecystectomy and reservoir effect.     4. No evidence of obstruction or free air. There are a few diverticula  noted of the sigmoid colon without evidence of acute diverticulitis.   5. 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  This report was finalized on 04/10/2017 08:51 by Dr. Jim Escobar MD.    CT Head Without Contrast [[id number]] Collected:  04/10/17 1648     Updated:  04/10/17 1652    Narrative:       CT HEAD WO CONTRAST- 4/10/2017 15:22 CST     HISTORY: Stroke post tPA administration; I63.312-Cerebral infarction due  to thrombosis of left middle cerebral artery; R13.10-Dysphagia,  unspecified       COMPARISON: 04/09/2017      DOSE LENGTH PRODUCT: 616 mGy cm. Automated exposure control was also  utilized to decrease patient radiation dose.     TECHNIQUE: Helical tomographic images of the brain were obtained without  the use of intravenous contrast.      FINDINGS:   Hemorrhage: None.     Mass effect: No midline shift or mass effect.     Ventricles: Normal and symmetric without hydrocephalus.     Basilar cisterns: Normal.     Sulci: Normal in configuration. No sulcal effacement.     Extra-axial fluid: No abnormal extra-axial fluid collections.     Grey and white matter differentiation: Normal. A round area of low  attenuation in the RIGHT basal ganglia could be due to a  dilated  Virchow-Christopher space or prior lacunar infarct.     Posterior fossa and brainstem: Normal.     Bones: No fractures or lesions.     Soft tissues: No acute process.     Sinuses and mastoid air cells: Clear.       Impression:       1. No evidence of acute hemorrhage or significant interval change after  TPA administration.        This report was finalized on 04/10/2017 16:49 by Dr. Jim Allen MD.    MRI Brain Without Contrast [[id number]] Collected:  04/10/17 1650     Updated:  04/10/17 1655    Narrative:       MRI BRAIN WO CONTRAST- 4/10/2017 15:20 CST     HISTORY: stroke; I63.312-Cerebral infarction due to thrombosis of left  middle cerebral artery; R13.10-Dysphagia, unspecified     COMPARISON: CT 04/09/2017       TECHNIQUE: Multiplanar imaging of the brain was performed in a routine  fashion without contrast.     FINDINGS:   Diffusion: There is a small focus of restricted diffusion within the  LEFT basal ganglia. This is consistent with an acute lacunar infarct.     Midline structures: Nondisplaced.     Ventricles: Normal in configuration and symmetric in size.     Masses: No masses or mass effect.     Basilar cisterns: Maintained.     Extra axial space: No abnormal extra-axial fluid.     Gray-white matter signal: A few areas of increased T2/FLAIR signal are  seen in the periventricular and subcortical white matter. These are most  likely due to chronic microvascular ischemia. A dilated Virchow-Christopher  space is seen in the RIGHT basal ganglia.     Cerebellum: Normal.     Brainstem: Normal.     Other: Proximal cervical spinal cord is normal. Bilateral globes and  orbits are normal in appearance. Normal cerebrovascular flow voids  noted. No abnormal signal in the mastoid air cells or paranasal sinuses.       Impression:       1. Acute lacunar infarct in the LEFT basal ganglia.   This report was finalized on 04/10/2017 16:52 by Dr. Jim Allen MD.    US Carotid Bilateral [[id number]] Collected:  04/10/17 1917      Updated:  04/10/17 1920    Narrative:       History: Stroke       Impression:       Impression:  1. There is less than 50% stenosis of the right internal carotid artery.  2. There is less than 50% stenosis of the left internal carotid artery.  3. Antegrade flow is demonstrated in bilateral vertebral arteries.     Comments: Bilateral carotid vertebral arterial duplex scan was  performed.     Grayscale imaging shows intimal thickening and calcified elements at the  carotid bifurcation. The right internal carotid artery peak systolic  velocity is 96.4 cm/sec. The end-diastolic velocity is 40.2 cm/sec. The  right ICA/CCA ratio is approximately 1.15 . These findings correlate  with less than 50% stenosis of the right internal carotid artery.     Grayscale imaging shows intimal thickening and calcified elements at the  carotid bifurcation. The left internal carotid artery peak systolic  velocity is 107 cm/sec. The end-diastolic velocity is 45.1 cm/sec. The  left ICA/CCA ratio is approximately 1.2 . These findings correlate with  less than 50% stenosis of the left internal carotid artery.     Antegrade flow is demonstrated in bilateral vertebral arteries.       This report was finalized on 04/10/2017 19:17 by Dr. Florian Philip MD.            Hospital Course  Patient is a 66 y.o. female presented with an acute onset of right-sided numbness, paresthesias, and weakness at 11:30 AM April 19,017.  She had no visual issues nor speech changes.  She was found to have elevated blood pressure on initial presentation.  She met the criteria for intravenous TPA did receive this.  She was monitored in the intensive care unit for 24 hours under tPA precautions.  She went an MR of the brain showing a left lacunar stroke in the basal ganglia.  Ultrasonography showed mild to moderate stenosis bilaterally but no significant stenoses.  A transthoracic echocardiogram showed no embolic source.  She was found to have a mildly elevated  cholesterol level of 169.  LDL specifically was 81.  Hemoglobin A1c was normal at 5.4.  She underwent physical occupational speech therapy.  She was deemed a candidate for acute rehabilitation.  She will be discharged today to [facility] acute rehabilitation.  She will have follow-up in approximate 4-6 weeks with the neurology office.    Neurologic exam at discharge is that her cranial nerves are normal.  Her right upper extremity has 4 out of 5 strength.  Right lower extremity has 5 minus out of 5 strength.  She is able to ambulate.  She has numbness and paresthesias subjectively in the right arm greater than right leg.  She has no speech abnormalities.    Full dose aspirin and high-dose Lipitor has been added to her medication profile.  Her systolic blood pressure goals in outpatient will be less than 140.  LDL goal be less than 70.      Discharge Disposition  Rehab Facility or Unit (DC - External)    Discharge Medications   Donya Capellan   Home Medication Instructions SRINIVASA:[id number]    Printed on:04/12/17 6777   Medication Information                      ASMANEX 30 METERED DOSES 110 MCG/INH inhaler  Inhale 2 puffs 2 (Two) Times a Day.             aspirin 325 MG tablet  Take 1 tablet by mouth Daily.             atorvastatin (LIPITOR) 80 MG tablet  Take 1 tablet by mouth Every Night.             cyclobenzaprine (FLEXERIL) 10 MG tablet  Take 10 mg by mouth Every 8 (Eight) Hours As Needed for Muscle Spasms.             losartan (COZAAR) 25 MG tablet  Take 25 mg by mouth Daily.             montelukast (SINGULAIR) 10 MG tablet  Take 10 mg by mouth Every Night.             naproxen (NAPROSYN) 500 MG tablet  Take 500 mg by mouth 2 (Two) Times a Day As Needed for Mild Pain (1-3).             nortriptyline (PAMELOR) 10 MG capsule  Take 40 mg by mouth Every Night.             omeprazole (priLOSEC) 20 MG capsule  Take 20 mg by mouth Daily.                 Discharge Diet:  as tolerated    Activity at Discharge:  per acute  rehabilitation facility    Follow-up Appointments  Follow-up with primary care physician 7-10 days after being discharged from rehabilitation  Follow-up with neurology in 4-6 weeks    Test Results Pending at Discharge  none     Ty Mcdaniel MD  04/12/17  10:57 AM    Time: Discharge 40 min

## 2017-04-13 PROBLEM — J45.20 MILD INTERMITTENT ASTHMA WITHOUT COMPLICATION: Status: ACTIVE | Noted: 2017-04-13

## 2017-04-13 LAB
ALBUMIN SERPL-MCNC: 4 G/DL (ref 3.5–5.2)
ALP BLD-CCNC: 76 U/L (ref 35–104)
ALT SERPL-CCNC: 13 U/L (ref 5–33)
ANION GAP SERPL CALCULATED.3IONS-SCNC: 15 MMOL/L (ref 7–19)
AST SERPL-CCNC: 17 U/L (ref 5–32)
BASOPHILS ABSOLUTE: 0 K/UL (ref 0–0.2)
BASOPHILS RELATIVE PERCENT: 0.2 % (ref 0–1)
BILIRUB SERPL-MCNC: 0.5 MG/DL (ref 0.2–1.2)
BUN BLDV-MCNC: 19 MG/DL (ref 8–23)
CALCIUM SERPL-MCNC: 9 MG/DL (ref 8.8–10.2)
CHLORIDE BLD-SCNC: 100 MMOL/L (ref 98–111)
CO2: 25 MMOL/L (ref 22–29)
CREAT SERPL-MCNC: 0.8 MG/DL (ref 0.5–0.9)
EOSINOPHILS ABSOLUTE: 0.2 K/UL (ref 0–0.6)
EOSINOPHILS RELATIVE PERCENT: 1.7 % (ref 0–5)
GFR NON-AFRICAN AMERICAN: >60
GLOBULIN: 2.8 G/DL
GLUCOSE BLD-MCNC: 93 MG/DL (ref 74–109)
HCT VFR BLD CALC: 47.8 % (ref 37–47)
HEMOGLOBIN: 15.7 G/DL (ref 12–16)
LYMPHOCYTES ABSOLUTE: 3 K/UL (ref 1.1–4.5)
LYMPHOCYTES RELATIVE PERCENT: 30.5 % (ref 20–40)
MCH RBC QN AUTO: 29.7 PG (ref 27–31)
MCHC RBC AUTO-ENTMCNC: 32.8 G/DL (ref 33–37)
MCV RBC AUTO: 90.5 FL (ref 81–99)
MONOCYTES ABSOLUTE: 0.6 K/UL (ref 0–0.9)
MONOCYTES RELATIVE PERCENT: 5.8 % (ref 0–10)
NEUTROPHILS ABSOLUTE: 6 K/UL (ref 1.5–7.5)
NEUTROPHILS RELATIVE PERCENT: 61.7 % (ref 50–65)
PDW BLD-RTO: 12.8 % (ref 11.5–14.5)
PLATELET # BLD: 336 K/UL (ref 130–400)
PMV BLD AUTO: 9.2 FL (ref 7.4–10.4)
POTASSIUM SERPL-SCNC: 4.1 MMOL/L (ref 3.5–5)
RBC # BLD: 5.28 M/UL (ref 4.2–5.4)
SODIUM BLD-SCNC: 140 MMOL/L (ref 136–145)
TOTAL PROTEIN: 6.8 G/DL (ref 6.6–8.7)
WBC # BLD: 9.7 K/UL (ref 4.8–10.8)

## 2017-04-13 PROCEDURE — 80053 COMPREHEN METABOLIC PANEL: CPT

## 2017-04-13 PROCEDURE — 97535 SELF CARE MNGMENT TRAINING: CPT

## 2017-04-13 PROCEDURE — 92522 EVALUATE SPEECH PRODUCTION: CPT

## 2017-04-13 PROCEDURE — 97110 THERAPEUTIC EXERCISES: CPT

## 2017-04-13 PROCEDURE — 6360000002 HC RX W HCPCS: Performed by: PSYCHIATRY & NEUROLOGY

## 2017-04-13 PROCEDURE — 97165 OT EVAL LOW COMPLEX 30 MIN: CPT

## 2017-04-13 PROCEDURE — 97161 PT EVAL LOW COMPLEX 20 MIN: CPT

## 2017-04-13 PROCEDURE — 36415 COLL VENOUS BLD VENIPUNCTURE: CPT

## 2017-04-13 PROCEDURE — 97116 GAIT TRAINING THERAPY: CPT

## 2017-04-13 PROCEDURE — 1180000000 HC REHAB R&B

## 2017-04-13 PROCEDURE — 99223 1ST HOSP IP/OBS HIGH 75: CPT | Performed by: PSYCHIATRY & NEUROLOGY

## 2017-04-13 PROCEDURE — 92526 ORAL FUNCTION THERAPY: CPT

## 2017-04-13 PROCEDURE — 6370000000 HC RX 637 (ALT 250 FOR IP): Performed by: PSYCHIATRY & NEUROLOGY

## 2017-04-13 PROCEDURE — 85025 COMPLETE CBC W/AUTO DIFF WBC: CPT

## 2017-04-13 RX ORDER — CIPROFLOXACIN 500 MG/1
500 TABLET, FILM COATED ORAL EVERY 12 HOURS SCHEDULED
Status: DISCONTINUED | OUTPATIENT
Start: 2017-04-13 | End: 2017-04-17 | Stop reason: HOSPADM

## 2017-04-13 RX ADMIN — MONTELUKAST SODIUM 10 MG: 10 TABLET, FILM COATED ORAL at 20:04

## 2017-04-13 RX ADMIN — ROPINIROLE HYDROCHLORIDE 40 MG: 1 TABLET, FILM COATED ORAL at 20:04

## 2017-04-13 RX ADMIN — LOSARTAN POTASSIUM 25 MG: 25 TABLET, FILM COATED ORAL at 08:30

## 2017-04-13 RX ADMIN — ATORVASTATIN CALCIUM 80 MG: 80 TABLET, FILM COATED ORAL at 20:04

## 2017-04-13 RX ADMIN — NAPROXEN 500 MG: 500 TABLET ORAL at 03:05

## 2017-04-13 RX ADMIN — CIPROFLOXACIN HYDROCHLORIDE 500 MG: 500 TABLET, FILM COATED ORAL at 20:04

## 2017-04-13 RX ADMIN — PANTOPRAZOLE SODIUM 40 MG: 40 TABLET, DELAYED RELEASE ORAL at 05:56

## 2017-04-13 RX ADMIN — ENOXAPARIN SODIUM 40 MG: 40 INJECTION SUBCUTANEOUS at 17:37

## 2017-04-13 RX ADMIN — BECLOMETHASONE DIPROPIONATE 2 PUFF: 80 AEROSOL, METERED RESPIRATORY (INHALATION) at 08:30

## 2017-04-13 RX ADMIN — ASPIRIN 325 MG ORAL TABLET 325 MG: 325 PILL ORAL at 08:30

## 2017-04-13 RX ADMIN — BECLOMETHASONE DIPROPIONATE 2 PUFF: 80 AEROSOL, METERED RESPIRATORY (INHALATION) at 20:04

## 2017-04-13 RX ADMIN — CIPROFLOXACIN HYDROCHLORIDE 500 MG: 500 TABLET, FILM COATED ORAL at 08:30

## 2017-04-13 RX ADMIN — NAPROXEN 500 MG: 500 TABLET ORAL at 23:50

## 2017-04-13 ASSESSMENT — PAIN SCALES - GENERAL
PAINLEVEL_OUTOF10: 6
PAINLEVEL_OUTOF10: 10

## 2017-04-13 ASSESSMENT — 9 HOLE PEG TEST
TEST_RESULT: IMPAIRED
TESTTIME_SECONDS: 21.7
TESTTIME_SECONDS: 23.39
TEST_RESULT: FUNCTIONAL

## 2017-04-13 NOTE — THERAPY DISCHARGE NOTE
Acute Care - Physical Therapy Discharge Summary  [facility]       Patient Name: Donya Capellan  : 1950  MRN: [id number]    Today's Date: 2017  Onset of Illness/Injury or Date of Surgery Date: 17    Date of Referral to PT: 17  Referring Physician: Dr. Singletary      Admit Date: 2017      PT Recommendation and Plan    Visit Dx:    ICD-10-CM ICD-9-CM   1. Cerebrovascular accident (CVA) due to thrombosis of left middle cerebral artery I63.312 434.01   2. Dysphagia, unspecified type R13.10 787.20   3. Decreased activities of daily living (ADL) Z78.9 V49.89   4. Impaired gait and mobility R26.89 781.2             Outcome Measures       17 1000 17 1600 17 0900    How much help from another is currently needed...    Putting on and taking off regular lower body clothing? 3  -TS 3  -MM 3  -MM    Bathing (including washing, rinsing, and drying) 3  -TS 3  -MM 3  -MM    Toileting (which includes using toilet bed pan or urinal) 3  -TS 3  -MM 3  -MM    Putting on and taking off regular upper body clothing 4  -TS 3  -MM 3  -MM    Taking care of personal grooming (such as brushing teeth) 3  -TS 3  -MM 3  -MM    Eating meals 3  -TS 3  -MM 3  -MM    Score 19  -TS 18  -MM 18  -MM    Functional Assessment    Outcome Measure Options AM-PAC 6 Clicks Daily Activity (OT)  -TS AM-PAC 6 Clicks Daily Activity (OT)  -MM AM-PAC 6 Clicks Daily Activity (OT)  -MM      17 0810          How much help from another person do you currently need...    Turning from your back to your side while in flat bed without using bedrails? 3  -NATALY (r) TSA (t) NATALY (c)      Moving from lying on back to sitting on the side of a flat bed without bedrails? 3  -NATALY (r) TSA (t) NATALY (c)      Moving to and from a bed to a chair (including a wheelchair)? 3  -NATALY (r) TSA (t) ANTALY (c)      Standing up from a chair using your arms (e.g., wheelchair, bedside chair)? 3  -NATALY (r) MAURICIO (t) NATALY (c)      Climbing 3-5 steps with a railing? 2  -NATALY  (r) TSA (t) NATALY (c)      To walk in hospital room? 3  -NATALY (r) TSA (t) NATALY (c)      AM-PAC 6 Clicks Score 17  -NATALY (r) TSA (t)      Functional Assessment    Outcome Measure Options AM-PAC 6 Clicks Basic Mobility (PT)  -NATALY (r) TSA (t) NATALY (c)        User Key  (r) = Recorded By, (t) = Taken By, (c) = Cosigned By    Initials Name Provider Type     Renata Benson, COYLE/L Occupational Therapy Assistant    NATALY Dent, PT DPT Physical Therapist    MM Dre De Oliveira, OTR/L Occupational Therapist    MAURICIO Benton, PT Student PT Student                      IP PT Goals       04/13/17 1207 04/11/17 0929       Transfer Training PT LTG    Transfer Training PT LTG, Date Established  04/11/17  -NATALY (r) TS (t) NATALY (c)     Transfer Training PT LTG, Time to Achieve  by discharge  -NATALY (r) TS (t) NATALY (c)     Transfer Training PT LTG, Activity Type  sit to stand/stand to sit  -NATALY (r) TS (t) NATALY (c)     Transfer Training PT LTG, [place] Level  supervision required  -NATALY (r) TS (t) NATALY (c)     Transfer Training PT  LTG, Date Goal Reviewed 04/13/17  -TR      Transfer Training PT LTG, Outcome goal met  -TR      Gait Training PT LTG    Gait Training Goal PT LTG, Date Established  04/11/17  -NATALY (r) TS (t) NATALY (c)     Gait Training Goal PT LTG, Time to Achieve  by discharge  -NATALY (r) TS (t) NATALY (c)     Gait Training Goal PT LTG, [place] Level  supervision required  -NATALY (r) TS (t) NATALY (c)     Gait Training Goal PT LTG, Distance to Achieve  250  -NATALY (r) TS (t) NATALY (c)     Gait Training Goal PT LTG, Date Goal Reviewed 04/13/17  -TR      Gait Training Goal PT LTG, Outcome goal not met  -TR      Gait Training Goal PT LTG, Reason Goal Not Met discharged from facility  -TR      Strength Goal PT LTG    Strength Goal PT LTG, Date Established  04/11/17  -NATALY (r) TS (t) NATALY (c)     Strength Goal PT LTG, Time to Achieve  by discharge  -NATALY (r) TS (t) NATALY (c)     Strength Goal PT LTG, Measure to Achieve  Pt will complete 10 repetitions of RLE  exercise with verbal cues  -NATALY (r) TS (t) NATALY (c)     Strength Goal PT LTG, Date Goal Reviewed 04/13/17  -TR      Strength Goal PT LTG, Outcome goal not met  -TR      Strength Goal PT LTG, Reason Goal Not Met discharged from facility  -TR      Static Standing Balance PT LTG    Static Standing Balance PT LTG, Date Established  04/11/17  -NATALY (r) TS (t) NATALY (c)     Static Standing Balance PT LTG, Time to Achieve  by discharge  -NATALY (r) TS (t) NATALY (c)     Static Standing Balance PT LTG, [place] Level  supervision required  -NATALY (r) TS (t) NATALY (c)     Static Standing Balance PT LTG, Additional Goal  Pt will stand for 1 minute with no LOB  -NATALY (r) TS (t) NATALY (c)     Static Standing Balance PT LTG, Date Goal Reviewed 04/13/17  -TR      Static Standing Balance PT LTG, Outcome goal not met  -TR      Static Standing Balance PT LTG, Reason Goal Not Met discharged from facility  -TR        User Key  (r) = Recorded By, (t) = Taken By, (c) = Cosigned By    Initials Name Provider Type    NATALY Dent, PT DPT Physical Therapist    TR Leonarda Fletcher, PTA Physical Therapy Assistant    MAGUI Benton, PT Student PT Student              PT Discharge Summary  Anticipated Discharge Disposition: inpatient rehabilitation facility  Reason for Discharge: Discharge from facility  Outcomes Achieved: Refer to plan of care for updates on goals achieved  Discharge Destination: Inpatient rehabilitation facility      Leonarda Fletcher, ANTONIA   4/13/2017

## 2017-04-13 NOTE — THERAPY DISCHARGE NOTE
Acute Care - Occupational Therapy Discharge Summary  [facility]     Patient Name: Donya Capellan  : 1950  MRN: [id number]    Today's Date: 2017  Onset of Illness/Injury or Date of Surgery Date: 17    Date of Referral to OT: 17  Referring Physician: Dr. Singletary      Admit Date: 2017        OT Recommendation and Plan    Visit Dx:    ICD-10-CM ICD-9-CM   1. Cerebrovascular accident (CVA) due to thrombosis of left middle cerebral artery I63.312 434.01   2. Dysphagia, unspecified type R13.10 787.20   3. Decreased activities of daily living (ADL) Z78.9 V49.89   4. Impaired gait and mobility R26.89 781.2                     OT Goals       17 0706 17 0922       Bed Mobility OT LTG    Bed Mobility OT LTG, Date Established  17  -MM     Bed Mobility OT LTG, Time to Achieve  by discharge  -MM     Bed Mobility OT LTG, Activity Type  all bed mobility  -MM     Bed Mobility OT LTG, [place] Level  independent  -MM     Bed Mobility OT LTG, Date Goal Reviewed 17  -TS      Bed Mobility OT LTG, Outcome goal not met  -TS      Bed Mobility OT LTG, Reason Goal Not Met discharged from facility  -TS      Strength OT LTG    Strength Goal OT LTG, Date Established  17  -MM     Strength Goal OT LTG, Time to Achieve  by discharge  -MM     Strength Goal OT LTG, Functional Goal  Pt will independently maintin BUE AROM HEP to increase strength to 4+/5 and increase independence in ADLs.  -MM     Strength Goal OT LTG, Date Goal Reviewed 17  -TS      Strength Goal OT LTG, Outcome goal not met  -TS      Strength Goal OT LTG, Reason Goal Not Met discharged from facility  -TS      Bathing OT LTG    Bathing Goal OT LTG, Date Established  17  -MM     Bathing Goal OT LTG, Time to Achieve  by discharge  -MM     Bathing Goal OT LTG, Activity Type  upper body bathing;lower body bathing  -MM     Bathing Goal OT LTG, [place] Level  conditional independence  -MM     Bathing Goal OT LTG,  Assist Device  sponge, long handled;grab bars;tub bench with back   AE PRN  -MM     Bathing Goal OT LTG, Date Goal Reviewed 04/13/17  -TS      Bathing Goal OT LTG, Outcome goal not met  -TS      Bathing Goal OT LTG, Reason Goal Not Met discharged from facility  -TS      LB Dressing OT LTG    LB Dressing Goal OT LTG, Date Established  04/11/17  -MM     LB Dressing Goal OT LTG, Time to Achieve  by discharge  -MM     LB Dressing Goal OT LTG, [place] Level  conditional independence  -MM     LB Dressing Goal OT LTG, Adaptive Equipment  reacher;shoe horn, long handled;sock-aid   AE PRN  -MM     LB Dressing Goal OT LTG, Date Goal Reviewed 04/13/17  -TS      LB Dressing Goal OT LTG, Outcome goal not met  -TS      LB Dressing Goal OT LTG, Reason Goal Not Met discharged from facility  -TS      Coordination OT LTG    Coordination OT LTG, Date Established  04/11/17  -MM     Coordination OT LTG, Time to Achieve  by discharge  -MM     Coordination OT LTG, Activity Type  FM written ex program;GM written ex program;FM task;GM task  -MM     Coordination OT LTG, [place] Level  independent  -MM     Coordination OT LTG, Date Goal Reviewed 04/13/17  -TS      Coordination OT LTG, Outcome goal not met  -TS      Coordination OT LTG, Reason Goal Not Met discharged from facility  -TS        User Key  (r) = Recorded By, (t) = Taken By, (c) = Cosigned By    Initials Name Provider Type     Renata Benson, COYLE/L Occupational Therapy Assistant    MM Dre De Oliveira, JOSÉ LUIS/L Occupational Therapist                Outcome Measures       04/12/17 1000 04/11/17 1600 04/11/17 0900    How much help from another is currently needed...    Putting on and taking off regular lower body clothing? 3  -TS 3  -MM 3  -MM    Bathing (including washing, rinsing, and drying) 3  -TS 3  -MM 3  -MM    Toileting (which includes using toilet bed pan or urinal) 3  -TS 3  -MM 3  -MM    Putting on and taking off regular upper body clothing 4  -TS 3  -MM 3   -MM    Taking care of personal grooming (such as brushing teeth) 3  -TS 3  -MM 3  -MM    Eating meals 3  -TS 3  -MM 3  -MM    Score 19  -TS 18  -MM 18  -MM    Functional Assessment    Outcome Measure Options AM-PAC 6 Clicks Daily Activity (OT)  -TS AM-PAC 6 Clicks Daily Activity (OT)  -MM AM-PAC 6 Clicks Daily Activity (OT)  -MM      04/11/17 0810          How much help from another person do you currently need...    Turning from your back to your side while in flat bed without using bedrails? 3  -NATALY (r) TSA (t) NATALY (c)      Moving from lying on back to sitting on the side of a flat bed without bedrails? 3  -NATALY (r) TSA (t) NATALY (c)      Moving to and from a bed to a chair (including a wheelchair)? 3  -NATALY (r) TSA (t) NATALY (c)      Standing up from a chair using your arms (e.g., wheelchair, bedside chair)? 3  -NATALY (r) TSA (t) NATALY (c)      Climbing 3-5 steps with a railing? 2  -NATALY (r) TSA (t) NATALY (c)      To walk in hospital room? 3  -NATALY (r) TSA (t) NATALY (c)      AM-PAC 6 Clicks Score 17  -NATALY (r) TSA (t)      Functional Assessment    Outcome Measure Options AM-PAC 6 Clicks Basic Mobility (PT)  -NATALY (r) TSA (t) NATALY (c)        User Key  (r) = Recorded By, (t) = Taken By, (c) = Cosigned By    Initials Name Provider Type    TS LEONIDES Holland/L Occupational Therapy Assistant    NATALY Dent, PT DPT Physical Therapist    ESPINOZA De Oliveira OTR/L Occupational Therapist    MAURICIO Benton, PT Student PT Student          Therapy Charges for Today     Code Description Service Date Service Provider Modifiers Qty    65419167310 HC OT THERAPEUTIC ACT EA 15 MIN 4/12/2017 LEONIDES Holland/L GO, KX 1    38875916443 HC OT SELF CARE/MGMT/TRAIN EA 15 MIN 4/12/2017 LEONIDES Holland/L GO, KX 1          OT Discharge Summary  Reason for Discharge: Discharge from facility  Outcomes Achieved: Refer to plan of care for updates on goals achieved  Discharge Destination: Inpatient rehabilitation facility      Renata  BRUNO Benson, COYLE/DANITA  4/13/2017

## 2017-04-13 NOTE — PLAN OF CARE
Problem: Inpatient Physical Therapy  Goal: Transfer Training Goal 1 LTG- PT  Outcome: Outcome(s) achieved Date Met:  04/13/17 04/11/17 0929 04/13/17 1207   Transfer Training PT LTG   Transfer Training PT LTG, Date Established 04/11/17 --    Transfer Training PT LTG, Time to Achieve by discharge --    Transfer Training PT LTG, Activity Type sit to stand/stand to sit --    Transfer Training PT LTG, [place] Level supervision required --    Transfer Training PT LTG, Date Goal Reviewed --  04/13/17   Transfer Training PT LTG, Outcome --  goal met       Goal: Gait Training Goal LTG- PT  Outcome: Unable to achieve outcome(s) by discharge Date Met:  04/13/17 04/11/17 0929 04/13/17 1207   Gait Training PT LTG   Gait Training Goal PT LTG, Date Established 04/11/17 --    Gait Training Goal PT LTG, Time to Achieve by discharge --    Gait Training Goal PT LTG, [place] Level supervision required --    Gait Training Goal PT LTG, Distance to Achieve 250 --    Gait Training Goal PT LTG, Date Goal Reviewed --  04/13/17   Gait Training Goal PT LTG, Outcome --  goal not met   Gait Training Goal PT LTG, Reason Goal Not Met --  discharged from facility       Goal: Strength Goal LTG- PT  Outcome: Unable to achieve outcome(s) by discharge Date Met:  04/13/17 04/11/17 0929 04/13/17 1207   Strength Goal PT LTG   Strength Goal PT LTG, Date Established 04/11/17 --    Strength Goal PT LTG, Time to Achieve by discharge --    Strength Goal PT LTG, Measure to Achieve Pt will complete 10 repetitions of RLE exercise with verbal cues --    Strength Goal PT LTG, Date Goal Reviewed --  04/13/17   Strength Goal PT LTG, Outcome --  goal not met   Strength Goal PT LTG, Reason Goal Not Met --  discharged from facility       Goal: Static Standing Balance Goal LTG- PT  Outcome: Unable to achieve outcome(s) by discharge Date Met:  04/13/17 04/11/17 0929 04/13/17 1207   Static Standing Balance PT LTG   Static Standing Balance PT LTG, Date  Established 04/11/17 --    Static Standing Balance PT LTG, Time to Achieve by discharge --    Static Standing Balance PT LTG, [place] Level supervision required --    Static Standing Balance PT LTG, Additional Goal Pt will stand for 1 minute with no LOB --    Static Standing Balance PT LTG, Date Goal Reviewed --  04/13/17   Static Standing Balance PT LTG, Outcome --  goal not met   Static Standing Balance PT LTG, Reason Goal Not Met --  discharged from facility

## 2017-04-13 NOTE — PLAN OF CARE
Problem: Inpatient Occupational Therapy  Goal: Bed Mobility Goal LTG- OT  Outcome: Unable to achieve outcome(s) by discharge Date Met:  04/13/17 04/11/17 0922 04/13/17 0706   Bed Mobility OT LTG   Bed Mobility OT LTG, Date Established 04/11/17 --    Bed Mobility OT LTG, Time to Achieve by discharge --    Bed Mobility OT LTG, Activity Type all bed mobility --    Bed Mobility OT LTG, [place] Level independent --    Bed Mobility OT LTG, Date Goal Reviewed --  04/13/17   Bed Mobility OT LTG, Outcome --  goal not met   Bed Mobility OT LTG, Reason Goal Not Met --  discharged from facility       Goal: Strength Goal LTG- OT  Outcome: Unable to achieve outcome(s) by discharge Date Met:  04/13/17 04/11/17 0922 04/13/17 0706   Strength OT LTG   Strength Goal OT LTG, Date Established 04/11/17 --    Strength Goal OT LTG, Time to Achieve by discharge --    Strength Goal OT LTG, Functional Goal Pt will independently maintin BUE AROM HEP to increase strength to 4+/5 and increase independence in ADLs. --    Strength Goal OT LTG, Date Goal Reviewed --  04/13/17   Strength Goal OT LTG, Outcome --  goal not met   Strength Goal OT LTG, Reason Goal Not Met --  discharged from facility       Goal: Bathing Goal LTG- OT  Outcome: Unable to achieve outcome(s) by discharge Date Met:  04/13/17 04/11/17 0922 04/13/17 0706   Bathing OT LTG   Bathing Goal OT LTG, Date Established 04/11/17 --    Bathing Goal OT LTG, Time to Achieve by discharge --    Bathing Goal OT LTG, Activity Type upper body bathing;lower body bathing --    Bathing Goal OT LTG, [place] Level conditional independence --    Bathing Goal OT LTG, Assist Device sponge, long handled;grab bars;tub bench with back  (AE PRN) --    Bathing Goal OT LTG, Date Goal Reviewed --  04/13/17   Bathing Goal OT LTG, Outcome --  goal not met   Bathing Goal OT LTG, Reason Goal Not Met --  discharged from facility       Goal: LB Dressing Goal LTG- OT  Outcome: Unable to achieve  outcome(s) by discharge Date Met:  04/13/17 04/11/17 0922 04/13/17 0706   LB Dressing OT LTG   LB Dressing Goal OT LTG, Date Established 04/11/17 --    LB Dressing Goal OT LTG, Time to Achieve by discharge --    LB Dressing Goal OT LTG, [place] Level conditional independence --    LB Dressing Goal OT LTG, Adaptive Equipment reacher;shoe horn, long handled;sock-aid  (AE PRN) --    LB Dressing Goal OT LTG, Date Goal Reviewed --  04/13/17   LB Dressing Goal OT LTG, Outcome --  goal not met   LB Dressing Goal OT LTG, Reason Goal Not Met --  discharged from facility       Goal: Coordination Goal LTG- OT  Outcome: Unable to achieve outcome(s) by discharge Date Met:  04/13/17 04/11/17 0922 04/13/17 0706   Coordination OT LTG   Coordination OT LTG, Date Established 04/11/17 --    Coordination OT LTG, Time to Achieve by discharge --    Coordination OT LTG, Activity Type FM written ex program;GM written ex program;FM task;GM task --    Coordination OT LTG, [place] Level independent --    Coordination OT LTG, Date Goal Reviewed --  04/13/17   Coordination OT LTG, Outcome --  goal not met   Coordination OT LTG, Reason Goal Not Met --  discharged from facility

## 2017-04-14 LAB
ORGANISM: ABNORMAL
URINE CULTURE, ROUTINE: ABNORMAL
URINE CULTURE, ROUTINE: ABNORMAL

## 2017-04-14 PROCEDURE — 99232 SBSQ HOSP IP/OBS MODERATE 35: CPT | Performed by: PSYCHIATRY & NEUROLOGY

## 2017-04-14 PROCEDURE — 97530 THERAPEUTIC ACTIVITIES: CPT

## 2017-04-14 PROCEDURE — 6370000000 HC RX 637 (ALT 250 FOR IP): Performed by: PSYCHIATRY & NEUROLOGY

## 2017-04-14 PROCEDURE — 97110 THERAPEUTIC EXERCISES: CPT

## 2017-04-14 PROCEDURE — 1180000000 HC REHAB R&B

## 2017-04-14 PROCEDURE — 97116 GAIT TRAINING THERAPY: CPT

## 2017-04-14 PROCEDURE — 97535 SELF CARE MNGMENT TRAINING: CPT

## 2017-04-14 PROCEDURE — 6360000002 HC RX W HCPCS: Performed by: PSYCHIATRY & NEUROLOGY

## 2017-04-14 RX ADMIN — ROPINIROLE HYDROCHLORIDE 40 MG: 1 TABLET, FILM COATED ORAL at 19:37

## 2017-04-14 RX ADMIN — MONTELUKAST SODIUM 10 MG: 10 TABLET, FILM COATED ORAL at 19:37

## 2017-04-14 RX ADMIN — ASPIRIN 325 MG ORAL TABLET 325 MG: 325 PILL ORAL at 08:32

## 2017-04-14 RX ADMIN — CIPROFLOXACIN HYDROCHLORIDE 500 MG: 500 TABLET, FILM COATED ORAL at 08:32

## 2017-04-14 RX ADMIN — BECLOMETHASONE DIPROPIONATE 2 PUFF: 80 AEROSOL, METERED RESPIRATORY (INHALATION) at 08:32

## 2017-04-14 RX ADMIN — PANTOPRAZOLE SODIUM 40 MG: 40 TABLET, DELAYED RELEASE ORAL at 05:32

## 2017-04-14 RX ADMIN — CIPROFLOXACIN HYDROCHLORIDE 500 MG: 500 TABLET, FILM COATED ORAL at 19:37

## 2017-04-14 RX ADMIN — ENOXAPARIN SODIUM 40 MG: 40 INJECTION SUBCUTANEOUS at 17:15

## 2017-04-14 RX ADMIN — LOSARTAN POTASSIUM 25 MG: 25 TABLET, FILM COATED ORAL at 08:32

## 2017-04-14 RX ADMIN — ATORVASTATIN CALCIUM 80 MG: 80 TABLET, FILM COATED ORAL at 19:37

## 2017-04-14 RX ADMIN — BECLOMETHASONE DIPROPIONATE 2 PUFF: 80 AEROSOL, METERED RESPIRATORY (INHALATION) at 19:37

## 2017-04-15 PROCEDURE — 1180000000 HC REHAB R&B

## 2017-04-15 PROCEDURE — 6370000000 HC RX 637 (ALT 250 FOR IP): Performed by: PSYCHIATRY & NEUROLOGY

## 2017-04-15 PROCEDURE — 99232 SBSQ HOSP IP/OBS MODERATE 35: CPT | Performed by: PSYCHIATRY & NEUROLOGY

## 2017-04-15 PROCEDURE — 6360000002 HC RX W HCPCS: Performed by: PSYCHIATRY & NEUROLOGY

## 2017-04-15 RX ADMIN — CIPROFLOXACIN HYDROCHLORIDE 500 MG: 500 TABLET, FILM COATED ORAL at 08:32

## 2017-04-15 RX ADMIN — ATORVASTATIN CALCIUM 80 MG: 80 TABLET, FILM COATED ORAL at 21:03

## 2017-04-15 RX ADMIN — MONTELUKAST SODIUM 10 MG: 10 TABLET, FILM COATED ORAL at 21:03

## 2017-04-15 RX ADMIN — ASPIRIN 325 MG ORAL TABLET 325 MG: 325 PILL ORAL at 08:32

## 2017-04-15 RX ADMIN — ENOXAPARIN SODIUM 40 MG: 40 INJECTION SUBCUTANEOUS at 21:02

## 2017-04-15 RX ADMIN — PANTOPRAZOLE SODIUM 40 MG: 40 TABLET, DELAYED RELEASE ORAL at 05:51

## 2017-04-15 RX ADMIN — ROPINIROLE HYDROCHLORIDE 40 MG: 1 TABLET, FILM COATED ORAL at 21:03

## 2017-04-15 RX ADMIN — LOSARTAN POTASSIUM 25 MG: 25 TABLET, FILM COATED ORAL at 08:32

## 2017-04-15 RX ADMIN — BECLOMETHASONE DIPROPIONATE 2 PUFF: 80 AEROSOL, METERED RESPIRATORY (INHALATION) at 08:33

## 2017-04-15 RX ADMIN — BECLOMETHASONE DIPROPIONATE 2 PUFF: 80 AEROSOL, METERED RESPIRATORY (INHALATION) at 21:08

## 2017-04-15 RX ADMIN — CYCLOBENZAPRINE HYDROCHLORIDE 10 MG: 10 TABLET, FILM COATED ORAL at 23:23

## 2017-04-15 RX ADMIN — CIPROFLOXACIN HYDROCHLORIDE 500 MG: 500 TABLET, FILM COATED ORAL at 21:03

## 2017-04-16 PROCEDURE — 6370000000 HC RX 637 (ALT 250 FOR IP): Performed by: PSYCHIATRY & NEUROLOGY

## 2017-04-16 PROCEDURE — 99232 SBSQ HOSP IP/OBS MODERATE 35: CPT | Performed by: PSYCHIATRY & NEUROLOGY

## 2017-04-16 PROCEDURE — 1180000000 HC REHAB R&B

## 2017-04-16 PROCEDURE — 6360000002 HC RX W HCPCS: Performed by: PSYCHIATRY & NEUROLOGY

## 2017-04-16 RX ADMIN — ATORVASTATIN CALCIUM 80 MG: 80 TABLET, FILM COATED ORAL at 19:30

## 2017-04-16 RX ADMIN — LOSARTAN POTASSIUM 25 MG: 25 TABLET, FILM COATED ORAL at 08:05

## 2017-04-16 RX ADMIN — NAPROXEN 500 MG: 500 TABLET ORAL at 04:30

## 2017-04-16 RX ADMIN — ENOXAPARIN SODIUM 40 MG: 40 INJECTION SUBCUTANEOUS at 17:01

## 2017-04-16 RX ADMIN — ASPIRIN 325 MG ORAL TABLET 325 MG: 325 PILL ORAL at 08:05

## 2017-04-16 RX ADMIN — CIPROFLOXACIN HYDROCHLORIDE 500 MG: 500 TABLET, FILM COATED ORAL at 08:05

## 2017-04-16 RX ADMIN — BECLOMETHASONE DIPROPIONATE 2 PUFF: 80 AEROSOL, METERED RESPIRATORY (INHALATION) at 08:05

## 2017-04-16 RX ADMIN — CIPROFLOXACIN HYDROCHLORIDE 500 MG: 500 TABLET, FILM COATED ORAL at 19:30

## 2017-04-16 RX ADMIN — PANTOPRAZOLE SODIUM 40 MG: 40 TABLET, DELAYED RELEASE ORAL at 06:18

## 2017-04-16 RX ADMIN — BECLOMETHASONE DIPROPIONATE 2 PUFF: 80 AEROSOL, METERED RESPIRATORY (INHALATION) at 21:29

## 2017-04-16 RX ADMIN — MONTELUKAST SODIUM 10 MG: 10 TABLET, FILM COATED ORAL at 19:30

## 2017-04-16 RX ADMIN — ROPINIROLE HYDROCHLORIDE 40 MG: 1 TABLET, FILM COATED ORAL at 19:31

## 2017-04-16 ASSESSMENT — PAIN SCALES - GENERAL: PAINLEVEL_OUTOF10: 8

## 2017-04-17 VITALS
SYSTOLIC BLOOD PRESSURE: 104 MMHG | WEIGHT: 137.3 LBS | HEART RATE: 88 BPM | TEMPERATURE: 96.8 F | RESPIRATION RATE: 18 BRPM | BODY MASS INDEX: 26.96 KG/M2 | HEIGHT: 60 IN | OXYGEN SATURATION: 98 % | DIASTOLIC BLOOD PRESSURE: 68 MMHG

## 2017-04-17 LAB
ALBUMIN SERPL-MCNC: 3.8 G/DL (ref 3.5–5.2)
ALP BLD-CCNC: 70 U/L (ref 35–104)
ALT SERPL-CCNC: 44 U/L (ref 5–33)
ANION GAP SERPL CALCULATED.3IONS-SCNC: 14 MMOL/L (ref 7–19)
AST SERPL-CCNC: 44 U/L (ref 5–32)
BASOPHILS ABSOLUTE: 0 K/UL (ref 0–0.2)
BASOPHILS RELATIVE PERCENT: 0.5 % (ref 0–1)
BILIRUB SERPL-MCNC: 0.5 MG/DL (ref 0.2–1.2)
BUN BLDV-MCNC: 19 MG/DL (ref 8–23)
CALCIUM SERPL-MCNC: 8.6 MG/DL (ref 8.8–10.2)
CHLORIDE BLD-SCNC: 103 MMOL/L (ref 98–111)
CO2: 24 MMOL/L (ref 22–29)
CREAT SERPL-MCNC: 0.9 MG/DL (ref 0.5–0.9)
EOSINOPHILS ABSOLUTE: 0.2 K/UL (ref 0–0.6)
EOSINOPHILS RELATIVE PERCENT: 2.9 % (ref 0–5)
GFR NON-AFRICAN AMERICAN: >60
GLOBULIN: 2.4 G/DL
GLUCOSE BLD-MCNC: 99 MG/DL (ref 74–109)
HCT VFR BLD CALC: 43.5 % (ref 37–47)
HEMOGLOBIN: 14.2 G/DL (ref 12–16)
LYMPHOCYTES ABSOLUTE: 2.4 K/UL (ref 1.1–4.5)
LYMPHOCYTES RELATIVE PERCENT: 30.1 % (ref 20–40)
MCH RBC QN AUTO: 29.3 PG (ref 27–31)
MCHC RBC AUTO-ENTMCNC: 32.6 G/DL (ref 33–37)
MCV RBC AUTO: 89.9 FL (ref 81–99)
MONOCYTES ABSOLUTE: 0.5 K/UL (ref 0–0.9)
MONOCYTES RELATIVE PERCENT: 6.2 % (ref 0–10)
NEUTROPHILS ABSOLUTE: 4.8 K/UL (ref 1.5–7.5)
NEUTROPHILS RELATIVE PERCENT: 60.2 % (ref 50–65)
PDW BLD-RTO: 12.7 % (ref 11.5–14.5)
PLATELET # BLD: 287 K/UL (ref 130–400)
PMV BLD AUTO: 9 FL (ref 7.4–10.4)
POTASSIUM SERPL-SCNC: 4.3 MMOL/L (ref 3.5–5)
RBC # BLD: 4.84 M/UL (ref 4.2–5.4)
SODIUM BLD-SCNC: 141 MMOL/L (ref 136–145)
TOTAL PROTEIN: 6.2 G/DL (ref 6.6–8.7)
WBC # BLD: 7.9 K/UL (ref 4.8–10.8)

## 2017-04-17 PROCEDURE — 85025 COMPLETE CBC W/AUTO DIFF WBC: CPT

## 2017-04-17 PROCEDURE — 80053 COMPREHEN METABOLIC PANEL: CPT

## 2017-04-17 PROCEDURE — 6370000000 HC RX 637 (ALT 250 FOR IP): Performed by: PSYCHIATRY & NEUROLOGY

## 2017-04-17 PROCEDURE — 36415 COLL VENOUS BLD VENIPUNCTURE: CPT

## 2017-04-17 PROCEDURE — 99239 HOSP IP/OBS DSCHRG MGMT >30: CPT | Performed by: PSYCHIATRY & NEUROLOGY

## 2017-04-17 RX ORDER — ASPIRIN 325 MG
325 TABLET ORAL DAILY
Qty: 30 TABLET | Refills: 0 | Status: SHIPPED | OUTPATIENT
Start: 2017-04-17

## 2017-04-17 RX ORDER — NORTRIPTYLINE HYDROCHLORIDE 10 MG/1
40 CAPSULE ORAL NIGHTLY
Qty: 30 CAPSULE | Refills: 0 | Status: SHIPPED | OUTPATIENT
Start: 2017-04-17

## 2017-04-17 RX ORDER — ATORVASTATIN CALCIUM 80 MG/1
80 TABLET, FILM COATED ORAL NIGHTLY
Qty: 30 TABLET | Refills: 0 | Status: SHIPPED | OUTPATIENT
Start: 2017-04-17

## 2017-04-17 RX ORDER — LOSARTAN POTASSIUM 25 MG/1
25 TABLET ORAL DAILY
Qty: 30 TABLET | Refills: 0 | Status: SHIPPED | OUTPATIENT
Start: 2017-04-17

## 2017-04-17 RX ADMIN — CIPROFLOXACIN HYDROCHLORIDE 500 MG: 500 TABLET, FILM COATED ORAL at 08:35

## 2017-04-17 RX ADMIN — BECLOMETHASONE DIPROPIONATE 2 PUFF: 80 AEROSOL, METERED RESPIRATORY (INHALATION) at 08:34

## 2017-04-17 RX ADMIN — PANTOPRAZOLE SODIUM 40 MG: 40 TABLET, DELAYED RELEASE ORAL at 06:22

## 2017-04-17 RX ADMIN — LOSARTAN POTASSIUM 25 MG: 25 TABLET, FILM COATED ORAL at 08:35

## 2017-04-17 RX ADMIN — ASPIRIN 325 MG ORAL TABLET 325 MG: 325 PILL ORAL at 08:35

## 2017-05-19 ENCOUNTER — OFFICE VISIT (OUTPATIENT)
Dept: NEUROLOGY | Facility: CLINIC | Age: 67
End: 2017-05-19

## 2017-05-19 VITALS
HEIGHT: 59 IN | HEART RATE: 90 BPM | WEIGHT: 130 LBS | BODY MASS INDEX: 26.21 KG/M2 | DIASTOLIC BLOOD PRESSURE: 82 MMHG | SYSTOLIC BLOOD PRESSURE: 122 MMHG

## 2017-05-19 DIAGNOSIS — E78.00 PURE HYPERCHOLESTEROLEMIA: ICD-10-CM

## 2017-05-19 DIAGNOSIS — I10 ESSENTIAL HYPERTENSION: ICD-10-CM

## 2017-05-19 DIAGNOSIS — K76.89 HEPATIC CYST: ICD-10-CM

## 2017-05-19 DIAGNOSIS — I63.312 CEREBROVASCULAR ACCIDENT (CVA) DUE TO THROMBOSIS OF LEFT MIDDLE CEREBRAL ARTERY (HCC): Primary | ICD-10-CM

## 2017-05-19 DIAGNOSIS — M54.2 CERVICALGIA: ICD-10-CM

## 2017-05-19 PROCEDURE — 99214 OFFICE O/P EST MOD 30 MIN: CPT | Performed by: CLINICAL NURSE SPECIALIST

## 2017-05-19 RX ORDER — CLOPIDOGREL BISULFATE 75 MG/1
75 TABLET ORAL DAILY
Qty: 30 TABLET | Refills: 5 | Status: SHIPPED | OUTPATIENT
Start: 2017-05-19 | End: 2017-12-07 | Stop reason: SDUPTHER

## 2017-05-25 ENCOUNTER — TELEPHONE (OUTPATIENT)
Dept: NEUROLOGY | Facility: CLINIC | Age: 67
End: 2017-05-25

## 2017-06-07 ENCOUNTER — LAB (OUTPATIENT)
Dept: LAB | Facility: HOSPITAL | Age: 67
End: 2017-06-07
Attending: INTERNAL MEDICINE

## 2017-06-07 ENCOUNTER — OFFICE VISIT (OUTPATIENT)
Dept: GASTROENTEROLOGY | Facility: CLINIC | Age: 67
End: 2017-06-07

## 2017-06-07 VITALS
OXYGEN SATURATION: 98 % | TEMPERATURE: 97 F | WEIGHT: 130 LBS | BODY MASS INDEX: 26.21 KG/M2 | HEART RATE: 107 BPM | HEIGHT: 59 IN | DIASTOLIC BLOOD PRESSURE: 84 MMHG | SYSTOLIC BLOOD PRESSURE: 128 MMHG

## 2017-06-07 DIAGNOSIS — R93.89 ABNORMAL FINDINGS ON IMAGING TEST: Primary | ICD-10-CM

## 2017-06-07 DIAGNOSIS — R93.89 ABNORMAL FINDINGS ON IMAGING TEST: ICD-10-CM

## 2017-06-07 LAB
ALBUMIN SERPL-MCNC: 4.2 G/DL (ref 3.5–5)
ALBUMIN/GLOB SERPL: 1.3 G/DL (ref 1.1–2.5)
ALP SERPL-CCNC: 102 U/L (ref 24–120)
ALT SERPL W P-5'-P-CCNC: 21 U/L (ref 0–54)
AMYLASE SERPL-CCNC: 71 U/L (ref 30–110)
ANION GAP SERPL CALCULATED.3IONS-SCNC: 12 MMOL/L (ref 4–13)
AST SERPL-CCNC: 32 U/L (ref 7–45)
BILIRUB SERPL-MCNC: 0.7 MG/DL (ref 0.1–1)
BUN BLD-MCNC: 12 MG/DL (ref 5–21)
BUN/CREAT SERPL: 12.6 (ref 7–25)
CALCIUM SPEC-SCNC: 9.4 MG/DL (ref 8.4–10.4)
CHLORIDE SERPL-SCNC: 101 MMOL/L (ref 98–110)
CO2 SERPL-SCNC: 30 MMOL/L (ref 24–31)
CREAT BLD-MCNC: 0.95 MG/DL (ref 0.5–1.4)
GFR SERPL CREATININE-BSD FRML MDRD: 58 ML/MIN/1.73
GLOBULIN UR ELPH-MCNC: 3.2 GM/DL
GLUCOSE BLD-MCNC: 96 MG/DL (ref 70–100)
LIPASE SERPL-CCNC: 96 U/L (ref 23–203)
POTASSIUM BLD-SCNC: 4 MMOL/L (ref 3.5–5.3)
PROT SERPL-MCNC: 7.4 G/DL (ref 6.3–8.7)
SODIUM BLD-SCNC: 143 MMOL/L (ref 135–145)

## 2017-06-07 PROCEDURE — 80053 COMPREHEN METABOLIC PANEL: CPT | Performed by: INTERNAL MEDICINE

## 2017-06-07 PROCEDURE — 83690 ASSAY OF LIPASE: CPT | Performed by: INTERNAL MEDICINE

## 2017-06-07 PROCEDURE — 82150 ASSAY OF AMYLASE: CPT | Performed by: INTERNAL MEDICINE

## 2017-06-07 PROCEDURE — 99204 OFFICE O/P NEW MOD 45 MIN: CPT | Performed by: INTERNAL MEDICINE

## 2017-06-07 PROCEDURE — 36415 COLL VENOUS BLD VENIPUNCTURE: CPT

## 2017-06-07 RX ORDER — ATORVASTATIN CALCIUM 10 MG/1
10 TABLET, FILM COATED ORAL DAILY
COMMUNITY
Start: 2017-05-26

## 2017-06-07 NOTE — PROGRESS NOTES
Primary Physician: Norbert Alvarenga MD    Chief Complaint   Patient presents with   • Follow-up     Patient states that she has some spots show up on her pancreas and stomach and she would like them checked out.       Subjective     Donya Capellan is a 66 y.o. female.  New pt referral.    HPI   Abnormal imaging  06/07/17   She was admitted to the hospital in April 2017 with a stroke.  She was given TPA.  She then developed symptoms that made him concerned that she had a dissection after the TPA says she had a CTA performed.    Results from a GI point of view are as follows:    There is a 1.5 cm solid appearing nodule. It is in close proximity to  the gastric fundus and could potentially represent a diverticulum but I  would favor that it is a pedunculated lesion emanating from the  pancreatic gland. This study needs to be repeated with oral contrast.  Ultrasound could also potentially be helpful to further evaluate this  finding. The pancreatic gland is otherwise unremarkable. There is  homogeneous enhancement of the kidneys. There is no evidence of  nephrolithiasis or obstructive uropathy. A small fat-containing  periumbilical hernia is present. There are a few diverticula noted of  the sigmoid colon without evidence of acute diverticulitis. There is a  suspected small fibroid of the uterine fundus measuring 6 mm in size. No  free fluid is present.    She has not had a prior endoscopy.  She did have a colonoscopy by me 09/19/2013 for rectal bleeding that showed only internal hemorrhoids.  She denies any abdominal pain, she overeats.  She denies any melena or hematochezia.  She is now on Plavix due to this recent stroke.  He did not have a repeat CT scan as suggested above.  Denies any pyrosis, dysphagia, odynophagia.  There is been no unintentional weight loss.      Past Medical History:   Diagnosis Date   • Asthma    • Hyperlipidemia    • Hypertension    • Stroke 04/09/2017       Past Surgical History:   Procedure  "Laterality Date   • COLONOSCOPY     • GALLBLADDER SURGERY     • TUBAL ABDOMINAL LIGATION          Current Outpatient Prescriptions:   •  atorvastatin (LIPITOR) 10 MG tablet, , Disp: , Rfl:   •  clopidogrel (PLAVIX) 75 MG tablet, Take 1 tablet by mouth Daily., Disp: 30 tablet, Rfl: 5  •  losartan (COZAAR) 25 MG tablet, Take 25 mg by mouth Daily., Disp: , Rfl:   •  montelukast (SINGULAIR) 10 MG tablet, Take 10 mg by mouth Every Night., Disp: , Rfl:   •  nortriptyline (PAMELOR) 10 MG capsule, Take 40 mg by mouth Every Night., Disp: , Rfl:     No Known Allergies    Social History     Social History   • Marital status:      Spouse name: N/A   • Number of children: N/A   • Years of education: N/A     Occupational History   • Not on file.     Social History Main Topics   • Smoking status: Never Smoker   • Smokeless tobacco: Never Used   • Alcohol use Yes      Comment: socially   • Drug use: No   • Sexual activity: No     Other Topics Concern   • Not on file     Social History Narrative       Family History   Problem Relation Age of Onset   • COPD Mother    • Cancer Sister    • Stroke Brother    • Stroke Maternal Grandfather        Review of Systems   Constitutional: Negative for unexpected weight change.   HENT: Negative for hearing loss and voice change.    Eyes: Negative for visual disturbance.   Respiratory: Negative for shortness of breath.    Cardiovascular: Negative for chest pain and palpitations.   Gastrointestinal:        See HPI   Endocrine: Negative for cold intolerance and heat intolerance.   Genitourinary: Negative for dysuria and hematuria.   Musculoskeletal: Negative for joint swelling.   Skin: Negative for rash.   Neurological: Negative for seizures.   Hematological: Does not bruise/bleed easily.   Psychiatric/Behavioral: The patient is not nervous/anxious.        Objective     /84  Pulse 107  Temp 97 °F (36.1 °C)  Ht 59\" (149.9 cm)  Wt 130 lb (59 kg)  SpO2 98%  BMI 26.26 " kg/m2    Physical Exam   Constitutional: She is oriented to person, place, and time. She appears well-developed.   HENT:   Head: Normocephalic.   Eyes: EOM are normal. No scleral icterus.   Neck: No thyromegaly present.   Cardiovascular: Normal rate and regular rhythm.    Pulmonary/Chest: Breath sounds normal.   Abdominal: Soft. Bowel sounds are normal. She exhibits no distension and no mass. There is no tenderness. There is no rebound and no guarding.   Musculoskeletal: Normal range of motion.   Neurological: She is alert and oriented to person, place, and time.   Skin: No rash noted.   Psychiatric: She has a normal mood and affect. Her behavior is normal.   Vitals reviewed.      Lab Results   Component Value Date    WBC 11.96 (H) 04/09/2017    WBC 9.16 10/02/2015    HGB 15.3 04/09/2017    HGB 15.8 10/02/2015    HCT 43.6 04/09/2017    HCT 44.8 10/02/2015     04/09/2017     10/02/2015        Lab Results   Component Value Date     04/09/2017     10/02/2015    K 4.0 04/09/2017    K 3.8 10/02/2015    CL 99 04/09/2017     10/02/2015    CO2 26.0 04/09/2017    CO2 27 10/02/2015    BUN 23 (H) 04/09/2017    BUN 12 10/02/2015    CREATININE 1.03 04/09/2017    CREATININE 0.66 10/02/2015    BILITOT 0.6 04/09/2017    BILITOT 0.6 10/02/2015    ALKPHOS 93 04/09/2017    ALKPHOS 99 10/02/2015    ALT 21 04/09/2017    ALT 34 10/02/2015    AST 33 04/09/2017    AST 33 10/02/2015    GLUCOSE 96 04/09/2017    GLUCOSE 109 (H) 10/02/2015       Lab Results   Component Value Date    INR 0.91 04/09/2017       IMPRESSION/PLAN:    Abnormal findings on imaging test-new  -     CT Abdomen Pelvis With Contrast; Future  -     Comprehensive Metabolic Panel; Future  -     Lipase; Future  -     Amylase; Future    It is unclear if there is a lesion in her stomach or pancreas.  We will first proceed with a CT of the abdomen as suggested by radiology.  If there is a pancreatic abnormality, then she will need referral for  endoscopic ultrasound.  I explained that this is not a test that was done here at [facility] but I would certainly help assist in getting her to a place where it could be done.  If there is abnormality in the stomach, then we can proceed with endoscopy.    The risks, benefits, and alternatives of endoscopy were reviewed with the patient today.  Risks including perforation, with or without dilation, possibly requiring surgery.  Additional risks include risk of bleeding.  There is also the risk of a drug reaction or problems with anesthesia.  This will be discussed with the further by the anesthesia team on the day of the procedure. The benefits include the diagnosis and management of disease of the upper digestive tract.  Alternatives to endoscopy include upper GI series, laboratory testing, radiographic evaluation, or no intervention.  The patient verbalizes understanding and agrees.    Will need to temper both EUS and endoscopy with the fact that she has had a recent stroke and has been placed on Plavix.  She would need to be off Plavix for either one of these scopes which increases her abdominal risk and comorbidity.  Hopefully CT imaging will be helpful in terms of triage seen what procedure if any needs to be performed.  I will see her back in 4 months time to regroup in earlier if nausea testing indicates otherwise.        Fariha Weber MD  06/07/17  3:17 PM    Much of this encounter note is an electronic transcription/translation of spoken language to printed text. The electronic translation of spoken language may permit erroneous, or at times, nonsensical words or phrases to be inadvertently transcribed; although I have reviewed the note for such errors, some may still exist.

## 2017-06-21 ENCOUNTER — HOSPITAL ENCOUNTER (OUTPATIENT)
Dept: CT IMAGING | Facility: HOSPITAL | Age: 67
Discharge: HOME | End: 2017-06-21
Attending: INTERNAL MEDICINE | Admitting: INTERNAL MEDICINE

## 2017-06-21 DIAGNOSIS — R93.89 ABNORMAL FINDINGS ON IMAGING TEST: ICD-10-CM

## 2017-06-21 LAB — CREAT BLDA-MCNC: 1 MG/DL (ref 0.6–1.3)

## 2017-06-21 PROCEDURE — 82565 ASSAY OF CREATININE: CPT

## 2017-06-21 PROCEDURE — 0 IOPAMIDOL 61 % SOLUTION: Performed by: INTERNAL MEDICINE

## 2017-06-21 PROCEDURE — 74177 CT ABD & PELVIS W/CONTRAST: CPT

## 2017-06-21 RX ADMIN — IOPAMIDOL 100 ML: 612 INJECTION, SOLUTION INTRAVENOUS at 10:00

## 2017-06-25 ENCOUNTER — TELEPHONE (OUTPATIENT)
Dept: GASTROENTEROLOGY | Facility: CLINIC | Age: 67
End: 2017-06-25

## 2017-06-25 DIAGNOSIS — R93.5 ABNORMAL CT OF THE ABDOMEN: Primary | ICD-10-CM

## 2017-06-25 NOTE — TELEPHONE ENCOUNTER
1. Hypodense mass within the left upper quadrant appears likely  peripherally calcified and is in closest proximity to the pancreas and  may arise from the pancreatic tail. This does not appear to represent a  gastric diverticulum. Additionally, this does not appear to arise from  the adjacent artery to suggest aneurysm. This is stable in size when  compared to the previous examination but new when compared to an  examination from 2010. This may represent a cystic neoplasm, such as  mucinous cystic neoplasm, and further evaluation with MRI of the abdomen  with and without contrast with MRCP is recommended..   2. Atherosclerotic disease.  3. Multiple hepatic cysts.        Please inform that this CT shows that the lesion seems to be more in the pancreas.  It seems to be stable in size compared to 2010.  The radiologist is recommending an MRI of the abdomen with and without contrast with MRCP.  I'll place into the computer.    Fariha Weber MD

## 2017-06-27 ENCOUNTER — TELEPHONE (OUTPATIENT)
Dept: GASTROENTEROLOGY | Facility: CLINIC | Age: 67
End: 2017-06-27

## 2017-06-27 ENCOUNTER — TRANSCRIBE ORDERS (OUTPATIENT)
Dept: ADMINISTRATIVE | Facility: HOSPITAL | Age: 67
End: 2017-06-27

## 2017-06-28 NOTE — TELEPHONE ENCOUNTER
I can't send something in to the pharmacy since the med needed is scheduled.  I am out of town on vacation so this will have to wait for my return.  She could check with her PCP.  I'm sure he could give her a valium pill.      Fariha Weber MD

## 2017-07-12 ENCOUNTER — HOSPITAL ENCOUNTER (OUTPATIENT)
Dept: MRI IMAGING | Facility: HOSPITAL | Age: 67
Discharge: HOME | End: 2017-07-12
Attending: INTERNAL MEDICINE | Admitting: INTERNAL MEDICINE

## 2017-07-12 DIAGNOSIS — R93.5 ABNORMAL CT OF THE ABDOMEN: ICD-10-CM

## 2017-07-12 LAB — CREAT BLDA-MCNC: 1 MG/DL (ref 0.6–1.3)

## 2017-07-12 PROCEDURE — 0 GADOBENATE DIMEGLUMINE 529 MG/ML SOLUTION: Performed by: INTERNAL MEDICINE

## 2017-07-12 PROCEDURE — A9577 INJ MULTIHANCE: HCPCS | Performed by: INTERNAL MEDICINE

## 2017-07-12 PROCEDURE — 82565 ASSAY OF CREATININE: CPT

## 2017-07-12 PROCEDURE — 74183 MRI ABD W/O CNTR FLWD CNTR: CPT

## 2017-07-12 RX ADMIN — GADOBENATE DIMEGLUMINE 12 ML: 529 INJECTION, SOLUTION INTRAVENOUS at 10:00

## 2017-07-17 ENCOUNTER — TELEPHONE (OUTPATIENT)
Dept: GASTROENTEROLOGY | Facility: CLINIC | Age: 67
End: 2017-07-17

## 2017-07-17 DIAGNOSIS — R93.5 ABNORMAL CT OF THE ABDOMEN: Primary | ICD-10-CM

## 2017-07-17 NOTE — TELEPHONE ENCOUNTER
1. 1.5 x 1.3 centimeter nonenhancing rounded lesion within the left  upper quadrant seen adjacent to the splenic artery and the pancreatic  tail. There is no visible communication with the intrapancreatic duct.  The signal characteristics argue against a suspicious solid nodule. A  thrombosed splenic artery aneurysm is considered versus exophytic  pancreatic lesion. A short-term follow-up CT abdomen study utilizing  pancreatic protocol is recommended in 3-6 months.  This report was finalized on 07/12/2017 13:01 by Dr. Rehnaa Rodriguez MD.     Please inform patient that the MRI is not helping us to decide exactly where this lesion is.  Radiologist is suggesting a repeat CT scan of the abdomen using a pancreatic protocol in 3 months.  She has an appointment with me in October.  Let us see if we can get the CT scan done just prior to that office visit.    Fariha Weber MD

## 2017-07-18 NOTE — TELEPHONE ENCOUNTER
I explained MRI results to her. I also explained that she will be receiving a call from [facility] in October to set up CT.

## 2017-10-09 ENCOUNTER — HOSPITAL ENCOUNTER (OUTPATIENT)
Dept: CT IMAGING | Facility: HOSPITAL | Age: 67
Discharge: HOME | End: 2017-10-09
Attending: INTERNAL MEDICINE | Admitting: INTERNAL MEDICINE

## 2017-10-09 DIAGNOSIS — R93.5 ABNORMAL CT OF THE ABDOMEN: ICD-10-CM

## 2017-10-09 LAB — CREAT BLDA-MCNC: 1 MG/DL (ref 0.6–1.3)

## 2017-10-09 PROCEDURE — 82565 ASSAY OF CREATININE: CPT

## 2017-10-09 PROCEDURE — 0 IOPAMIDOL 61 % SOLUTION: Performed by: INTERNAL MEDICINE

## 2017-10-09 PROCEDURE — 74177 CT ABD & PELVIS W/CONTRAST: CPT

## 2017-10-09 PROCEDURE — 0 IOHEXOL 300 MG/ML SOLUTION: Performed by: INTERNAL MEDICINE

## 2017-10-09 RX ADMIN — IOPAMIDOL 100 ML: 612 INJECTION, SOLUTION INTRAVENOUS at 09:30

## 2017-10-09 RX ADMIN — IOHEXOL 50 ML: 300 INJECTION, SOLUTION INTRAVENOUS at 09:30

## 2017-10-16 ENCOUNTER — OFFICE VISIT (OUTPATIENT)
Dept: GASTROENTEROLOGY | Facility: CLINIC | Age: 67
End: 2017-10-16

## 2017-10-16 ENCOUNTER — TELEPHONE (OUTPATIENT)
Dept: GASTROENTEROLOGY | Facility: CLINIC | Age: 67
End: 2017-10-16

## 2017-10-16 VITALS
DIASTOLIC BLOOD PRESSURE: 80 MMHG | HEIGHT: 59 IN | WEIGHT: 137 LBS | BODY MASS INDEX: 27.62 KG/M2 | OXYGEN SATURATION: 99 % | HEART RATE: 120 BPM | TEMPERATURE: 97.5 F | SYSTOLIC BLOOD PRESSURE: 144 MMHG

## 2017-10-16 DIAGNOSIS — Z79.01 ANTICOAGULATED BY ANTICOAGULATION TREATMENT: ICD-10-CM

## 2017-10-16 DIAGNOSIS — R93.5 ABNORMAL MAGNETIC RESONANCE IMAGING OF ABDOMEN: Primary | ICD-10-CM

## 2017-10-16 PROCEDURE — 99212 OFFICE O/P EST SF 10 MIN: CPT | Performed by: INTERNAL MEDICINE

## 2017-10-16 NOTE — TELEPHONE ENCOUNTER
I have placed CT recall in system. I have also set a reminder in the chart for this message to come back to me in 6 months.

## 2017-10-16 NOTE — TELEPHONE ENCOUNTER
I need to obtain a CT scan of the abdomen and pelvis with pancreatic protocol to follow-up on a pancreatic lesion.  This needs to be done in 6 months.  Please recall her.    Fariha Weber MD

## 2017-10-16 NOTE — ASSESSMENT & PLAN NOTE
Per radiology recommendation, I will repeat a CT scan a pancreatic protocol in 6 months.  I have asked my staff to send me a reminder when this is due.  I will inform patient of results and likely repeat in one year if it is as good as I suspect that it will be.

## 2017-10-16 NOTE — PROGRESS NOTES
Primary Physician: Norbert Alvarenga MD    Chief Complaint   Patient presents with   • Follow-up     Patient is here today following up abnormal CT.       Subjective     Donya Capellan is a 67 y.o. female.    HPI   Abnormal imaging  06/07/17   She was admitted to the hospital in April 2017 with a stroke.  She was given TPA.  She then developed symptoms that made him concerned that she had a dissection after the TPA says she had a CTA performed.    Results from a GI point of view are as follows:     There is a 1.5 cm solid appearing nodule. It is in close proximity to  the gastric fundus and could potentially represent a diverticulum but I  would favor that it is a pedunculated lesion emanating from the  pancreatic gland. This study needs to be repeated with oral contrast.  Ultrasound could also potentially be helpful to further evaluate this  finding. The pancreatic gland is otherwise unremarkable. There is  homogeneous enhancement of the kidneys. There is no evidence of  nephrolithiasis or obstructive uropathy. A small fat-containing  periumbilical hernia is present. There are a few diverticula noted of  the sigmoid colon without evidence of acute diverticulitis. There is a  suspected small fibroid of the uterine fundus measuring 6 mm in size. No  free fluid is present.     She has not had a prior endoscopy.  She did have a colonoscopy by me 09/19/2013 for rectal bleeding that showed only internal hemorrhoids.  She denies any abdominal pain, she overeats.  She denies any melena or hematochezia.  She is now on Plavix due to this recent stroke.  She did not have a repeat CT scan as suggested above.  Denies any pyrosis, dysphagia, odynophagia.  There is been no unintentional weight loss.    10/16/17  She has since had an MRI of the abd 7/21/2017 with results as noted below:    1.5 x 1.3 centimeter nonenhancing rounded lesion within the left  upper quadrant seen adjacent to the splenic artery and the pancreatic  tail.  There is no visible communication with the intrapancreatic duct.  The signal characteristics argue against a suspicious solid nodule. A  thrombosed splenic artery aneurysm is considered versus exophytic  pancreatic lesion. A short-term follow-up CT abdomen study utilizing  pancreatic protocol is recommended in 3-6 months.    The repeat CT with pancreatic protocol is as noted below:  Summary:  1. 1.6 cm exophytic low density nodule arising from the pancreas tail is  unchanged as compared with 6 months ago.  2. Follow-up postcontrast CT imaging is recommended in 6 months and if  this finding is stable at that time follow-up can be performed after one  year.    The symptom point of view, she is doing well.  Her appetite is good.  There has been no documented weight loss.  There is no melena or hematochezia.  No changes in bowel pattern.    Past Medical History:   Diagnosis Date   • Asthma    • Hyperlipidemia    • Hypertension    • Stroke 04/09/2017       Past Surgical History:   Procedure Laterality Date   • COLONOSCOPY     • GALLBLADDER SURGERY     • TUBAL ABDOMINAL LIGATION          Current Outpatient Prescriptions:   •  atorvastatin (LIPITOR) 10 MG tablet, , Disp: , Rfl:   •  clopidogrel (PLAVIX) 75 MG tablet, Take 1 tablet by mouth Daily., Disp: 30 tablet, Rfl: 5  •  losartan (COZAAR) 25 MG tablet, Take 25 mg by mouth Daily., Disp: , Rfl:   •  montelukast (SINGULAIR) 10 MG tablet, Take 10 mg by mouth Every Night., Disp: , Rfl:   •  nortriptyline (PAMELOR) 10 MG capsule, Take 40 mg by mouth Every Night., Disp: , Rfl:     No Known Allergies    Social History     Social History   • Marital status:      Spouse name: N/A   • Number of children: N/A   • Years of education: N/A     Occupational History   • Not on file.     Social History Main Topics   • Smoking status: Never Smoker   • Smokeless tobacco: Never Used   • Alcohol use Yes      Comment: socially   • Drug use: No   • Sexual activity: No     Other Topics  "Concern   • Not on file     Social History Narrative       Family History   Problem Relation Age of Onset   • COPD Mother    • Cancer Sister    • Stroke Brother    • Stroke Maternal Grandfather        Review of Systems   Constitutional: Negative for fever.   Respiratory: Negative for cough and shortness of breath.    Cardiovascular: Negative for chest pain.   Genitourinary: Negative for dysuria.   Skin: Negative for rash.   Neurological: Negative for seizures.       Objective     /80  Pulse 120  Temp 97.5 °F (36.4 °C)  Ht 59\" (149.9 cm)  Wt 137 lb (62.1 kg)  SpO2 99%  BMI 27.67 kg/m2    Physical Exam   Constitutional: She is oriented to person, place, and time. She appears well-developed.   Eyes: No scleral icterus.   Musculoskeletal: Normal range of motion.   Neurological: She is alert and oriented to person, place, and time.   Skin: No rash noted.   Psychiatric: She has a normal mood and affect. Her behavior is normal.   Vitals reviewed.      Lab Results   Component Value Date    WBC 11.96 (H) 04/09/2017    WBC 9.16 10/02/2015    HGB 15.3 04/09/2017    HGB 15.8 10/02/2015    HCT 43.6 04/09/2017    HCT 44.8 10/02/2015     04/09/2017     10/02/2015        Lab Results   Component Value Date     06/07/2017     04/09/2017     10/02/2015    K 4.0 06/07/2017    K 4.0 04/09/2017    K 3.8 10/02/2015     06/07/2017    CL 99 04/09/2017     10/02/2015    CO2 30.0 06/07/2017    CO2 26.0 04/09/2017    CO2 27 10/02/2015    BUN 12 06/07/2017    BUN 23 (H) 04/09/2017    BUN 12 10/02/2015    CREATININE 1.00 10/09/2017    CREATININE 1.00 07/12/2017    CREATININE 1.00 06/21/2017    BILITOT 0.7 06/07/2017    BILITOT 0.6 04/09/2017    BILITOT 0.6 10/02/2015    ALKPHOS 102 06/07/2017    ALKPHOS 93 04/09/2017    ALKPHOS 99 10/02/2015    ALT 21 06/07/2017    ALT 21 04/09/2017    ALT 34 10/02/2015    AST 32 06/07/2017    AST 33 04/09/2017    AST 33 10/02/2015    GLUCOSE 96 06/07/2017 "    GLUCOSE 96 04/09/2017    GLUCOSE 109 (H) 10/02/2015       Lab Results   Component Value Date    INR 0.91 04/09/2017       IMPRESSION/PLAN:    Assessment/Plan      Problem List Items Addressed This Visit        Hematopoietic and Hemostatic    Anticoagulated by anticoagulation treatment    Overview     Patient is on Plavix            Other    Abnormal magnetic resonance imaging of abdomen - Primary    Overview     7/21/2017:  1.5 x 1.3 centimeter nonenhancing rounded lesion within the left  upper quadrant seen adjacent to the splenic artery and the pancreatic tail. There is no visible communication with the intrapancreatic duct. The signal characteristics argue against a suspicious solid nodule. A thrombosed splenic artery aneurysm is considered versus exophytic pancreatic lesion.    Follow up CT   10/9/2017  1.6 cm exophytic low density nodule arising from the pancreas tail is unchanged as compared with 6 months ago.         Current Assessment & Plan     Per radiology recommendation, I will repeat a CT scan a pancreatic protocol in 6 months.  I have asked my staff to send me a reminder when this is due.  I will inform patient of results and likely repeat in one year if it is as good as I suspect that it will be.                 RTC prn      Fariha Weber MD  10/16/17  2:13 PM    Much of this encounter note is an electronic transcription/translation of spoken language to printed text. The electronic translation of spoken language may permit erroneous, or at times, nonsensical words or phrases to be inadvertently transcribed; although I have reviewed the note for such errors, some may still exist.

## 2017-12-07 ENCOUNTER — OFFICE VISIT (OUTPATIENT)
Dept: NEUROLOGY | Facility: CLINIC | Age: 67
End: 2017-12-07

## 2017-12-07 VITALS
WEIGHT: 132 LBS | SYSTOLIC BLOOD PRESSURE: 116 MMHG | RESPIRATION RATE: 20 BRPM | DIASTOLIC BLOOD PRESSURE: 82 MMHG | HEIGHT: 59 IN | HEART RATE: 114 BPM | BODY MASS INDEX: 26.61 KG/M2

## 2017-12-07 DIAGNOSIS — I10 ESSENTIAL HYPERTENSION: ICD-10-CM

## 2017-12-07 DIAGNOSIS — I63.312 CEREBROVASCULAR ACCIDENT (CVA) DUE TO THROMBOSIS OF LEFT MIDDLE CEREBRAL ARTERY (HCC): Primary | ICD-10-CM

## 2017-12-07 PROCEDURE — 99213 OFFICE O/P EST LOW 20 MIN: CPT | Performed by: CLINICAL NURSE SPECIALIST

## 2017-12-07 RX ORDER — CLOPIDOGREL BISULFATE 75 MG/1
75 TABLET ORAL DAILY
Qty: 30 TABLET | Refills: 5 | Status: SHIPPED | OUTPATIENT
Start: 2017-12-07 | End: 2018-12-07

## 2017-12-07 NOTE — PROGRESS NOTES
Subjective     Chief Complaint   Patient presents with   • Cerebrovascular Accident     3 month f/u (Last seen 5/19/17) of CVA.  Pt states that the swelling that she was having in her feet have been doing better.  She states that she is drinking a lot more water.       Donya Capellan is a 67 y.o. female right handed retiree.  She is here today for  follow up for RBG stroke. She was last seen 5/2017. She was transitioned from ASA to Plavix and stated she did stop it for several weeks secondary to severe bruising but then resumed. She denies bruising since restarting. She denies new stroke symptoms of unilateral weakness/numbness, speech changes, vision changes, or facial weakness. She denies myalgias with statin. She has had near complete resolution but does have some altered sensation primarily of right hand. She has no new complaints today.     During work up she did have hepatic lesion and was referred to Dr. Weber and per patient has follow up in 6 months.     As you recall in 4/2017 She had sudden onset of right face, arm, leg, numbness/tingling and later had some weakness. It did not improve and she presented to [facility] ED where Code Stroke was called. She was given IV TPA. During the infusion she began to have abdominal pain and atypical numbness. The infusion was temporarily suspended and patient had CTA abdomen and CT thoracic and lumbar spine to rule out hemorrhage. The CTs negative for hemorrhage and patient continued with infusion. She has had near complete resolution of her symptoms. She did spend 5 days in acute rehab. She has mild numbness on right jaw and palm of right hand.      Stroke   This is a chronic (4/9/17 left basal ganglia stroke and possible prior stroke right BG, RECIEVED IV TPA 4/2017) problem. The current episode started more than 1 month ago (4/2017). The problem has been resolved. Pertinent negatives include no arthralgias, chest pain, coughing, fatigue, fever, nausea, vomiting or weakness.  Numbness: right jaw and palm right hand. Associated symptoms comments: Had right face, arm, leg numbness/tingling and some weakness.this resolved and patient has remaining numbness right jaw and palm of right hand. Exacerbated by: HTN, dyslipid. Treatments tried: statin, ASA, recived IV TPA 4/9/17. The treatment provided significant relief.        Current Outpatient Prescriptions   Medication Sig Dispense Refill   • atorvastatin (LIPITOR) 10 MG tablet      • clopidogrel (PLAVIX) 75 MG tablet Take 1 tablet by mouth Daily. 30 tablet 5   • losartan (COZAAR) 25 MG tablet Take 25 mg by mouth Daily.     • montelukast (SINGULAIR) 10 MG tablet Take 10 mg by mouth Every Night.     • nortriptyline (PAMELOR) 10 MG capsule Take 40 mg by mouth Every Night.       No current facility-administered medications for this visit.        Past Medical History:   Diagnosis Date   • Anxiety    • Asthma    • Hyperlipidemia    • Hypertension    • Stroke 04/09/2017       Past Surgical History:   Procedure Laterality Date   • COLONOSCOPY     • GALLBLADDER SURGERY     • TUBAL ABDOMINAL LIGATION         family history includes COPD in her mother; Cancer in her sister; Stroke in her brother and maternal grandfather.    Social History   Substance Use Topics   • Smoking status: Never Smoker   • Smokeless tobacco: Never Used   • Alcohol use Yes      Comment: socially       Review of Systems   Constitutional: Negative.  Negative for fatigue and fever.   HENT: Negative.  Negative for hearing loss, postnasal drip and rhinorrhea.    Eyes: Negative.    Respiratory: Negative.  Negative for apnea, cough and chest tightness.    Cardiovascular: Negative.  Negative for chest pain and leg swelling.   Gastrointestinal: Negative.  Negative for blood in stool, constipation, diarrhea, nausea and vomiting.   Endocrine: Negative.    Genitourinary: Negative.  Negative for dysuria and frequency.   Musculoskeletal: Negative for arthralgias and gait problem.   Skin:  "Negative.    Allergic/Immunologic: Negative.    Neurological: Negative for dizziness, speech difficulty, weakness and light-headedness. Numbness: right jaw and palm right hand.   Hematological: Negative.  Negative for adenopathy.   Psychiatric/Behavioral: Negative.  Negative for agitation, confusion and hallucinations.   All other systems reviewed and are negative.      Objective     /82  Pulse 114  Resp 20  Ht 149.9 cm (59\")  Wt 59.9 kg (132 lb)  LMP  (LMP Unknown)  BMI 26.66 kg/m2, Body mass index is 26.66 kg/(m^2).    Physical Exam   Constitutional: She is oriented to person, place, and time. She appears well-developed and well-nourished.   HENT:   Head: Normocephalic and atraumatic.   Right Ear: External ear normal.   Left Ear: External ear normal.   Nose: Nose normal.   Mouth/Throat: Oropharynx is clear and moist.   Eyes: EOM and lids are normal. Pupils are equal, round, and reactive to light.   Neck: Normal range of motion. Neck supple. Carotid bruit is not present.   Cardiovascular: Normal rate, regular rhythm, S1 normal, S2 normal and normal heart sounds.    No murmur heard.  Pulmonary/Chest: Effort normal and breath sounds normal.   Abdominal: Soft. Bowel sounds are normal.   Musculoskeletal: Normal range of motion.   Neurological: She is alert and oriented to person, place, and time. She has normal strength and normal reflexes. She displays no tremor. No cranial nerve deficit or sensory deficit. She exhibits normal muscle tone. She displays a negative Romberg sign. Coordination and gait normal. GCS eye subscore is 4. GCS verbal subscore is 5. GCS motor subscore is 6.   Reflex Scores:       Tricep reflexes are 2+ on the right side and 2+ on the left side.       Bicep reflexes are 2+ on the right side and 2+ on the left side.       Brachioradialis reflexes are 2+ on the right side and 2+ on the left side.       Patellar reflexes are 2+ on the right side and 2+ on the left side.       Achilles " reflexes are 2+ on the right side and 2+ on the left side.  NIH STROKE SCORE = 0   Skin: Skin is warm and dry.   Psychiatric: She has a normal mood and affect. Her speech is normal and behavior is normal. Cognition and memory are normal.   Nursing note and vitals reviewed.      Results for orders placed or performed during the hospital encounter of 10/09/17   POC Creatinine   Result Value Ref Range    Creatinine 1.00 0.60 - 1.30 mg/dL        ASSESSMENT/PLAN    Diagnoses and all orders for this visit:    Cerebrovascular accident (CVA) due to thrombosis of left middle cerebral artery    Essential hypertension    Other orders  -     clopidogrel (PLAVIX) 75 MG tablet; Take 1 tablet by mouth Daily.    MEDICAL DECISION MAKIN.continue  Plavix monotherapy and counseled on compliance for secondary stroke prevention.  2. BP management per PCP for systolic less than 140.   3. Continue with statin per PCP for LDL goal less than 70.  4. Patient does not use tobacco.  5. Patient is counseled on stroke signs and symptoms using FAST and Time Saved is Brain Saved.  6.Discussed secondary stroke prevention to include a systolic blood pressure goal of less than 140, LDL goal less than 70, and 30-40 minutes of heart rate elevating exercise 3-4 times per week. Continue antiplatelet.   7. Patient's BMI is above normal parameters. Follow-up plan includes:  educational material and no follow-up required.      Patient given printed education with AVS for diet/exerise with AVS and encouraged to include 30 minutes of exercise 3-4 times weekly.        allergies and all known medications/prescriptions have been reviewed using resources available on this encounter.    Return in about 6 months (around 2018).        DELPHINE Palacio

## 2017-12-07 NOTE — PATIENT INSTRUCTIONS
Stroke Prevention  Some medical conditions and behaviors are associated with an increased chance of having a stroke. You may prevent a stroke by making healthy choices and managing medical conditions.  HOW CAN I REDUCE MY RISK OF HAVING A STROKE?   · Stay physically active. Get at least 30 minutes of activity on most or all days.  · Do not smoke. It may also be helpful to avoid exposure to secondhand smoke.  · Limit alcohol use. Moderate alcohol use is considered to be:  ¨ No more than 2 drinks per day for men.  ¨ No more than 1 drink per day for nonpregnant women.  · Eat healthy foods. This involves:  ¨ Eating 5 or more servings of fruits and vegetables a day.  ¨ Making dietary changes that address high blood pressure (hypertension), high cholesterol, diabetes, or obesity.  · Manage your cholesterol levels.  ¨ Making food choices that are high in fiber and low in saturated fat, trans fat, and cholesterol may control cholesterol levels.  ¨ Take any prescribed medicines to control cholesterol as directed by your health care provider.  · Manage your diabetes.  ¨ Controlling your carbohydrate and sugar intake is recommended to manage diabetes.  ¨ Take any prescribed medicines to control diabetes as directed by your health care provider.  · Control your hypertension.  ¨ Making food choices that are low in salt (sodium), saturated fat, trans fat, and cholesterol is recommended to manage hypertension.  ¨ Ask your health care provider if you need treatment to lower your blood pressure. Take any prescribed medicines to control hypertension as directed by your health care provider.  ¨ If you are 18-39 years of age, have your blood pressure checked every 3-5 years. If you are 40 years of age or older, have your blood pressure checked every year.  · Maintain a healthy weight.  ¨ Reducing calorie intake and making food choices that are low in sodium, saturated fat, trans fat, and cholesterol are recommended to manage  weight.  · Stop drug abuse.  · Avoid taking birth control pills.  ¨ Talk to your health care provider about the risks of taking birth control pills if you are over 35 years old, smoke, get migraines, or have ever had a blood clot.  · Get evaluated for sleep disorders (sleep apnea).  ¨ Talk to your health care provider about getting a sleep evaluation if you snore a lot or have excessive sleepiness.  · Take medicines only as directed by your health care provider.  ¨ For some people, aspirin or blood thinners (anticoagulants) are helpful in reducing the risk of forming abnormal blood clots that can lead to stroke. If you have the irregular heart rhythm of atrial fibrillation, you should be on a blood thinner unless there is a good reason you cannot take them.  ¨ Understand all your medicine instructions.  · Make sure that other conditions (such as anemia or atherosclerosis) are addressed.  SEEK IMMEDIATE MEDICAL CARE IF:   · You have sudden weakness or numbness of the face, arm, or leg, especially on one side of the body.  · Your face or eyelid droops to one side.  · You have sudden confusion.  · You have trouble speaking (aphasia) or understanding.  · You have sudden trouble seeing in one or both eyes.  · You have sudden trouble walking.  · You have dizziness.  · You have a loss of balance or coordination.  · You have a sudden, severe headache with no known cause.  · You have new chest pain or an irregular heartbeat.  Any of these symptoms may represent a serious problem that is an emergency. Do not wait to see if the symptoms will go away. Get medical help at once. Call your local emergency services (911 in [place] Do not drive yourself to the hospital.     This information is not intended to replace advice given to you by your health care provider. Make sure you discuss any questions you have with your health care provider.     Document Released: 01/25/2006 Document Revised: 01/08/2016 Document Reviewed:  06/20/2014  [employer] Interactive Patient Education ©2017 [employer]  BMI for Adults  Body mass index (BMI) is a number that is calculated from a person's weight and height. In most adults, the number is used to find how much of an adult's weight is made up of fat. BMI is not as accurate as a direct measure of body fat.  HOW IS BMI CALCULATED?  BMI is calculated by dividing weight in kilograms by height in meters squared. It can also be calculated by dividing weight in pounds by height in inches squared, then multiplying the resulting number by 703. Charts are available to help you find your BMI quickly and easily without doing this calculation.   HOW IS BMI INTERPRETED?  Health care professionals use BMI charts to identify whether an adult is underweight, at a normal weight, or overweight based on the following guidelines:  · Underweight: BMI less than 18.5.  · Normal weight: BMI between 18.5 and 24.9.  · Overweight: BMI between 25 and 29.9.  · Obese: BMI of 30 and above.  BMI is usually interpreted the same for males and females.  Weight includes both fat and muscle, so someone with a muscular build, such as an athlete, may have a BMI that is higher than 24.9. In cases like these, BMI may not accurately depict body fat. To determine if excess body fat is the cause of a BMI of 25 or higher, further assessments may need to be done by a health care provider.  WHY IS BMI A USEFUL TOOL?  BMI is used to identify a possible weight problem that may be related to a medical problem or may increase the risk for medical problems. BMI can also be used to promote changes to reach a healthy weight.     This information is not intended to replace advice given to you by your health care provider. Make sure you discuss any questions you have with your health care provider.     Document Released: 08/29/2005 Document Revised: 01/08/2016 Document Reviewed: 05/15/2015  Consumer Agent Portal (CAP) Patient Education ©2017 [employer]

## 2018-04-16 DIAGNOSIS — K86.9 PANCREATIC LESION: Primary | ICD-10-CM

## 2018-04-18 ENCOUNTER — HOSPITAL ENCOUNTER (OUTPATIENT)
Dept: CT IMAGING | Facility: HOSPITAL | Age: 68
Discharge: HOME | End: 2018-04-18
Attending: INTERNAL MEDICINE | Admitting: INTERNAL MEDICINE

## 2018-04-18 DIAGNOSIS — K86.9 PANCREATIC LESION: ICD-10-CM

## 2018-04-18 LAB — CREAT BLDA-MCNC: 1 MG/DL (ref 0.6–1.3)

## 2018-04-18 PROCEDURE — 82565 ASSAY OF CREATININE: CPT

## 2018-04-18 PROCEDURE — 25010000002 IOPAMIDOL 61 % SOLUTION: Performed by: INTERNAL MEDICINE

## 2018-04-18 PROCEDURE — 74177 CT ABD & PELVIS W/CONTRAST: CPT

## 2018-04-18 RX ADMIN — IOPAMIDOL 100 ML: 612 INJECTION, SOLUTION INTRAVENOUS at 09:45

## 2018-05-21 ENCOUNTER — TELEPHONE (OUTPATIENT)
Dept: GASTROENTEROLOGY | Facility: CLINIC | Age: 68
End: 2018-05-21

## 2018-05-21 NOTE — TELEPHONE ENCOUNTER
Impression     1. 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 Continued follow-up will be suggested.  2. Multiple hepatic cysts. There is steatosis at the liver.  3. Diverticulosis in the sigmoid colon without evidence for acute  diverticulitis..           Please inform patient that the lesion in the pancreas is still stable.  Per radiology they recommend repeating a CT with pancreatic protocol in 1 year.  Please place in recall for April 2019.    Fariha Weber MD

## 2018-05-22 NOTE — TELEPHONE ENCOUNTER
I have explained these results to her and put 1 year CT recall in system. I have also set a reminder in the chart for this message to come back to me in March of next year so I can call her to remind her that this is due to be done in April.

## 2019-04-01 NOTE — H&P (VIEW-ONLY)
Name:  Donya Capellan  YOB: 1950  Location: [facility]  Location Address: [address] [place] [address], [facility], [address] [place] [address]  Location Phone: [phone]    Chief Complaint  Chief Complaint   Patient presents with   • Skin Lesion       History of Present Illness  The patient  is a 68 y.o. female who is referred by Ashleigh Jiménez MD for preoperative evaluation. She complains of a lesionbilateral nose present for several year(s)  It has not been  biopsied.  The lesions have gotten larger and are sore.               Past Medical History:   Diagnosis Date   • Anxiety    • Asthma    • Hyperlipidemia    • Hypertension    • Stroke (CMS/HCC) 2017       Past Surgical History:   Procedure Laterality Date   • COLONOSCOPY     • GALLBLADDER SURGERY     • TUBAL ABDOMINAL LIGATION           Current Outpatient Medications:   •  atorvastatin (LIPITOR) 10 MG tablet, , Disp: , Rfl:   •  CARTIA  MG 24 hr capsule, , Disp: , Rfl:   •  clopidogrel (PLAVIX) 75 MG tablet, , Disp: , Rfl:   •  losartan (COZAAR) 25 MG tablet, Take 25 mg by mouth Daily., Disp: , Rfl:   •  montelukast (SINGULAIR) 10 MG tablet, Take 10 mg by mouth Every Night., Disp: , Rfl:   •  nortriptyline (PAMELOR) 10 MG capsule, Take 40 mg by mouth Every Night., Disp: , Rfl:     Patient has no known allergies.    Family History   Problem Relation Age of Onset   • COPD Mother    • Cancer Sister    • Stroke Brother    • Stroke Maternal Grandfather        Social History     Socioeconomic History   • Marital status:      Spouse name: Not on file   • Number of children: Not on file   • Years of education: Not on file   • Highest education level: Not on file   Tobacco Use   • Smoking status: Never Smoker   • Smokeless tobacco: Never Used   Substance and Sexual Activity   • Alcohol use: Yes     Comment: socially   • Drug use: No   • Sexual activity: No       Review of Systems   Constitutional: Negative for activity change,  appetite change, chills, diaphoresis, fatigue, fever and unexpected weight change.   HENT: Negative for congestion, dental problem, drooling, ear discharge, ear pain, facial swelling, hearing loss, mouth sores, nosebleeds, postnasal drip, rhinorrhea, sinus pressure, sneezing, sore throat, tinnitus, trouble swallowing and voice change.    Eyes: Negative.    Respiratory: Negative.    Cardiovascular: Negative.    Gastrointestinal: Negative.    Endocrine: Negative.    Skin: Negative.         Bilateral nasal ala lesions   Allergic/Immunologic: Negative for environmental allergies, food allergies and immunocompromised state.   Neurological: Negative.    Hematological: Negative.    Psychiatric/Behavioral: Negative.        Vitals:    04/02/19 1041   BP: 133/68   Pulse: 93   Resp: 20   Temp: 98.2 °F (36.8 °C)       Objective     Physical Exam   Constitutional: Vital signs are normal. She appears well-developed and well-nourished. No distress.   HENT:   Head: Normocephalic and atraumatic.   Right Ear: External ear normal.   Left Ear: External ear normal.   Nose:       Eyes: Conjunctivae and EOM are normal. Pupils are equal, round, and reactive to light. No scleral icterus.   Pulmonary/Chest: Effort normal.   Neurological: She is alert. No cranial nerve deficit.   Skin: Skin is warm and dry. No rash noted. She is not diaphoretic. No erythema. No pallor.   Psychiatric: She has a normal mood and affect. Her behavior is normal. Judgment and thought content normal.   Vitals reviewed.      Assessment/Plan   Donya was seen today for skin lesion.    Diagnoses and all orders for this visit:    Neoplasm of uncertain behavior of skin of nose  -     Case Request; Standing  -     Comprehensive Metabolic Panel; Future  -     CBC and Differential; Future  -     Protime-INR; Future  -     APTT; Future  -     ECG 12 Lead; Future  -     XR Chest 2 View; Future  -     Case Request    Anticoagulated   -     Protime-INR; Future  -     APTT;  Future    Other orders  -     Follow Anesthesia Guidelines / Standing Orders; Future  -     Obtain Informed Consent  -     Follow Anesthesia Guidelines / Standing Orders; Standing  -     Verify NPO Status; Standing  -     Obtain Informed Consent; Standing  -     SCD (Sequential Compression Device) - To Be Placed on Patient in Pre-Op; Standing  -     NPO Diet; Standing  -     Provide Patient With Instructions on NPO Status; Future  -     Patient to Void Prior to Transfer to OR; Standing      EXCISION LESION OF UNCERTAIN SIGNIFICANCE OF THE RIGHT AND LEFT NASAL ALA WITH POSSIBLE FROZEN SECTION AND POSSIBLE FLAP OR GRAFT (Bilateral), POSSIBLE FLAP OR GRAFT (Bilateral)  Orders Placed This Encounter   Procedures   • XR Chest 2 View     Standing Status:   Future     Standing Expiration Date:   4/1/2020     Order Specific Question:   Reason for Exam:     Answer:   HYPERTENSION   • Comprehensive Metabolic Panel     Standing Status:   Future     Standing Expiration Date:   4/1/2020   • Protime-INR     Standing Status:   Future     Standing Expiration Date:   4/1/2020   • APTT     Standing Status:   Future     Standing Expiration Date:   4/1/2020   • Follow Anesthesia Guidelines / Standing Orders     Standing Status:   Future     Standing Expiration Date:   4/2/2020   • Obtain Informed Consent     EXCISION LESION with possible flap or graft-     Order Specific Question:   Informed Consent Given For     Answer:   EXCISION LESION OF UNCERTAIN SIGNIFICANCE OF THE RIGHT AND LEFT NASAL ALA WITH POSSIBLE FROZEN SECTION AND POSSIBLE FLAP OR GRAFT   • Provide Patient With Instructions on NPO Status     Standing Status:   Future   • ECG 12 Lead     Standing Status:   Future     Standing Expiration Date:   4/1/2020     Order Specific Question:   Reason for Exam:     Answer:   HYPERTENSION   • CBC and Differential     Standing Status:   Future     Standing Expiration Date:   4/1/2020     Order Specific Question:   Manual Differential      Answer:   No     Return for Follow-up post-operatively as directed.       Patient Instructions   Discussion of skin lesion. Discussed risks, benefits, alternatives, and possible complications of excision of the skin lesion with reconstruction utilizing local tissue rearrangement, full-thickness skin grafting, or local interpolated flaps. Risks include, but are not limited too: bleeding, infection, hematoma, recurrence, need for additional procedures, flap failure, cosmetic deformity. Patient understands risks and would like to proceed with surgery.

## 2019-04-02 ENCOUNTER — OFFICE VISIT (OUTPATIENT)
Dept: OTOLARYNGOLOGY | Facility: CLINIC | Age: 69
End: 2019-04-02

## 2019-04-02 VITALS
RESPIRATION RATE: 20 BRPM | HEIGHT: 59 IN | DIASTOLIC BLOOD PRESSURE: 68 MMHG | WEIGHT: 114.6 LBS | BODY MASS INDEX: 23.1 KG/M2 | TEMPERATURE: 98.2 F | HEART RATE: 93 BPM | SYSTOLIC BLOOD PRESSURE: 133 MMHG

## 2019-04-02 DIAGNOSIS — D48.5 NEOPLASM OF UNCERTAIN BEHAVIOR OF SKIN OF NOSE: Primary | ICD-10-CM

## 2019-04-02 DIAGNOSIS — K86.9 PANCREATIC LESION: Primary | ICD-10-CM

## 2019-04-02 DIAGNOSIS — Z79.01 ANTICOAGULATED: ICD-10-CM

## 2019-04-02 PROCEDURE — 99214 OFFICE O/P EST MOD 30 MIN: CPT | Performed by: PHYSICIAN ASSISTANT

## 2019-04-02 RX ORDER — CLOPIDOGREL BISULFATE 75 MG/1
75 TABLET ORAL DAILY
COMMUNITY
Start: 2019-03-07

## 2019-04-02 RX ORDER — DILTIAZEM HYDROCHLORIDE 120 MG/1
120 CAPSULE, EXTENDED RELEASE ORAL DAILY
COMMUNITY
Start: 2019-03-07 | End: 2022-03-08 | Stop reason: ALTCHOICE

## 2019-04-05 ENCOUNTER — TELEPHONE (OUTPATIENT)
Dept: OTOLARYNGOLOGY | Facility: CLINIC | Age: 69
End: 2019-04-05

## 2019-04-05 NOTE — TELEPHONE ENCOUNTER
Per Prisca with Dr. Alvarenga, patient is clear to stop Plavix. They will place patient on Lovenox and call her with directions    4/8/19 Patient notified

## 2019-04-12 ENCOUNTER — TELEPHONE (OUTPATIENT)
Dept: GASTROENTEROLOGY | Facility: CLINIC | Age: 69
End: 2019-04-12

## 2019-04-17 ENCOUNTER — HOSPITAL ENCOUNTER (OUTPATIENT)
Dept: GENERAL RADIOLOGY | Facility: HOSPITAL | Age: 69
Discharge: HOME | End: 2019-04-17
Admitting: PHYSICIAN ASSISTANT

## 2019-04-17 ENCOUNTER — APPOINTMENT (OUTPATIENT)
Dept: PREADMISSION TESTING | Facility: HOSPITAL | Age: 69
End: 2019-04-17

## 2019-04-17 VITALS
SYSTOLIC BLOOD PRESSURE: 138 MMHG | RESPIRATION RATE: 18 BRPM | OXYGEN SATURATION: 98 % | HEIGHT: 59 IN | DIASTOLIC BLOOD PRESSURE: 81 MMHG | HEART RATE: 95 BPM | BODY MASS INDEX: 23.16 KG/M2 | WEIGHT: 114.86 LBS

## 2019-04-17 DIAGNOSIS — Z79.01 ANTICOAGULATED: ICD-10-CM

## 2019-04-17 DIAGNOSIS — D48.5 NEOPLASM OF UNCERTAIN BEHAVIOR OF SKIN OF NOSE: ICD-10-CM

## 2019-04-17 LAB
ALBUMIN SERPL-MCNC: 4.9 G/DL (ref 3.5–5)
ALBUMIN/GLOB SERPL: 1.5 G/DL (ref 1.1–2.5)
ALP SERPL-CCNC: 121 U/L (ref 24–120)
ALT SERPL W P-5'-P-CCNC: 21 U/L (ref 0–54)
ANION GAP SERPL CALCULATED.3IONS-SCNC: 13 MMOL/L (ref 4–13)
APTT PPP: 36.6 SECONDS (ref 24.1–35)
AST SERPL-CCNC: 50 U/L (ref 7–45)
BASOPHILS # BLD AUTO: 0.06 10*3/MM3 (ref 0–0.2)
BASOPHILS NFR BLD AUTO: 0.7 % (ref 0–2)
BILIRUB SERPL-MCNC: 0.8 MG/DL (ref 0.1–1)
BUN BLD-MCNC: 13 MG/DL (ref 5–21)
BUN/CREAT SERPL: 13.4 (ref 7–25)
CALCIUM SPEC-SCNC: 10 MG/DL (ref 8.4–10.4)
CHLORIDE SERPL-SCNC: 99 MMOL/L (ref 98–110)
CO2 SERPL-SCNC: 29 MMOL/L (ref 24–31)
CREAT BLD-MCNC: 0.97 MG/DL (ref 0.5–1.4)
DEPRECATED RDW RBC AUTO: 39.6 FL (ref 40–54)
EOSINOPHIL # BLD AUTO: 0.21 10*3/MM3 (ref 0–0.7)
EOSINOPHIL NFR BLD AUTO: 2.6 % (ref 0–4)
ERYTHROCYTE [DISTWIDTH] IN BLOOD BY AUTOMATED COUNT: 12.1 % (ref 12–15)
GFR SERPL CREATININE-BSD FRML MDRD: 57 ML/MIN/1.73
GLOBULIN UR ELPH-MCNC: 3.3 GM/DL
GLUCOSE BLD-MCNC: 97 MG/DL (ref 70–100)
HCT VFR BLD AUTO: 45.5 % (ref 37–47)
HGB BLD-MCNC: 15.8 G/DL (ref 12–16)
IMM GRANULOCYTES # BLD AUTO: 0.02 10*3/MM3 (ref 0–0.05)
IMM GRANULOCYTES NFR BLD AUTO: 0.2 % (ref 0–5)
INR PPP: 1 (ref 0.91–1.09)
LYMPHOCYTES # BLD AUTO: 2.19 10*3/MM3 (ref 0.72–4.86)
LYMPHOCYTES NFR BLD AUTO: 27.2 % (ref 15–45)
MCH RBC QN AUTO: 30.7 PG (ref 28–32)
MCHC RBC AUTO-ENTMCNC: 34.7 G/DL (ref 33–36)
MCV RBC AUTO: 88.5 FL (ref 82–98)
MONOCYTES # BLD AUTO: 0.46 10*3/MM3 (ref 0.19–1.3)
MONOCYTES NFR BLD AUTO: 5.7 % (ref 4–12)
NEUTROPHILS # BLD AUTO: 5.1 10*3/MM3 (ref 1.87–8.4)
NEUTROPHILS NFR BLD AUTO: 63.6 % (ref 39–78)
NRBC BLD AUTO-RTO: 0 /100 WBC (ref 0–0)
PLATELET # BLD AUTO: 372 10*3/MM3 (ref 130–400)
PMV BLD AUTO: 9.2 FL (ref 6–12)
POTASSIUM BLD-SCNC: 3.9 MMOL/L (ref 3.5–5.3)
PROT SERPL-MCNC: 8.2 G/DL (ref 6.3–8.7)
PROTHROMBIN TIME: 13.5 SECONDS (ref 11.9–14.6)
RBC # BLD AUTO: 5.14 10*6/MM3 (ref 4.2–5.4)
SODIUM BLD-SCNC: 141 MMOL/L (ref 135–145)
WBC NRBC COR # BLD: 8.04 10*3/MM3 (ref 4.8–10.8)

## 2019-04-17 PROCEDURE — 93005 ELECTROCARDIOGRAM TRACING: CPT

## 2019-04-17 PROCEDURE — 85730 THROMBOPLASTIN TIME PARTIAL: CPT | Performed by: PHYSICIAN ASSISTANT

## 2019-04-17 PROCEDURE — 71046 X-RAY EXAM CHEST 2 VIEWS: CPT

## 2019-04-17 PROCEDURE — 80053 COMPREHEN METABOLIC PANEL: CPT | Performed by: PHYSICIAN ASSISTANT

## 2019-04-17 PROCEDURE — 36415 COLL VENOUS BLD VENIPUNCTURE: CPT

## 2019-04-17 PROCEDURE — 85610 PROTHROMBIN TIME: CPT | Performed by: PHYSICIAN ASSISTANT

## 2019-04-17 PROCEDURE — 93010 ELECTROCARDIOGRAM REPORT: CPT | Performed by: INTERNAL MEDICINE

## 2019-04-17 PROCEDURE — 85025 COMPLETE CBC W/AUTO DIFF WBC: CPT | Performed by: PHYSICIAN ASSISTANT

## 2019-04-17 NOTE — DISCHARGE INSTRUCTIONS
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  Following surgery it is unrealistic to expect you will not have pain.   Pain is how our bodies let us know that something is wrong or cautions us to be careful.  That said, our goal is to make your pain tolerable.    Methods we may use to treat your pain include (oral or IV medications, PCAs, epidurals, nerve blocks, etc.)   While some procedures require IV pain medications for a short time after surgery, transitioning to pain medications by mouth allows for better management of pain.   Your nurse will encourage you to take oral pain medications whenever possible.  IV medications work almost immediately, but only last a short while.  Taking medications by mouth allows for a more constant level of medication in your blood stream for a longer period of time.      Once your pain is out of control it is harder to get back under control.  It is important you are aware when your next dose of pain medication is due.  If you are admitted, your nurse may write the time of your next dose on the white board in your room to help you remember.      We are interested in your pain and encourage you to inform us about aggravating factors during your visit.   Many times a simple repositioning every few hours can make a big difference.    If your physician says it is okay, do not let your pain prevent you from getting out of  bed. Be sure to call your nurse for assistance prior to getting up so you do not fall.      Before surgery, please decide your tolerable pain goal.  These faces help describe the pain ratings we use on a 0-10 scale.   Be prepared to tell us your goal and whether or not you take pain or anxiety medications at home.            BEFORE YOU COME TO THE HOSPITAL  (Pre-op instructions)  • Do not eat, drink, smoke or chew gum after midnight the night before surgery.  This also includes no mints.  • Morning of surgery take only the medicines you have been instructed with a sip of water unless otherwise instructed  by your physician.  • Do not shave, wear makeup or dark nail polish.  • Remove all jewelry including rings.  • Leave anything you consider valuable at home.  • Leave your suitcase in the car until after your surgery.  • Bring the following with you if applicable:  o Picture ID and insurance, Medicare or Medicaid cards  o Co-pay/deductible required by insurance (cash, check, credit card)  o Copy of advance directive, living will or power-of- documents if not brought to PAT  o CPAP or BIPAP mask and tubing  o Relaxation aids (MP3 player, book, magazine)  • On the day of surgery check in at registration located at the main entrance of the hospital.       Outpatient Surgery Guidelines, Adult  Outpatient procedures are those for which the person having the procedure is allowed to go home the same day as the procedure. Various procedures are done on an outpatient basis. You should follow some general guidelines if you will be having an outpatient procedure.  LET YOUR HEALTH CARE PROVIDER KNOW ABOUT:  · Any allergies you have.  · All medicines you are taking, including vitamins, herbs, eye drops, creams, and over-the-counter medicines.  · Previous problems you or members of your family have had with the use of anesthetics.  · Any blood disorders you have.  · Previous surgeries you have had.  · Medical conditions  you have.  RISKS AND COMPLICATIONS  Your health care provider will discuss possible risks and complications with you before surgery. Common risks and complications include:    · Problems due to the use of anesthetics.  · Blood loss and replacement (does not apply to minor surgical procedures).  · Temporary increase in pain due to surgery.  · Uncorrected pain or problems that the surgery was meant to correct.  · Infection.  · New damage.  BEFORE THE PROCEDURE  · Ask your health care provider about changing or stopping your regular medicines. You may need to stop taking certain medicines in the days or weeks before the procedure.  · Stop smoking at least 2 weeks before surgery. This lowers your risk for complications during and after surgery. Ask your health care provider for help with this if needed.  · Eat your usual meals and a light supper the day before surgery. Continue fluid intake. Do not drink alcohol.  · Do not eat or drink after midnight the night before your surgery.   · Arrange for someone to take you home and to stay with you for 24 hours after the procedure. Medicine given for your procedure may affect your ability to drive or to care for yourself.  · Call your health care provider's office if you develop an illness or problem that may prevent you from safely having your procedure.  AFTER THE PROCEDURE  After surgery, you will be taken to a recovery area, where your progress will be monitored. If there are no complications, you will be allowed to go home when you are awake, stable, and taking fluids well. You may have numbness around the surgical site. Healing will take some time. You will have tenderness at the surgical site and may have some swelling and bruising. You may also have some nausea.  HOME CARE INSTRUCTIONS  · Do not drive for 24 hours, or as directed by your health care provider. Do not drive while taking prescription pain medicines.  · Do not drink alcohol for 24 hours.  · Do not make  important decisions or sign legal documents for 24 hours.  · You may resume a normal diet and activities as directed.  · Do not lift anything heavier than 10 pounds (4.5 kg) or play contact sports until your health care provider says it is okay.  · Change your bandages (dressings) as directed.  · Only take over-the-counter or prescription medicines as directed by your health care provider.  · Follow up with your health care provider as directed.  SEEK MEDICAL CARE IF:  · You have increased bleeding (more than a small spot) from the surgical site.  · You have redness, swelling, or increasing pain in the wound.  · You see pus coming from the wound.  · You have a fever.  · You notice a bad smell coming from the wound or dressing.  · You feel lightheaded or faint.  · You develop a rash.  · You have trouble breathing.  · You develop allergies.  MAKE SURE YOU:  · Understand these instructions.  · Will watch your condition.  · Will get help right away if you are not doing well or get worse.     This information is not intended to replace advice given to you by your health care provider. Make sure you discuss any questions you have with your health care provider.     Document Released: 09/12/2002 Document Revised: 05/03/2016 Document Reviewed: 05/22/2014  [employer] Interactive Patient Education ©2016 [employer]       Fall Prevention in Hospitals, Adult  As a hospital patient, your condition and the treatments you receive can increase your risk for falls. Some additional risk factors for falls in a hospital include:  · Being in an unfamiliar environment.  · Being on bed rest.  · Your surgery.  · Taking certain medicines.  · Your tubing requirements, such as intravenous (IV) therapy or catheters.  It is important that you learn how to decrease fall risks while at the hospital. Below are important tips that can help prevent falls.  SAFETY TIPS FOR PREVENTING FALLS  Talk about your risk of falling.  · Ask your health care  provider why you are at risk for falling. Is it your medicine, illness, tubing placement, or something else?  · Make a plan with your health care provider to keep you safe from falls.  · Ask your health care provider or pharmacist about side effects of your medicines. Some medicines can make you dizzy or affect your coordination.  Ask for help.  · Ask for help before getting out of bed. You may need to press your call button.  · Ask for assistance in getting safely to the toilet.  · Ask for a walker or cane to be put at your bedside. Ask that most of the side rails on your bed be placed up before your health care provider leaves the room.  · Ask family or friends to sit with you.  · Ask for things that are out of your reach, such as your glasses, hearing aids, telephone, bedside table, or call button.  Follow these tips to avoid falling:  · Stay lying or seated, rather than standing, while waiting for help.  · Wear rubber-soled slippers or shoes whenever you walk in the hospital.  · Avoid quick, sudden movements.  ¨ Change positions slowly.  ¨ Sit on the side of your bed before standing.  ¨ Stand up slowly and wait before you start to walk.  · Let your health care provider know if there is a spill on the floor.  · Pay careful attention to the medical equipment, electrical cords, and tubes around you.  · When you need help, use your call button by your bed or in the bathroom. Wait for one of your health care providers to help you.  · If you feel dizzy or unsure of your footing, return to bed and wait for assistance.  · Avoid being distracted by the TV, telephone, or another person in your room.  · Do not lean or support yourself on rolling objects, such as IV poles or bedside tables.     This information is not intended to replace advice given to you by your health care provider. Make sure you discuss any questions you have with your health care provider.     Document Released: 12/15/2001 Document Revised: 01/08/2016  Document Reviewed: 08/25/2013  [employer] Interactive Patient Education ©2016 [employer]       Surgical Site Infections FAQs  What is a Surgical Site Infection (SSI)?  A surgical site infection is an infection that occurs after surgery in the part of the body where the surgery took place. Most patients who have surgery do not develop an infection. However, infections develop in about 1 to 3 out of every 100 patients who have surgery.  Some of the common symptoms of a surgical site infection are:  · Redness and pain around the area where you had surgery  · Drainage of cloudy fluid from your surgical wound  · Fever  Can SSIs be treated?  Yes. Most surgical site infections can be treated with antibiotics. The antibiotic given to you depends on the bacteria (germs) causing the infection. Sometimes patients with SSIs also need another surgery to treat the infection.  What are some of the things that hospitals are doing to prevent SSIs?  To prevent SSIs, doctors, nurses, and other healthcare providers:  · Clean their hands and arms up to their elbows with an antiseptic agent just before the surgery.  · Clean their hands with soap and water or an alcohol-based hand rub before and after caring for each patient.  · May remove some of your hair immediately before your surgery using electric clippers if the hair is in the same area where the procedure will occur. They should not shave you with a razor.  · Wear special hair covers, masks, gowns, and gloves during surgery to keep the surgery area clean.  · Give you antibiotics before your surgery starts. In most cases, you should get antibiotics within 60 minutes before the surgery starts and the antibiotics should be stopped within 24 hours after surgery.  · Clean the skin at the site of your surgery with a special soap that kills germs.  What can I do to help prevent SSIs?  Before your surgery:  · Tell your doctor about other medical problems you may have. Health problems  such as allergies, diabetes, and obesity could affect your surgery and your treatment.  · Quit smoking. Patients who smoke get more infections. Talk to your doctor about how you can quit before your surgery.  · Do not shave near where you will have surgery. Shaving with a razor can irritate your skin and make it easier to develop an infection.  At the time of your surgery:  · Speak up if someone tries to shave you with a razor before surgery. Ask why you need to be shaved and talk with your surgeon if you have any concerns.  · Ask if you will get antibiotics before surgery.  After your surgery:  · Make sure that your healthcare providers clean their hands before examining you, either with soap and water or an alcohol-based hand rub.  · If you do not see your providers clean their hands, please ask them to do so.  · Family and friends who visit you should not touch the surgical wound or dressings.  · Family and friends should clean their hands with soap and water or an alcohol-based hand rub before and after visiting you. If you do not see them clean their hands, ask them to clean their hands.  What do I need to do when I go home from the hospital?  · Before you go home, your doctor or nurse should explain everything you need to know about taking care of your wound. Make sure you understand how to care for your wound before you leave the hospital.  · Always clean your hands before and after caring for your wound.  · Before you go home, make sure you know who to contact if you have questions or problems after you get home.  · If you have any symptoms of an infection, such as redness and pain at the surgery site, drainage, or fever, call your doctor immediately.  If you have additional questions, please ask your doctor or nurse.  Developed and co-sponsored by The Society for Healthcare Epidemiology of [place] (SHEA); Infectious Diseases Society of [place] (IDSA); American Hospital Association; Association for  Professionals in Infection Control and Epidemiology (APIC); Centers for Disease Control and Prevention (CDC); and The Joint Commission.     This information is not intended to replace advice given to you by your health care provider. Make sure you discuss any questions you have with your health care provider.     Document Released: 12/23/2014 Document Revised: 01/08/2016 Document Reviewed: 03/02/2016  [employer] Interactive Patient Education ©2016 [employer]     PATIENT/FAMILY/RESPONSIBLE PARTY VERBALIZES UNDERSTANDING OF ABOVE EDUCATION.  COPY OF PAIN SCALE GIVEN AND REVIEWED WITH VERBALIZED UNDERSTANDING.

## 2019-04-24 ENCOUNTER — HOSPITAL ENCOUNTER (OUTPATIENT)
Facility: HOSPITAL | Age: 69
Setting detail: HOSPITAL OUTPATIENT SURGERY
Discharge: HOME | End: 2019-04-24
Attending: OTOLARYNGOLOGY | Admitting: OTOLARYNGOLOGY

## 2019-04-24 ENCOUNTER — ANESTHESIA (OUTPATIENT)
Dept: PERIOP | Facility: HOSPITAL | Age: 69
End: 2019-04-24

## 2019-04-24 ENCOUNTER — ANESTHESIA EVENT (OUTPATIENT)
Dept: PERIOP | Facility: HOSPITAL | Age: 69
End: 2019-04-24

## 2019-04-24 VITALS
TEMPERATURE: 96.4 F | RESPIRATION RATE: 18 BRPM | OXYGEN SATURATION: 99 % | DIASTOLIC BLOOD PRESSURE: 69 MMHG | SYSTOLIC BLOOD PRESSURE: 137 MMHG | HEART RATE: 75 BPM

## 2019-04-24 DIAGNOSIS — D48.5 NEOPLASM OF UNCERTAIN BEHAVIOR OF SKIN OF NOSE: ICD-10-CM

## 2019-04-24 PROCEDURE — 25010000002 FENTANYL CITRATE (PF) 100 MCG/2ML SOLUTION: Performed by: NURSE ANESTHETIST, CERTIFIED REGISTERED

## 2019-04-24 PROCEDURE — 13152 CMPLX RPR E/N/E/L 2.6-7.5 CM: CPT | Performed by: OTOLARYNGOLOGY

## 2019-04-24 PROCEDURE — 11440 EXC FACE-MM B9+MARG 0.5 CM/<: CPT | Performed by: OTOLARYNGOLOGY

## 2019-04-24 PROCEDURE — 88305 TISSUE EXAM BY PATHOLOGIST: CPT | Performed by: OTOLARYNGOLOGY

## 2019-04-24 PROCEDURE — 11441 EXC FACE-MM B9+MARG 0.6-1 CM: CPT | Performed by: OTOLARYNGOLOGY

## 2019-04-24 PROCEDURE — 25010000002 PROPOFOL 10 MG/ML EMULSION: Performed by: NURSE ANESTHETIST, CERTIFIED REGISTERED

## 2019-04-24 PROCEDURE — 25010000002 ONDANSETRON PER 1 MG: Performed by: NURSE ANESTHETIST, CERTIFIED REGISTERED

## 2019-04-24 RX ORDER — OXYCODONE AND ACETAMINOPHEN 7.5; 325 MG/1; MG/1
2 TABLET ORAL EVERY 4 HOURS PRN
Status: DISCONTINUED | OUTPATIENT
Start: 2019-04-24 | End: 2019-04-24 | Stop reason: HOSPADM

## 2019-04-24 RX ORDER — ONDANSETRON 2 MG/ML
INJECTION INTRAMUSCULAR; INTRAVENOUS AS NEEDED
Status: DISCONTINUED | OUTPATIENT
Start: 2019-04-24 | End: 2019-04-24 | Stop reason: SURG

## 2019-04-24 RX ORDER — NALOXONE HCL 0.4 MG/ML
0.4 VIAL (ML) INJECTION AS NEEDED
Status: DISCONTINUED | OUTPATIENT
Start: 2019-04-24 | End: 2019-04-24 | Stop reason: HOSPADM

## 2019-04-24 RX ORDER — SODIUM CHLORIDE 0.9 % (FLUSH) 0.9 %
1-10 SYRINGE (ML) INJECTION AS NEEDED
Status: DISCONTINUED | OUTPATIENT
Start: 2019-04-24 | End: 2019-04-24 | Stop reason: HOSPADM

## 2019-04-24 RX ORDER — MEPERIDINE HYDROCHLORIDE 25 MG/ML
12.5 INJECTION INTRAMUSCULAR; INTRAVENOUS; SUBCUTANEOUS
Status: DISCONTINUED | OUTPATIENT
Start: 2019-04-24 | End: 2019-04-24 | Stop reason: HOSPADM

## 2019-04-24 RX ORDER — METOCLOPRAMIDE HYDROCHLORIDE 5 MG/ML
5 INJECTION INTRAMUSCULAR; INTRAVENOUS
Status: DISCONTINUED | OUTPATIENT
Start: 2019-04-24 | End: 2019-04-24 | Stop reason: HOSPADM

## 2019-04-24 RX ORDER — ONDANSETRON 2 MG/ML
4 INJECTION INTRAMUSCULAR; INTRAVENOUS ONCE AS NEEDED
Status: DISCONTINUED | OUTPATIENT
Start: 2019-04-24 | End: 2019-04-24 | Stop reason: HOSPADM

## 2019-04-24 RX ORDER — FENTANYL CITRATE 50 UG/ML
25 INJECTION, SOLUTION INTRAMUSCULAR; INTRAVENOUS AS NEEDED
Status: DISCONTINUED | OUTPATIENT
Start: 2019-04-24 | End: 2019-04-24 | Stop reason: HOSPADM

## 2019-04-24 RX ORDER — ONDANSETRON 4 MG/1
4 TABLET, FILM COATED ORAL ONCE AS NEEDED
Status: DISCONTINUED | OUTPATIENT
Start: 2019-04-24 | End: 2019-04-24 | Stop reason: HOSPADM

## 2019-04-24 RX ORDER — HYDROCODONE BITARTRATE AND ACETAMINOPHEN 5; 325 MG/1; MG/1
1 TABLET ORAL EVERY 4 HOURS PRN
Qty: 8 TABLET | Refills: 0 | Status: SHIPPED | OUTPATIENT
Start: 2019-04-24 | End: 2019-11-20

## 2019-04-24 RX ORDER — MAGNESIUM HYDROXIDE 1200 MG/15ML
LIQUID ORAL AS NEEDED
Status: DISCONTINUED | OUTPATIENT
Start: 2019-04-24 | End: 2019-04-24 | Stop reason: HOSPADM

## 2019-04-24 RX ORDER — SODIUM CHLORIDE, SODIUM LACTATE, POTASSIUM CHLORIDE, CALCIUM CHLORIDE 600; 310; 30; 20 MG/100ML; MG/100ML; MG/100ML; MG/100ML
100 INJECTION, SOLUTION INTRAVENOUS CONTINUOUS
Status: DISCONTINUED | OUTPATIENT
Start: 2019-04-24 | End: 2019-04-24 | Stop reason: HOSPADM

## 2019-04-24 RX ORDER — LIDOCAINE HYDROCHLORIDE AND EPINEPHRINE 10; 10 MG/ML; UG/ML
INJECTION, SOLUTION INFILTRATION; PERINEURAL AS NEEDED
Status: DISCONTINUED | OUTPATIENT
Start: 2019-04-24 | End: 2019-04-24 | Stop reason: HOSPADM

## 2019-04-24 RX ORDER — OXYCODONE AND ACETAMINOPHEN 10; 325 MG/1; MG/1
1 TABLET ORAL ONCE AS NEEDED
Status: DISCONTINUED | OUTPATIENT
Start: 2019-04-24 | End: 2019-04-24 | Stop reason: HOSPADM

## 2019-04-24 RX ORDER — HYDROCODONE BITARTRATE AND ACETAMINOPHEN 5; 325 MG/1; MG/1
1 TABLET ORAL ONCE AS NEEDED
Status: DISCONTINUED | OUTPATIENT
Start: 2019-04-24 | End: 2019-04-24 | Stop reason: HOSPADM

## 2019-04-24 RX ORDER — IPRATROPIUM BROMIDE AND ALBUTEROL SULFATE 2.5; .5 MG/3ML; MG/3ML
3 SOLUTION RESPIRATORY (INHALATION) ONCE AS NEEDED
Status: DISCONTINUED | OUTPATIENT
Start: 2019-04-24 | End: 2019-04-24 | Stop reason: HOSPADM

## 2019-04-24 RX ORDER — LABETALOL HYDROCHLORIDE 5 MG/ML
5 INJECTION, SOLUTION INTRAVENOUS
Status: DISCONTINUED | OUTPATIENT
Start: 2019-04-24 | End: 2019-04-24 | Stop reason: HOSPADM

## 2019-04-24 RX ORDER — SODIUM CHLORIDE, SODIUM LACTATE, POTASSIUM CHLORIDE, CALCIUM CHLORIDE 600; 310; 30; 20 MG/100ML; MG/100ML; MG/100ML; MG/100ML
1000 INJECTION, SOLUTION INTRAVENOUS CONTINUOUS
Status: DISCONTINUED | OUTPATIENT
Start: 2019-04-24 | End: 2019-04-24 | Stop reason: HOSPADM

## 2019-04-24 RX ORDER — IBUPROFEN 600 MG/1
600 TABLET ORAL ONCE AS NEEDED
Status: DISCONTINUED | OUTPATIENT
Start: 2019-04-24 | End: 2019-04-24 | Stop reason: HOSPADM

## 2019-04-24 RX ORDER — SODIUM CHLORIDE 0.9 % (FLUSH) 0.9 %
3 SYRINGE (ML) INJECTION EVERY 12 HOURS SCHEDULED
Status: DISCONTINUED | OUTPATIENT
Start: 2019-04-24 | End: 2019-04-24 | Stop reason: HOSPADM

## 2019-04-24 RX ORDER — LIDOCAINE HYDROCHLORIDE 20 MG/ML
INJECTION, SOLUTION INFILTRATION; PERINEURAL AS NEEDED
Status: DISCONTINUED | OUTPATIENT
Start: 2019-04-24 | End: 2019-04-24 | Stop reason: SURG

## 2019-04-24 RX ORDER — FENTANYL CITRATE 50 UG/ML
INJECTION, SOLUTION INTRAMUSCULAR; INTRAVENOUS AS NEEDED
Status: DISCONTINUED | OUTPATIENT
Start: 2019-04-24 | End: 2019-04-24 | Stop reason: SURG

## 2019-04-24 RX ORDER — SODIUM CHLORIDE 0.9 % (FLUSH) 0.9 %
3 SYRINGE (ML) INJECTION AS NEEDED
Status: DISCONTINUED | OUTPATIENT
Start: 2019-04-24 | End: 2019-04-24 | Stop reason: HOSPADM

## 2019-04-24 RX ORDER — PROPOFOL 10 MG/ML
VIAL (ML) INTRAVENOUS AS NEEDED
Status: DISCONTINUED | OUTPATIENT
Start: 2019-04-24 | End: 2019-04-24 | Stop reason: SURG

## 2019-04-24 RX ADMIN — LIDOCAINE HYDROCHLORIDE 50 MG: 20 INJECTION, SOLUTION INFILTRATION; PERINEURAL at 09:02

## 2019-04-24 RX ADMIN — HYDROCODONE BITARTRATE AND ACETAMINOPHEN 1 TABLET: 5; 325 TABLET ORAL at 10:30

## 2019-04-24 RX ADMIN — ONDANSETRON HYDROCHLORIDE 4 MG: 2 SOLUTION INTRAMUSCULAR; INTRAVENOUS at 09:05

## 2019-04-24 RX ADMIN — PROPOFOL 20 MG: 10 INJECTION, EMULSION INTRAVENOUS at 09:25

## 2019-04-24 RX ADMIN — SODIUM CHLORIDE, POTASSIUM CHLORIDE, SODIUM LACTATE AND CALCIUM CHLORIDE 1000 ML: 600; 310; 30; 20 INJECTION, SOLUTION INTRAVENOUS at 06:50

## 2019-04-24 RX ADMIN — PROPOFOL 20 MG: 10 INJECTION, EMULSION INTRAVENOUS at 09:19

## 2019-04-24 RX ADMIN — FENTANYL CITRATE 100 MCG: 50 INJECTION, SOLUTION INTRAMUSCULAR; INTRAVENOUS at 08:59

## 2019-04-24 RX ADMIN — PROPOFOL 30 MG: 10 INJECTION, EMULSION INTRAVENOUS at 09:03

## 2019-04-24 RX ADMIN — PROPOFOL 20 MG: 10 INJECTION, EMULSION INTRAVENOUS at 09:12

## 2019-04-24 NOTE — ANESTHESIA PREPROCEDURE EVALUATION
Anesthesia Evaluation     Patient summary reviewed and Nursing notes reviewed   no history of anesthetic complications:  NPO Solid Status: > 8 hours  NPO Liquid Status: > 8 hours           Airway   Mallampati: II  TM distance: >3 FB  Neck ROM: full  No difficulty expected  Dental      Pulmonary    (+) asthma,   (-) not a smoker  Cardiovascular   Exercise tolerance: good (4-7 METS)    ECG reviewed    (+) hypertension, hyperlipidemia,       Neuro/Psych  (+) CVA (3 years ago, right hand weakness), psychiatric history Anxiety,     (-) seizures, TIA  GI/Hepatic/Renal/Endo    (+)   liver disease (hepatic cyst),   (-) no renal disease, diabetes    Musculoskeletal     (+) neck pain,   Abdominal    Substance History      OB/GYN          Other   (+) arthritis                     Anesthesia Plan    ASA 2     MAC     intravenous induction   Anesthetic plan, all risks, benefits, and alternatives have been provided, discussed and informed consent has been obtained with: patient.

## 2019-04-24 NOTE — OP NOTE
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    Approximately 4mL 1% Xylocaine with epinephrine was injected in the planned excision site in the right nasal ala.  Excision was accomplished with a #15 blade in an elliptical fashion without difficulty.  The excision was approximately 1.5 cm  x 0.6 cm.  The lesion was approximately 0.4 cm x 0.3 cm.  The margin was 1 mm. Upon excision the specimen was marked and submitted for permanent pathologic examination.    Extensive and wide undermining was performed with curved iris scissors and double prong skin hooks.  This was performed in order to facilitate closure and to preserve normal anatomic relationships.  Minimal bleeding was encountered which was controlled with electrocautery and low settings.  The incision was reapproximated utilizing interrupted 4-0 Monocryl subcutaneously and interrupted 5-0 nylon to reapproximate the epidermis.  Bactroban ointment was applied.  Attention was then turned to the opposite ala.     Approximately 4mL 1% Xylocaine with epinephrine was injected in the planned  excision site in the left nasal ala.  Excision was accomplished with a #15 blade in an elliptical fashion without difficulty.  The excision was approximately 1.2 cm  x 0.4 cm.  The lesion was approximately 0.3 cm x 0.3 cm.  The margin was 0.5 mm. Upon excision the specimen was marked and submitted for permanent pathologic examination.    Extensive and wide undermining was performed with curved iris scissors and double prong skin hooks.  This was performed in order to facilitate closure and to preserve normal anatomic relationships.  Minimal bleeding was encountered which was controlled with electrocautery and low settings.  The incision was reapproximated utilizing interrupted 4-0 Monocryl subcutaneously and interrupted 5-0 nylon to reapproximate the epidermis.  Bactroban ointment was applied and the procedure terminated.    The patient tolerated the procedure well without complications and was transported to the postanesthesia care unit in stable condition.    SPECIMENS:  A: Right nasal alar neoplasm  B: Left nasal alar neoplasm    COMPLICATIONS: NONE    ESTIMATED BLOOD LOSS:  Minimal    Axel Jiménez MD  4/24/2019

## 2019-04-24 NOTE — DISCHARGE INSTRUCTIONS
YOUR NEXT PAIN MEDICATION IS DUE AT______________        Moderate Conscious Sedation, Adult, Care After  Refer to this sheet in the next few weeks. These instructions provide you with information on caring for yourself after your procedure. Your health care provider may also give you more specific instructions. Your treatment has been planned according to current medical practices, but problems sometimes occur. Call your health care provider if you have any problems or questions after your procedure.  WHAT TO EXPECT AFTER THE PROCEDURE    After your procedure:  · You may feel sleepy, clumsy, and have poor balance for several hours.  · Vomiting may occur if you eat too soon after the procedure.  HOME CARE INSTRUCTIONS  · Do not participate in any activities where you could become injured for at least 24 hours. Do not:  ¨ Drive.  ¨ Swim.  ¨ Ride a bicycle.  ¨ Operate heavy machinery.  ¨ Cook.  ¨ Use power tools.  ¨ Climb ladders.  ¨ Work from a high place.  · Do not make important decisions or sign legal documents until you are improved.  · If you vomit, drink water, juice, or soup when you can drink without vomiting. Make sure you have little or no nausea before eating solid foods.  · Only take over-the-counter or prescription medicines for pain, discomfort, or fever as directed by your health care provider.  · Make sure you and your family fully understand everything about the medicines given to you, including what side effects may occur.  · You should not drink alcohol, take sleeping pills, or take medicines that cause drowsiness for at least 24 hours.  · If you smoke, do not smoke without supervision.  · If you are feeling better, you may resume normal activities 24 hours after you were sedated.  · Keep all appointments with your health care provider.  SEEK MEDICAL CARE IF:  · Your skin is pale or bluish in color.  · You continue to feel nauseous or vomit.  · Your pain is getting worse and is not helped by  medicine.  · You have bleeding or swelling.  · You are still sleepy or feeling clumsy after 24 hours.  SEEK IMMEDIATE MEDICAL CARE IF:  · You develop a rash.  · You have difficulty breathing.  · You develop any type of allergic problem.  · You have a fever.  MAKE SURE YOU:  · Understand these instructions.  · Will watch your condition.  · Will get help right away if you are not doing well or get worse.     This information is not intended to replace advice given to you by your health care provider. Make sure you discuss any questions you have with your health care provider.     Document Released: 10/08/2014 Document Revised: 01/08/2016 Document Reviewed: 10/08/2014  [employer] Interactive Patient Education ©2016 [employer]         CALL YOUR PHYSICIAN IF YOU EXPERIENCE  INCREASED PAIN NOT HELPED BY YOUR PAIN MEDICATION.        Fall Prevention in the Home      Falls can cause injuries. They can happen to people of all ages. There are many things you can do to make your home safe and to help prevent falls.    WHAT CAN I DO ON THE OUTSIDE OF MY HOME?  · Regularly fix the edges of walkways and driveways and fix any cracks.  · Remove anything that might make you trip as you walk through a door, such as a raised step or threshold.  · Trim any bushes or trees on the path to your home.  · Use bright outdoor lighting.  · Clear any walking paths of anything that might make someone trip, such as rocks or tools.  · Regularly check to see if handrails are loose or broken. Make sure that both sides of any steps have handrails.  · Any raised decks and porches should have guardrails on the edges.  · Have any leaves, snow, or ice cleared regularly.  · Use sand or salt on walking paths during winter.  · Clean up any spills in your garage right away. This includes oil or grease spills.  WHAT CAN I DO IN THE BATHROOM?    · Use night lights.  · Install grab bars by the toilet and in the tub and shower. Do not use towel bars as grab  bars.  · Use non-skid mats or decals in the tub or shower.  · If you need to sit down in the shower, use a plastic, non-slip stool.  · Keep the floor dry. Clean up any water that spills on the floor as soon as it happens.  · Remove soap buildup in the tub or shower regularly.  · Attach bath mats securely with double-sided non-slip rug tape.  · Do not have throw rugs and other things on the floor that can make you trip.  WHAT CAN I DO IN THE BEDROOM?  · Use night lights.  · Make sure that you have a light by your bed that is easy to reach.  · Do not use any sheets or blankets that are too big for your bed. They should not hang down onto the floor.  · Have a firm chair that has side arms. You can use this for support while you get dressed.  · Do not have throw rugs and other things on the floor that can make you trip.  WHAT CAN I DO IN THE KITCHEN?  · Clean up any spills right away.  · Avoid walking on wet floors.  · Keep items that you use a lot in easy-to-reach places.  · If you need to reach something above you, use a strong step stool that has a grab bar.  · Keep electrical cords out of the way.  · Do not use floor polish or wax that makes floors slippery. If you must use wax, use non-skid floor wax.  · Do not have throw rugs and other things on the floor that can make you trip.  WHAT CAN I DO WITH MY STAIRS?  · Do not leave any items on the stairs.  · Make sure that there are handrails on both sides of the stairs and use them. Fix handrails that are broken or loose. Make sure that handrails are as long as the stairways.  · Check any carpeting to make sure that it is firmly attached to the stairs. Fix any carpet that is loose or worn.  · Avoid having throw rugs at the top or bottom of the stairs. If you do have throw rugs, attach them to the floor with carpet tape.  · Make sure that you have a light switch at the top of the stairs and the bottom of the stairs. If you do not have them, ask someone to add them for  you.  WHAT ELSE CAN I DO TO HELP PREVENT FALLS?  · Wear shoes that:  ¨ Do not have high heels.  ¨ Have rubber bottoms.  ¨ Are comfortable and fit you well.  ¨ Are closed at the toe. Do not wear sandals.  · If you use a stepladder:  ¨ Make sure that it is fully opened. Do not climb a closed stepladder.  ¨ Make sure that both sides of the stepladder are locked into place.  ¨ Ask someone to hold it for you, if possible.  · Clearly kristine and make sure that you can see:  ¨ Any grab bars or handrails.  ¨ First and last steps.  ¨ Where the edge of each step is.  · Use tools that help you move around (mobility aids) if they are needed. These include:  ¨ Canes.  ¨ Walkers.  ¨ Scooters.  ¨ Crutches.  · Turn on the lights when you go into a dark area. Replace any light bulbs as soon as they burn out.  · Set up your furniture so you have a clear path. Avoid moving your furniture around.  · If any of your floors are uneven, fix them.  · If there are any pets around you, be aware of where they are.  · Review your medicines with your doctor. Some medicines can make you feel dizzy. This can increase your chance of falling.  Ask your doctor what other things that you can do to help prevent falls.     This information is not intended to replace advice given to you by your health care provider. Make sure you discuss any questions you have with your health care provider.     Document Released: 10/14/2010 Document Revised: 05/03/2016 Document Reviewed: 01/22/2016  [employer]vier Interactive Patient Education ©2016 [employer]     PATIENT/FAMILY/RESPONSIBLE PARTY VERBALIZES UNDERSTANDING OF ABOVE EDUCATION.  COPY OF PAIN SCALE GIVEN AND REVIEWED WITH VERBALIZED UNDERSTANDING.

## 2019-04-24 NOTE — ANESTHESIA POSTPROCEDURE EVALUATION
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 [D48.5])    Surgeon:  Axel Jiménez MD Provider:  Rafael Corrales CRNA    Anesthesia Type:  MAC ASA Status:  2          Anesthesia Type: MAC  Last vitals  BP   144/77 (04/24/19 0633)   Temp   97.5 °F (36.4 °C) (04/24/19 0633)   Pulse   76 (04/24/19 0829)   Resp   16 (04/24/19 0829)     SpO2   100 % (04/24/19 0829)     Post Anesthesia Care and Evaluation    Patient location during evaluation: PHASE II  Patient participation: complete - patient participated  Level of consciousness: awake  Pain score: 1  Pain management: adequate  Airway patency: patent  Anesthetic complications: No anesthetic complications  PONV Status: none  Cardiovascular status: acceptable  Respiratory status: acceptable  Hydration status: acceptable

## 2019-04-25 LAB
CYTO UR: NORMAL
LAB AP CASE REPORT: NORMAL
PATH REPORT.FINAL DX SPEC: NORMAL
PATH REPORT.GROSS SPEC: NORMAL

## 2019-05-01 ENCOUNTER — OFFICE VISIT (OUTPATIENT)
Dept: OTOLARYNGOLOGY | Facility: CLINIC | Age: 69
End: 2019-05-01

## 2019-05-01 DIAGNOSIS — D22.39 MELANOCYTIC NEVUS OF FACE, OTHER LOCATION: Primary | ICD-10-CM

## 2019-05-01 PROCEDURE — 99024 POSTOP FOLLOW-UP VISIT: CPT | Performed by: OTOLARYNGOLOGY

## 2019-05-01 NOTE — PROGRESS NOTES
Procedure   Suture Removal  Date/Time: 5/1/2019 10:22 AM  Performed by: Estephanie Pearson RN  Authorized by: Axel Jiménez MD   Consent: Verbal consent obtained.  Consent given by: patient  Patient identity confirmed: verbally with patient  Body area: head/neck  Location details: nose  Comments: Patient presents for suture removal. The incisions are well-approximated with mild redness. Patient notified of path.

## 2019-05-23 ENCOUNTER — HOSPITAL ENCOUNTER (OUTPATIENT)
Dept: CT IMAGING | Facility: HOSPITAL | Age: 69
Discharge: HOME | End: 2019-05-23
Admitting: INTERNAL MEDICINE

## 2019-05-23 ENCOUNTER — TELEPHONE (OUTPATIENT)
Dept: GASTROENTEROLOGY | Facility: CLINIC | Age: 69
End: 2019-05-23

## 2019-05-23 DIAGNOSIS — K86.9 PANCREATIC LESION: ICD-10-CM

## 2019-05-23 LAB — CREAT BLDA-MCNC: 1 MG/DL (ref 0.6–1.3)

## 2019-05-23 PROCEDURE — 74178 CT ABD&PLV WO CNTR FLWD CNTR: CPT

## 2019-05-23 PROCEDURE — 82565 ASSAY OF CREATININE: CPT

## 2019-05-23 PROCEDURE — 25010000002 IOPAMIDOL 61 % SOLUTION: Performed by: INTERNAL MEDICINE

## 2019-05-23 PROCEDURE — 0 IOHEXOL 300 MG/ML SOLUTION: Performed by: INTERNAL MEDICINE

## 2019-05-23 RX ADMIN — IOPAMIDOL 100 ML: 612 INJECTION, SOLUTION INTRAVENOUS at 10:29

## 2019-05-23 RX ADMIN — IOHEXOL 50 ML: 300 INJECTION, SOLUTION INTRAVENOUS at 10:29

## 2019-05-23 NOTE — TELEPHONE ENCOUNTER
Impression     1. Stable pancreatic lesions when compared to an exam from June 2017. 2  years of stability supports benign etiology.  2. Multiple stable hepatic cysts.  3. Possible right ovarian cyst for which nonemergent follow-up pelvic  ultrasound is recommended.  4. Colonic diverticulosis without evidence of acute diverticulitis.  5. Atherosclerotic disease.       These inform patient that her repeat CT scan again shows stability.  There is no increase in size of the pancreatic lesion.  The radiologist says this supports that it is benign.     Let's have her RTC in one year to see me to touch base.    Fariha Weber MD

## 2019-05-23 NOTE — TELEPHONE ENCOUNTER
Spoke to pt re: results-she VU. I offered to transfer pt to schedule OV but she states she won't remember this far out. I placed her in recall for OV for next year-advised pt to call us at that time to schedule.

## 2019-11-20 ENCOUNTER — OFFICE VISIT (OUTPATIENT)
Dept: NEUROLOGY | Facility: CLINIC | Age: 69
End: 2019-11-20

## 2019-11-20 VITALS
HEART RATE: 91 BPM | OXYGEN SATURATION: 98 % | BODY MASS INDEX: 24.39 KG/M2 | HEIGHT: 59 IN | WEIGHT: 121 LBS | DIASTOLIC BLOOD PRESSURE: 76 MMHG | SYSTOLIC BLOOD PRESSURE: 122 MMHG

## 2019-11-20 DIAGNOSIS — R51.9 HEADACHE DISORDER: ICD-10-CM

## 2019-11-20 DIAGNOSIS — I63.312 CEREBROVASCULAR ACCIDENT (CVA) DUE TO THROMBOSIS OF LEFT MIDDLE CEREBRAL ARTERY (HCC): ICD-10-CM

## 2019-11-20 DIAGNOSIS — I10 ESSENTIAL HYPERTENSION: ICD-10-CM

## 2019-11-20 DIAGNOSIS — G47.33 OBSTRUCTIVE SLEEP APNEA: Primary | ICD-10-CM

## 2019-11-20 DIAGNOSIS — G47.34 NOCTURNAL HYPOXIA: ICD-10-CM

## 2019-11-20 PROCEDURE — 99214 OFFICE O/P EST MOD 30 MIN: CPT | Performed by: PHYSICIAN ASSISTANT

## 2019-11-20 RX ORDER — NORTRIPTYLINE HYDROCHLORIDE 10 MG/1
60 CAPSULE ORAL NIGHTLY
Qty: 120 CAPSULE | Refills: 0 | Status: SHIPPED | OUTPATIENT
Start: 2019-11-20 | End: 2020-03-16 | Stop reason: SDUPTHER

## 2019-11-20 NOTE — PROGRESS NOTES
Neurology Progress Note      Chief Complaint:    Headache disorder    Subjective     Subjective:  This is a 69-year-old female who has a longstanding history of headache disorder for more than 5 years.  We have seen her remotely for history of left basal ganglia CVA treated with TPA back in April 2017.  She has been lost to follow-up since 12/7/2017.  The patient states that she had previously seen Dr. Lynn in neurology about 5 years ago in [place], [place], when she was placed on nortriptyline 40 mg nightly for headache.  She states that at that time, this resolved her headaches.  Unfortunately, about 6 months ago, she began experiencing headache nightly when she lies down.  This occurs when she lays down to sleep she does not have headache, however, after sleeping for approximately 1-2 hours, she awakes with headache.  In order for it to resolve, she has to get up and sit in her recliner fully awake.  Headache eventually resolves and then she returns to sleep, however, generally wakes up in the morning with persistent headache and fatigue.  There is no associated aura with a headache.  It is a global headache.  She also has chronic neck pain.    The patient admits a previous diagnosis of obstructive sleep apnea diagnosed approximately 8 years ago.  The patient states she wore supplemental oxygen and CPAP therapy for approximately 1 year, but could not tolerate the mask and therapy.  She has been noncompliant in this therapy for more than 6 years.    Her family physician has been managing secondary stroke prophylaxis including hypertension, dyslipidemia and antiplatelet therapy.      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 Excision of neoplasm of uncertain origin of the left nasal ala with complex closure  ;  Surgeon: Axel Jiménez MD;  Location:  [facility] OR;  Service: ENT   • TUBAL ABDOMINAL LIGATION       Family History   Problem Relation Age of Onset   • COPD Mother    • Cancer Sister    • Stroke Brother    • Stroke Maternal Grandfather      Social History     Tobacco Use   • Smoking status: Never Smoker   • Smokeless tobacco: Never Used   Substance Use Topics   • Alcohol use: Yes     Comment: socially   • Drug use: No       Medications:  Current Outpatient Medications   Medication Sig Dispense Refill   • atorvastatin (LIPITOR) 10 MG tablet Take 10 mg by mouth Daily.     • CARTIA  MG 24 hr capsule Take 120 mg by mouth Daily.     • clopidogrel (PLAVIX) 75 MG tablet Take 75 mg by mouth Daily.     • losartan (COZAAR) 25 MG tablet Take 25 mg by mouth Daily.     • montelukast (SINGULAIR) 10 MG tablet Take 10 mg by mouth Every Night.     • nortriptyline (PAMELOR) 10 MG capsule Take 40 mg by mouth Every Night.       No current facility-administered medications for this visit.        Allergies:    Patient has no known allergies.    Review of Systems:   -A 14 point review of systems is completed and is negative.      Objective      Vital Signs  Heart Rate:  [91] 91  BP: (122)/(76) 122/76    Physical Exam:    General Exam:  Head:  Normocephalic, atraumatic.  HEENT: PERRLA.  Full EOM.  Neck:  No lymphadenopathy, thyromegaly or bruit.  Cardiac:  Regular rate and rhythm.  Normal S1, S2.  No murmur, rub or gallop.  Lungs:  Clear to auscultation bilaterally.  No wheeze, rales or rhonchi.  Abdomen:  Non-tender, Non-distended.  Bowel sounds normoactive.  Extremities: Full peripheral pulses.   No clubbing, cyanosis or edema.  Skin: No ulceration, breakdown or rash.      Neurologic Exam:  Mental Status:    -Awake. Alert. Oriented to person, place & time.  -No word finding difficulties.  -No aphasia.  -No dysarthria.  -Follows simple commands.     CN II:  Full visual fields with confrontation.  Pupils equally reactive to light.  CN III, IV, VI:  Extraocular muscles function intact with no nystagmus.  CN V:  Facial sensory is symmetric.  CN VII:  Facial motor symmetric.  CN VIII:  Gross hearing intact bilaterally.  CN IX/X:  Palate elevates symmetrically.  CN XI:  Shoulder shrug symmetric.  CN XII:  Tongue is midline on protrusion.     Sensory:  -Intact to light touch, pinprick BUE (C5-T1) and BLE (L2-S1).     Gait  -No signs of ataxia  -ambulates unassisted       Results Review:    I reviewed the patient's new clinical results and findings.      No components found for: A1C  Lab Results   Component Value Date    HDL 55 04/10/2017    LDL 81 04/10/2017     No components found for: B12  No results found for: TSH    Assessment/Plan     Impression:  1.  Headache disorder  2.  Obstructive sleep apnea, untreated  3.  Probable nocturnal hypoxia  4.  Remote left basal ganglia CVA, April 2017  5.  Hypertension, essential  6.  Dyslipidemia, mixed      Plan:  1.  I reviewed the clinical findings with the patient in detail.  As the patient has a known history of severe obstructive sleep apnea diagnosed about 8 years ago and she has been noncompliant with CPAP for more than 6 years; and because the patient has symptoms where she goes to bed, but awakes after approximately 2 hours of sleep with severe headache, has to be alert for 2 hours before the headache resolves, returns to sleep, and then awakes again in the mornings unrested and with headache, I am suspicious that she is having nocturnal hypoxia secondary to obstructive and/or central sleep apnea.    Therefore, I am setting the patient up for overnight  polysomnography as a split-night study with CPAP to titrate therapy appropriately and assess as to whether or not she needs supplemental oxygenation and/or pap therapy.    2.  In the interim, she can increase nortriptyline to as much as 60 mg nightly, however, I explained to her that I do not anticipate this to mitigate her headaches adequately or fully.    3.  I have explained her that I recommend that we not try multiple medications that potentially increase sedation, decreased respiratory drive and potentially worsened apnea or hypopnea and hypoxia overnight as this could increase her mortality.  Patient seems somewhat indifferent to this, but does voiced understanding and agreement to this.    I will see the patient back after sleep studies completed which time we will discuss further available treatment options.  She voices understanding and agreement to the plan of care.  We reviewed pertinent diagnostic studies and the potential risks and benefits of the prescribed regimen of treatment.    We discussed compliance of the prescribed treatment regimen and instructions on medication, therapy, physical activity, etc. and potential for improvement and impact these have on their healing/recovery and risk reduction in the future.    I spent greater than 25 minutes in direct face to face contact with the patient with greater than 50% of the time being spent in education and counseling.          Saul Ovalles PA-C  11/20/19  1:16 PM

## 2019-12-11 ENCOUNTER — HOSPITAL ENCOUNTER (OUTPATIENT)
Dept: SLEEP MEDICINE | Facility: HOSPITAL | Age: 69
Discharge: HOME | End: 2019-12-11
Admitting: PHYSICIAN ASSISTANT

## 2019-12-11 VITALS
HEART RATE: 88 BPM | DIASTOLIC BLOOD PRESSURE: 88 MMHG | HEIGHT: 59 IN | SYSTOLIC BLOOD PRESSURE: 149 MMHG | WEIGHT: 121 LBS | RESPIRATION RATE: 18 BRPM | OXYGEN SATURATION: 98 % | BODY MASS INDEX: 24.39 KG/M2

## 2019-12-11 DIAGNOSIS — R51.9 HEADACHE DISORDER: ICD-10-CM

## 2019-12-11 DIAGNOSIS — I63.312 CEREBROVASCULAR ACCIDENT (CVA) DUE TO THROMBOSIS OF LEFT MIDDLE CEREBRAL ARTERY (HCC): ICD-10-CM

## 2019-12-11 DIAGNOSIS — G47.34 NOCTURNAL HYPOXIA: ICD-10-CM

## 2019-12-11 DIAGNOSIS — I10 ESSENTIAL HYPERTENSION: ICD-10-CM

## 2019-12-11 DIAGNOSIS — G47.33 OBSTRUCTIVE SLEEP APNEA: ICD-10-CM

## 2019-12-11 PROCEDURE — 95810 POLYSOM 6/> YRS 4/> PARAM: CPT

## 2019-12-11 PROCEDURE — 95810 POLYSOM 6/> YRS 4/> PARAM: CPT | Performed by: PSYCHIATRY & NEUROLOGY

## 2020-03-16 ENCOUNTER — OFFICE VISIT (OUTPATIENT)
Dept: NEUROLOGY | Facility: CLINIC | Age: 70
End: 2020-03-16

## 2020-03-16 VITALS
HEART RATE: 101 BPM | OXYGEN SATURATION: 99 % | SYSTOLIC BLOOD PRESSURE: 152 MMHG | DIASTOLIC BLOOD PRESSURE: 84 MMHG | WEIGHT: 132 LBS | HEIGHT: 59 IN | BODY MASS INDEX: 26.61 KG/M2

## 2020-03-16 DIAGNOSIS — M79.18 CERVICAL MYOFASCIAL PAIN SYNDROME: ICD-10-CM

## 2020-03-16 DIAGNOSIS — R51.9 HEADACHE DISORDER: Primary | ICD-10-CM

## 2020-03-16 PROCEDURE — 99213 OFFICE O/P EST LOW 20 MIN: CPT | Performed by: PHYSICIAN ASSISTANT

## 2020-03-16 RX ORDER — ALBUTEROL SULFATE 90 UG/1
2 AEROSOL, METERED RESPIRATORY (INHALATION) EVERY 6 HOURS PRN
COMMUNITY
Start: 2020-01-02

## 2020-03-16 RX ORDER — NORTRIPTYLINE HYDROCHLORIDE 75 MG/1
75 CAPSULE ORAL NIGHTLY
Qty: 30 CAPSULE | Refills: 0 | Status: SHIPPED | OUTPATIENT
Start: 2020-03-16 | End: 2022-03-08 | Stop reason: ALTCHOICE

## 2020-03-16 NOTE — PROGRESS NOTES
Neurology Progress Note      Chief Complaint:    Headache disorder  Chronic neck pain    Subjective     Subjective:  This is a 69-year-old female who presents today for follow-up of chronic headache disorder.  At the previous encounter, we recommended increasing her nortriptyline to 60 mg nightly.  She states it took care of her headaches and allowed her to rest better at night, however, she ran out of the medication and has been back on 40 mg daily which is not helping her headaches.  She does have chronic neck pain, for which, she has been in physical therapy in the past, however, has not been this increased history.    The patient also underwent overnight polysomnography after the last encounter which did not demonstrate obstructive sleep apnea as had been previously diagnosed with the patient.  She continues to have difficulties initiating and maintaining sleep.      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 Excision of neoplasm of uncertain origin of the left nasal ala with complex closure  ;  Surgeon: Axel Jiménez MD;  Location: [facility] OR;  Service: ENT   • TUBAL ABDOMINAL LIGATION       Family History   Problem Relation Age of Onset   • COPD Mother    • Cancer Sister    • Stroke Brother    • Stroke Maternal Grandfather      Social History     Tobacco Use   • Smoking status: Never Smoker   • Smokeless tobacco: Never Used   Substance Use Topics   • Alcohol use: Yes      Comment: socially   • Drug use: No       Medications:  Current Outpatient Medications   Medication Sig Dispense Refill   • albuterol sulfate  (90 Base) MCG/ACT inhaler Inhale 2 puffs Every 6 (Six) Hours As Needed.     • atorvastatin (LIPITOR) 10 MG tablet Take 10 mg by mouth Daily.     • CARTIA  MG 24 hr capsule Take 120 mg by mouth Daily.     • clopidogrel (PLAVIX) 75 MG tablet Take 75 mg by mouth Daily.     • losartan (COZAAR) 25 MG tablet Take 25 mg by mouth Daily.     • montelukast (SINGULAIR) 10 MG tablet Take 10 mg by mouth Every Night.     • nortriptyline (PAMELOR) 10 MG capsule Take 6 capsules by mouth Every Night. 120 capsule 0     No current facility-administered medications for this visit.        Allergies:    Patient has no known allergies.    Review of Systems:   -A 14 point review of systems is completed and is negative.      Objective      Vital Signs  Heart Rate:  [101] 101  BP: (152)/(84) 152/84    Physical Exam:    General Exam:  Head:  Normocephalic, atraumatic.  HEENT: PERRLA.  Full EOM.  Neck:  No lymphadenopathy, thyromegaly or bruit.  Cardiac:  Regular rate and rhythm.  Normal S1, S2.  No murmur, rub or gallop.  Lungs:  Clear to auscultation bilaterally.  No wheeze, rales or rhonchi.  Abdomen:  Non-tender, Non-distended.  Bowel sounds normoactive.  Extremities: Full peripheral pulses.  No clubbing, cyanosis or edema.  Skin: No ulceration, breakdown or rash.      Neurologic Exam:  CERVICAL SPINE EXAMINATION:  RANGE OF MOTION: Tenderness palpation to the posterior strap musculature of the neck.  Somewhat limited range of motion in side bending bilaterally and extension and flexion.  PALPATION: Tender to palpation over the levator spinae bilaterally.  Tenderness palpation of the upper and mid trapezius.  STRENGTH: 5/5 bilateral trapezius, deltoid, triceps, biceps, wrist extensors/flexors, finger opposition.  SENSATION: Light touch and pinprick intact C5-T1 bilaterally.  REFLEXES:  DTRs are 2+ bilaterally at biceps, triceps, and brachioradialis.  Lainez's and palmomental are negative bilaterally.  SPECIAL TESTS:  Tinel's & Phalen's are negative. Spurling's is negative bilaterally.     Coordination:  -Finger to nose intact BUEs  -Heel to shin intact BLEs  -No ataxia     Gait  -No signs of ataxia  -ambulates unassisted       Results Review:        Assessment/Plan     Impression:  1.  Headache disorder  2.  Cervical myofascial pain  3.  Occipital neuralgia  4.  Difficulty sleeping      Plan:  1.  Increased dose of nortriptyline, however, I am going to increase this to 75 mg nightly.  She is going to trial this for the next month and if it does not reduce her headaches as the 6 mg dose had been doing, she will then contact me at which time I would recommend proceeding with lateral occipital nerve blocks followed by outpatient physical therapy specifically for cervical myofascial pain.    2.  Anti-inflammatory diet recommended.    3.  Consider physical therapy as an outpatient if this does not improve.  Did discuss stretching of the neck and the cervical and shoulder musculature as this is very tight and uncomfortable for the patient.    The patient will follow-up with me on an as-needed basis.  If this does help her headaches at that dose, I will refill it and then transition management of that to her family physician, thereafter.    The patient voices understanding agreement with plan of care will call for concerns or questions in the interim.        Saul Ovalles PA-C  03/16/20  14:37

## 2021-03-24 NOTE — PROGRESS NOTES
Name:  Donya Capellan  YOB: 1950  Location: [facility]  Location Address: [address] [place] [address], [facility], [address] [place] [address]  Location Phone: [phone]    Chief Complaint  Chief Complaint   Patient presents with   • Skin Lesion     new lesion on chin, biopsed at [facility]       History of Present Illness  The patient  is a 70 y.o. female who is referred by Ashleigh Jiménez MD for preoperative evaluation. She complains of a lesion of the right lower cutaneous lip present for 3 month(s). It has been  biopsied.  Pathology demonstrated a basal cell carcinoma.    I have personally reviewed the information imported into the chart during this visit.      I have personally reviewed the review of systems.                         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 Excision of neoplasm of uncertain origin of the left nasal ala with complex closure  ;  Surgeon: Axel Jiménez MD;  Location: [facility] OR;  Service: ENT   • TUBAL ABDOMINAL LIGATION           Current Outpatient Medications:   •  albuterol sulfate  (90 Base) MCG/ACT inhaler, Inhale 2 puffs Every 6 (Six) Hours As Needed., Disp: , Rfl:   •  atorvastatin (LIPITOR) 10 MG tablet, Take 10 mg by mouth Daily., Disp: , Rfl:   •  CARTIA  MG 24 hr capsule, Take 120 mg by mouth Daily., Disp: , Rfl:   •  clopidogrel (PLAVIX) 75 MG tablet, Take 75 mg by mouth Daily., Disp: , Rfl:   •   Fluticasone-Salmeterol 113-14 MCG/ACT aerosol powder , fluticasone 113 mcg-salmeterol 14 mcg/actuation breath activated powdr, Disp: , Rfl:   •  losartan (COZAAR) 25 MG tablet, Take 25 mg by mouth Daily., Disp: , Rfl:   •  montelukast (SINGULAIR) 10 MG tablet, Take 10 mg by mouth Every Night., Disp: , Rfl:   •  nortriptyline (PAMELOR) 75 MG capsule, Take 1 capsule by mouth Every Night., Disp: 30 capsule, Rfl: 0  •  zolpidem (AMBIEN) 10 MG tablet, , Disp: , Rfl:     Patient has no known allergies.    Family History   Problem Relation Age of Onset   • COPD Mother    • Cancer Sister    • Stroke Brother    • Stroke Maternal Grandfather        Social History     Socioeconomic History   • Marital status:      Spouse name: Not on file   • Number of children: Not on file   • Years of education: Not on file   • Highest education level: Not on file   Tobacco Use   • Smoking status: Never Smoker   • Smokeless tobacco: Never Used   Vaping Use   • Vaping Use: Never used   Substance and Sexual Activity   • Alcohol use: Yes     Comment: socially   • Drug use: No   • Sexual activity: Defer       Review of Systems   Constitutional: Negative.    HENT: Negative.    Eyes: Negative.    Respiratory: Negative.    Cardiovascular: Negative.    Gastrointestinal: Negative.    Endocrine: Negative.    Genitourinary: Negative.    Musculoskeletal: Negative.    Skin:        lesion of the right lower cutaneous lip   Allergic/Immunologic: Negative.    Neurological: Negative.    Hematological: Negative.    Psychiatric/Behavioral: Negative.        Vitals:    03/25/21 0902   BP: 132/100   Pulse: 119   Temp: 97.7 °F (36.5 °C)       Objective     Physical Exam  CONSTITUTIONAL: well nourished, well-developed, alert, oriented, in no acute distress     COMMUNICATION AND VOICE: able to communicate normally, normal voice quality    HEAD: normocephalic, no lesions, atraumatic, no tenderness, no masses     FACE: appearance normal, no lesions, no  tenderness, no deformities, facial motion symmetric    EYES: ocular motility normal, eyelids normal, orbits normal, no proptosis, conjunctiva normal , pupils equal, round     EARS:  Hearing: hearing to conversational voice intact bilaterally   External Ears: normal bilaterally, no lesions    NOSE:  External Nose: external nasal structure normal, no tenderness on palpation, no nasal discharge, no lesions, no evidence of trauma, nostrils patent     ORAL:  Lips: upper and lower lips (vermilion) without lesion   7 mm RPP right lower lip\chin    NECK:  Inspection and Palpation: neck appearance normal, no masses or tenderness    CHEST/RESPIRATORY: normal respiratory effort     CARDIOVASCULAR: no cyanosis or edema     NEUROLOGICAL/PSYCHIATRIC: oriented to time, place and person, mood normal, affect appropriate, CN II-XII intact grossly    Assessment/Plan   Diagnoses and all orders for this visit:    1. Basal cell carcinoma of skin of other parts of face (Primary)  Comments:  Right chinlip  Orders:  -     Case Request; Standing  -     Comprehensive Metabolic Panel; Future  -     CBC and Differential; Future  -     ECG 12 Lead; Future  -     XR Chest 2 View; Future    Other orders  -     Follow Anesthesia Guidelines / Standing Orders; Future  -     Obtain Informed Consent  -     Follow Anesthesia Guidelines / Standing Orders; Standing  -     Verify NPO Status; Standing  -     Obtain Informed Consent; Standing  -     SCD (Sequential Compression Device) - To Be Placed on Patient in Pre-Op; Standing  -     NPO Diet; Standing      Excision of basal cell carcinoma of the right chin\lower lip with frozen section possible flap or graft  (Right)  Orders Placed This Encounter   Procedures   • XR Chest 2 View     Standing Status:   Future     Standing Expiration Date:   3/25/2022     Order Specific Question:   Reason for Exam:     Answer:   Excision of basal cell carcinoma of the right chin\lower lip with frozen section possible flap  "or graft   • Comprehensive Metabolic Panel     Standing Status:   Future     Standing Expiration Date:   3/25/2022   • Follow Anesthesia Guidelines / Standing Orders     Standing Status:   Future   • Obtain Informed Consent     EXCISION LESION with possible flap or graft-     Order Specific Question:   Informed Consent Given For     Answer:   Excision of basal cell carcinoma of the right chin\lower lip with frozen section possible flap or graft   • ECG 12 Lead     Standing Status:   Future     Standing Expiration Date:   3/25/2022     Order Specific Question:   Reason for Exam:     Answer:   EXCISION LESION   • CBC and Differential     Standing Status:   Future     Standing Expiration Date:   3/25/2022     Order Specific Question:   Manual Differential     Answer:   No     Return for postop.       Patient Instructions   Excision of basal cell carcinoma of the right chin\lower lip with frozen section possible flap or graft    The risks, benefits and options of the procedure were explained to the patient. The possiblity of a persistent cosmetic defect as well as a \"flap\" or a graft to close the defect was discussed. The patient was instructed to stop all aspirin, NSAIDs and VIT E etc.  If appropriate, clearance with primary MD or specialist will be obtained preoperatively.  The patient was scheduled for a LOCAL/MAC Anesthesia with a frozen section for margin control.    For instructions on the proper use, care and disposal of controled substances, ask you doctor or refer to the [place] Board of Medicine website: http://kbml.[place].gov/hb1/Pages/Considerations-For-Patient-Education.aspx            "

## 2021-03-25 ENCOUNTER — OFFICE VISIT (OUTPATIENT)
Dept: OTOLARYNGOLOGY | Facility: CLINIC | Age: 71
End: 2021-03-25

## 2021-03-25 VITALS
TEMPERATURE: 97.7 F | HEIGHT: 59 IN | WEIGHT: 135 LBS | DIASTOLIC BLOOD PRESSURE: 100 MMHG | BODY MASS INDEX: 27.21 KG/M2 | HEART RATE: 119 BPM | SYSTOLIC BLOOD PRESSURE: 132 MMHG

## 2021-03-25 DIAGNOSIS — C44.319 BASAL CELL CARCINOMA OF SKIN OF OTHER PARTS OF FACE: Primary | ICD-10-CM

## 2021-03-25 PROCEDURE — 99214 OFFICE O/P EST MOD 30 MIN: CPT | Performed by: OTOLARYNGOLOGY

## 2021-03-25 RX ORDER — FLUTICASONE PROPIONATE AND SALMETEROL 113; 14 UG/1; UG/1
POWDER, METERED RESPIRATORY (INHALATION)
COMMUNITY
End: 2021-04-06

## 2021-03-25 RX ORDER — ZOLPIDEM TARTRATE 10 MG/1
5 TABLET ORAL NIGHTLY PRN
Status: ON HOLD | COMMUNITY
Start: 2021-03-18 | End: 2021-06-23

## 2021-03-25 NOTE — PATIENT INSTRUCTIONS
"Excision of basal cell carcinoma of the right chin\lower lip with frozen section possible flap or graft    The risks, benefits and options of the procedure were explained to the patient. The possiblity of a persistent cosmetic defect as well as a \"flap\" or a graft to close the defect was discussed. The patient was instructed to stop all aspirin, NSAIDs and VIT E etc.  If appropriate, clearance with primary MD or specialist will be obtained preoperatively.  The patient was scheduled for a LOCAL/MAC Anesthesia with a frozen section for margin control.    For instructions on the proper use, care and disposal of controled substances, ask you doctor or refer to the [place] Board of Medicine website: http://kbml.[place].gov/hb1/Pages/Considerations-For-Patient-Education.aspx    "

## 2021-04-02 ENCOUNTER — TRANSCRIBE ORDERS (OUTPATIENT)
Dept: ADMINISTRATIVE | Facility: HOSPITAL | Age: 71
End: 2021-04-02

## 2021-04-02 ENCOUNTER — TELEPHONE (OUTPATIENT)
Dept: OTOLARYNGOLOGY | Facility: CLINIC | Age: 71
End: 2021-04-02

## 2021-04-02 DIAGNOSIS — Z11.59 SCREENING FOR VIRAL DISEASE: Primary | ICD-10-CM

## 2021-04-06 ENCOUNTER — LAB (OUTPATIENT)
Dept: LAB | Facility: HOSPITAL | Age: 71
End: 2021-04-06

## 2021-04-06 ENCOUNTER — HOSPITAL ENCOUNTER (OUTPATIENT)
Dept: GENERAL RADIOLOGY | Facility: HOSPITAL | Age: 71
Discharge: HOME | End: 2021-04-06

## 2021-04-06 ENCOUNTER — PRE-ADMISSION TESTING (OUTPATIENT)
Dept: PREADMISSION TESTING | Facility: HOSPITAL | Age: 71
End: 2021-04-06

## 2021-04-06 VITALS
BODY MASS INDEX: 26.74 KG/M2 | WEIGHT: 136.2 LBS | HEIGHT: 60 IN | DIASTOLIC BLOOD PRESSURE: 90 MMHG | SYSTOLIC BLOOD PRESSURE: 157 MMHG | HEART RATE: 104 BPM | OXYGEN SATURATION: 99 % | RESPIRATION RATE: 16 BRPM

## 2021-04-06 DIAGNOSIS — C44.319 BASAL CELL CARCINOMA OF SKIN OF OTHER PARTS OF FACE: ICD-10-CM

## 2021-04-06 LAB
ALBUMIN SERPL-MCNC: 4.4 G/DL (ref 3.5–5.2)
ALBUMIN/GLOB SERPL: 1.3 G/DL
ALP SERPL-CCNC: 125 U/L (ref 39–117)
ALT SERPL W P-5'-P-CCNC: 21 U/L (ref 1–33)
ANION GAP SERPL CALCULATED.3IONS-SCNC: 10 MMOL/L (ref 5–15)
AST SERPL-CCNC: 31 U/L (ref 1–32)
BASOPHILS # BLD AUTO: 0.04 10*3/MM3 (ref 0–0.2)
BASOPHILS NFR BLD AUTO: 0.6 % (ref 0–1.5)
BILIRUB SERPL-MCNC: 0.5 MG/DL (ref 0–1.2)
BUN SERPL-MCNC: 17 MG/DL (ref 8–23)
BUN/CREAT SERPL: 19.3 (ref 7–25)
CALCIUM SPEC-SCNC: 9.6 MG/DL (ref 8.6–10.5)
CHLORIDE SERPL-SCNC: 100 MMOL/L (ref 98–107)
CO2 SERPL-SCNC: 26 MMOL/L (ref 22–29)
CREAT SERPL-MCNC: 0.88 MG/DL (ref 0.57–1)
DEPRECATED RDW RBC AUTO: 40.5 FL (ref 37–54)
EOSINOPHIL # BLD AUTO: 0.17 10*3/MM3 (ref 0–0.4)
EOSINOPHIL NFR BLD AUTO: 2.4 % (ref 0.3–6.2)
ERYTHROCYTE [DISTWIDTH] IN BLOOD BY AUTOMATED COUNT: 12.9 % (ref 12.3–15.4)
GFR SERPL CREATININE-BSD FRML MDRD: 63 ML/MIN/1.73
GLOBULIN UR ELPH-MCNC: 3.4 GM/DL
GLUCOSE SERPL-MCNC: 103 MG/DL (ref 65–99)
HCT VFR BLD AUTO: 44.3 % (ref 34–46.6)
HGB BLD-MCNC: 15.3 G/DL (ref 12–15.9)
IMM GRANULOCYTES # BLD AUTO: 0.01 10*3/MM3 (ref 0–0.05)
IMM GRANULOCYTES NFR BLD AUTO: 0.1 % (ref 0–0.5)
LYMPHOCYTES # BLD AUTO: 2.13 10*3/MM3 (ref 0.7–3.1)
LYMPHOCYTES NFR BLD AUTO: 29.9 % (ref 19.6–45.3)
MCH RBC QN AUTO: 29.8 PG (ref 26.6–33)
MCHC RBC AUTO-ENTMCNC: 34.5 G/DL (ref 31.5–35.7)
MCV RBC AUTO: 86.2 FL (ref 79–97)
MONOCYTES # BLD AUTO: 0.62 10*3/MM3 (ref 0.1–0.9)
MONOCYTES NFR BLD AUTO: 8.7 % (ref 5–12)
NEUTROPHILS NFR BLD AUTO: 4.15 10*3/MM3 (ref 1.7–7)
NEUTROPHILS NFR BLD AUTO: 58.3 % (ref 42.7–76)
NRBC BLD AUTO-RTO: 0 /100 WBC (ref 0–0.2)
PLATELET # BLD AUTO: 342 10*3/MM3 (ref 140–450)
PMV BLD AUTO: 8.9 FL (ref 6–12)
POTASSIUM SERPL-SCNC: 4.3 MMOL/L (ref 3.5–5.2)
PROT SERPL-MCNC: 7.8 G/DL (ref 6–8.5)
RBC # BLD AUTO: 5.14 10*6/MM3 (ref 3.77–5.28)
SARS-COV-2 ORF1AB RESP QL NAA+PROBE: NOT DETECTED
SODIUM SERPL-SCNC: 136 MMOL/L (ref 136–145)
WBC # BLD AUTO: 7.12 10*3/MM3 (ref 3.4–10.8)

## 2021-04-06 PROCEDURE — U0005 INFEC AGEN DETEC AMPLI PROBE: HCPCS | Performed by: OTOLARYNGOLOGY

## 2021-04-06 PROCEDURE — 71046 X-RAY EXAM CHEST 2 VIEWS: CPT

## 2021-04-06 PROCEDURE — 85025 COMPLETE CBC W/AUTO DIFF WBC: CPT

## 2021-04-06 PROCEDURE — 80053 COMPREHEN METABOLIC PANEL: CPT

## 2021-04-06 PROCEDURE — U0004 COV-19 TEST NON-CDC HGH THRU: HCPCS | Performed by: OTOLARYNGOLOGY

## 2021-04-06 PROCEDURE — 36415 COLL VENOUS BLD VENIPUNCTURE: CPT

## 2021-04-06 PROCEDURE — 93005 ELECTROCARDIOGRAM TRACING: CPT

## 2021-04-06 PROCEDURE — C9803 HOPD COVID-19 SPEC COLLECT: HCPCS | Performed by: OTOLARYNGOLOGY

## 2021-04-06 PROCEDURE — 93010 ELECTROCARDIOGRAM REPORT: CPT | Performed by: INTERNAL MEDICINE

## 2021-04-06 RX ORDER — ASPIRIN 81 MG/1
81 TABLET ORAL DAILY
COMMUNITY
End: 2021-06-09

## 2021-04-06 RX ORDER — IBUPROFEN 200 MG
400 TABLET ORAL EVERY 6 HOURS PRN
COMMUNITY
End: 2022-03-08 | Stop reason: ALTCHOICE

## 2021-04-06 RX ORDER — ACETAMINOPHEN 500 MG
1000 TABLET ORAL EVERY 6 HOURS PRN
COMMUNITY

## 2021-04-06 NOTE — DISCHARGE INSTRUCTIONS
DAY OF SURGERY INSTRUCTIONS        YOUR SURGEON: ***PAPI     PROCEDURE: ***EXCISION OF BASAL CELL CARCINOMA OF THE RIGHT CHIN/LOWER LIP WITH FROZEN SECTION POSSIBLE FLAP OR GRAFT     DATE OF SURGERY: ***4/9/2021    ARRIVAL TIME: AS DIRECTED BY OFFICE    YOU MAY TAKE THE FOLLOWING MEDICATION(S) THE MORNING OF SURGERY WITH A SIP OF WATER: ***CARTIA    ALL OTHER HOME MEDICATIONS CHECK WITH YOUR DOCTOR             MANAGING PAIN AFTER SURGERY    We know you are probably wondering what your pain will be like after surgery.  Following surgery it is unrealistic to expect you will not have pain.   Pain is how our bodies let us know that something is wrong or cautions us to be careful.  That said, our goal is to make your pain tolerable.    Methods we may use to treat your pain include (oral or IV medications, PCAs, epidurals, nerve blocks, etc.)   While some procedures require IV pain medications for a short time after surgery, transitioning to pain medications by mouth allows for better management of pain.   Your nurse will encourage you to take oral pain medications whenever possible.  IV medications work almost immediately, but only last a short while.  Taking medications by mouth allows for a more constant level of medication in your blood stream for a longer period of time.      Once your pain is out of control it is harder to get back under control.  It is important you are aware when your next dose of pain medication is due.  If you are admitted, your nurse may write the time of your next dose on the white board in your room to help you remember.      We are interested in your pain and encourage you to inform us about aggravating factors during your visit.   Many times a simple repositioning every few hours can make a big difference.    If your physician says it is okay, do not let your pain prevent you from getting out of bed. Be sure to call your nurse for assistance prior to getting up so you do not fall.       Before surgery, please decide your tolerable pain goal.  These faces help describe the pain ratings we use on a 0-10 scale.   Be prepared to tell us your goal and whether or not you take pain or anxiety medications at home.      BEFORE YOU COME TO THE HOSPITAL  (Pre-op instructions)  • Do not eat, drink, smoke or chew gum after midnight the night before surgery.  This also includes no mints.  • Morning of surgery take only the medicines you have been instructed with a sip of water unless otherwise instructed  by your physician.  • Do not shave, wear makeup or dark nail polish.  • Remove all jewelry including rings.  • Leave anything you consider valuable at home.  • Leave your suitcase in the car until after your surgery.  • Bring the following with you if applicable:  o Picture ID and insurance, Medicare or Medicaid cards  o Co-pay/deductible required by insurance (cash, check, credit card)  o Copy of advance directive, living will or power-of- documents if not brought to PAT  o CPAP or BIPAP mask and tubing  o Relaxation aids ( book, magazine), etc.  o Hearing aids                                 ON THE DAY OF SURGERY  · On the day of surgery check in at registration located at the main entrance of the hospital.   ? You will be registered and given a beeper with instructions where to wait in the main lobby.  ? When your beeper lights up and vibrates a member of the Outpatient Surgery staff will meet you at the double doors under the stair steps and escort you to your preoperative room.   · You may have cloth compression devices placed on your legs. These help to prevent blood clots and reduce swelling in your legs.  · An IV may be inserted into one of your veins.  · In the operating room, you may be given one or more of the following:  ? A medicine to help you relax (sedative).  ? A medicine to numb the area (local anesthetic).  ? A medicine to make you fall asleep (general anesthetic).  ? A medicine  "that is injected into an area of your body to numb everything below the injection site (regional anesthetic).  · Your surgical site will be marked or identified.  · You may be given an antibiotic through your IV to help prevent infection.  Contact a health care provider if you:  · Develop a fever of more than 100.4°F (38°C) or other feelings of illness during the 48 hours before your surgery.  · Have symptoms that get worse.  Have questions or concerns about your surgery    General Anesthesia/Surgery, Adult  General anesthesia is the use of medicines to make a person \"go to sleep\" (unconscious) for a medical procedure. General anesthesia must be used for certain procedures, and is often recommended for procedures that:  · Last a long time.  · Require you to be still or in an unusual position.  · Are major and can cause blood loss.  The medicines used for general anesthesia are called general anesthetics. As well as making you unconscious for a certain amount of time, these medicines:  · Prevent pain.  · Control your blood pressure.  · Relax your muscles.  Tell a health care provider about:  · Any allergies you have.  · All medicines you are taking, including vitamins, herbs, eye drops, creams, and over-the-counter medicines.  · Any problems you or family members have had with anesthetic medicines.  · Types of anesthetics you have had in the past.  · Any blood disorders you have.  · Any surgeries you have had.  · Any medical conditions you have.  · Any recent upper respiratory, chest, or ear infections.  · Any history of:  ? Heart or lung conditions, such as heart failure, sleep apnea, asthma, or chronic obstructive pulmonary disease (COPD).  ?  service.  ? Depression or anxiety.  · Any tobacco or drug use, including marijuana or alcohol use.  · Whether you are pregnant or may be pregnant.  What are the risks?  Generally, this is a safe procedure. However, problems may occur, including:  · Allergic " reaction.  · Lung and heart problems.  · Inhaling food or liquid from the stomach into the lungs (aspiration).  · Nerve injury.  · Air in the bloodstream, which can lead to stroke.  · Extreme agitation or confusion (delirium) when you wake up from the anesthetic.  · Waking up during your procedure and being unable to move. This is rare.  These problems are more likely to develop if you are having a major surgery or if you have an advanced or serious medical condition. You can prevent some of these complications by answering all of your health care provider's questions thoroughly and by following all instructions before your procedure.  General anesthesia can cause side effects, including:  · Nausea or vomiting.  · A sore throat from the breathing tube.  · Hoarseness.  · Wheezing or coughing.  · Shaking chills.  · Tiredness.  · Body aches.  · Anxiety.  · Sleepiness or drowsiness.  · Confusion or agitation.  RISKS AND COMPLICATIONS OF SURGERY  Your health care provider will discuss possible risks and complications with you before surgery. Common risks and complications include:    · Problems due to the use of anesthetics.  · Blood loss and replacement (does not apply to minor surgical procedures).  · Temporary increase in pain due to surgery.  · Uncorrected pain or problems that the surgery was meant to correct.  · Infection.  · New damage.    What happens before the procedure?    Medicines  Ask your health care provider about:  · Changing or stopping your regular medicines. This is especially important if you are taking diabetes medicines or blood thinners.  · Taking medicines such as aspirin and ibuprofen. These medicines can thin your blood. Do not take these medicines unless your health care provider tells you to take them.  · Taking over-the-counter medicines, vitamins, herbs, and supplements. Do not take these during the week before your procedure unless your health care provider approves them.  General  instructions  · Starting 3-6 weeks before the procedure, do not use any products that contain nicotine or tobacco, such as cigarettes and e-cigarettes. If you need help quitting, ask your health care provider.  · If you brush your teeth on the morning of the procedure, make sure to spit out all of the toothpaste.  · Tell your health care provider if you become ill or develop a cold, cough, or fever.  · If instructed by your health care provider, bring your sleep apnea device with you on the day of your surgery (if applicable).  · Ask your health care provider if you will be going home the same day, the following day, or after a longer hospital stay.  ? Plan to have someone take you home from the hospital or clinic.  ? Plan to have a responsible adult care for you for at least 24 hours after you leave the hospital or clinic. This is important.  What happens during the procedure?  · You will be given anesthetics through both of the following:  ? A mask placed over your nose and mouth.  ? An IV in one of your veins.  · You may receive a medicine to help you relax (sedative).  · After you are unconscious, a breathing tube may be inserted down your throat to help you breathe. This will be removed before you wake up.  · An anesthesia specialist will stay with you throughout your procedure. He or she will:  ? Keep you comfortable and safe by continuing to give you medicines and adjusting the amount of medicine that you get.  ? Monitor your blood pressure, pulse, and oxygen levels to make sure that the anesthetics do not cause any problems.  The procedure may vary among health care providers and hospitals.  What happens after the procedure?  · Your blood pressure, temperature, heart rate, breathing rate, and blood oxygen level will be monitored until the medicines you were given have worn off.  · You will wake up in a recovery area. You may wake up slowly.  · If you feel anxious or agitated, you may be given medicine to  help you calm down.  · If you will be going home the same day, your health care provider may check to make sure you can walk, drink, and urinate.  · Your health care provider will treat any pain or side effects you have before you go home.  · Do not drive for 24 hours if you were given a sedative.  Summary  · General anesthesia is used to keep you still and prevent pain during a procedure.  · It is important to tell your healthcare provider about your medical history and any surgeries you have had, and previous experience with anesthesia.  · Follow your healthcare provider’s instructions about when to stop eating, drinking, or taking certain medicines before your procedure.  · Plan to have someone take you home from the hospital or clinic.  This information is not intended to replace advice given to you by your health care provider. Make sure you discuss any questions you have with your health care provider.  Document Released: 03/26/2009 Document Revised: 08/03/2018 Document Reviewed: 08/03/2018  [employer] Interactive Patient Education © 2019 [employer]      Fall Prevention in Hospitals, Adult  As a hospital patient, your condition and the treatments you receive can increase your risk for falls. Some additional risk factors for falls in a hospital include:  · Being in an unfamiliar environment.  · Being on bed rest.  · Your surgery.  · Taking certain medicines.  · Your tubing requirements, such as intravenous (IV) therapy or catheters.  It is important that you learn how to decrease fall risks while at the hospital. Below are important tips that can help prevent falls.  SAFETY TIPS FOR PREVENTING FALLS  Talk about your risk of falling.  · Ask your health care provider why you are at risk for falling. Is it your medicine, illness, tubing placement, or something else?  · Make a plan with your health care provider to keep you safe from falls.  · Ask your health care provider or pharmacist about side effects of your  medicines. Some medicines can make you dizzy or affect your coordination.  Ask for help.  · Ask for help before getting out of bed. You may need to press your call button.  · Ask for assistance in getting safely to the toilet.  · Ask for a walker or cane to be put at your bedside. Ask that most of the side rails on your bed be placed up before your health care provider leaves the room.  · Ask family or friends to sit with you.  · Ask for things that are out of your reach, such as your glasses, hearing aids, telephone, bedside table, or call button.  Follow these tips to avoid falling:  · Stay lying or seated, rather than standing, while waiting for help.  · Wear rubber-soled slippers or shoes whenever you walk in the hospital.  · Avoid quick, sudden movements.  ¨ Change positions slowly.  ¨ Sit on the side of your bed before standing.  ¨ Stand up slowly and wait before you start to walk.  · Let your health care provider know if there is a spill on the floor.  · Pay careful attention to the medical equipment, electrical cords, and tubes around you.  · When you need help, use your call button by your bed or in the bathroom. Wait for one of your health care providers to help you.  · If you feel dizzy or unsure of your footing, return to bed and wait for assistance.  · Avoid being distracted by the TV, telephone, or another person in your room.  · Do not lean or support yourself on rolling objects, such as IV poles or bedside tables.     This information is not intended to replace advice given to you by your health care provider. Make sure you discuss any questions you have with your health care provider.     Document Released: 12/15/2001 Document Revised: 01/08/2016 Document Reviewed: 08/25/2013  [employer] Interactive Patient Education ©2016 [employer]            PATIENT/FAMILY/RESPONSIBLE PARTY VERBALIZES UNDERSTANDING OF ABOVE EDUCATION.  COPY OF PAIN SCALE GIVEN AND REVIEWED WITH VERBALIZED UNDERSTANDING.

## 2021-04-07 LAB
QT INTERVAL: 366 MS
QTC INTERVAL: 459 MS

## 2021-04-09 ENCOUNTER — ANESTHESIA EVENT (OUTPATIENT)
Dept: PERIOP | Facility: HOSPITAL | Age: 71
End: 2021-04-09

## 2021-04-09 ENCOUNTER — HOSPITAL ENCOUNTER (OUTPATIENT)
Facility: HOSPITAL | Age: 71
Setting detail: HOSPITAL OUTPATIENT SURGERY
Discharge: HOME | End: 2021-04-09
Attending: OTOLARYNGOLOGY | Admitting: OTOLARYNGOLOGY

## 2021-04-09 ENCOUNTER — ANESTHESIA (OUTPATIENT)
Dept: PERIOP | Facility: HOSPITAL | Age: 71
End: 2021-04-09

## 2021-04-09 VITALS
HEART RATE: 87 BPM | SYSTOLIC BLOOD PRESSURE: 147 MMHG | TEMPERATURE: 97.1 F | DIASTOLIC BLOOD PRESSURE: 81 MMHG | RESPIRATION RATE: 16 BRPM | OXYGEN SATURATION: 98 %

## 2021-04-09 DIAGNOSIS — C44.319 BASAL CELL CARCINOMA OF SKIN OF OTHER PARTS OF FACE: Primary | ICD-10-CM

## 2021-04-09 PROCEDURE — 25010000002 PROPOFOL 10 MG/ML EMULSION: Performed by: NURSE ANESTHETIST, CERTIFIED REGISTERED

## 2021-04-09 PROCEDURE — 25010000002 FENTANYL CITRATE (PF) 100 MCG/2ML SOLUTION: Performed by: NURSE ANESTHETIST, CERTIFIED REGISTERED

## 2021-04-09 PROCEDURE — 88331 PATH CONSLTJ SURG 1 BLK 1SPC: CPT | Performed by: PATHOLOGY

## 2021-04-09 PROCEDURE — 88305 TISSUE EXAM BY PATHOLOGIST: CPT | Performed by: OTOLARYNGOLOGY

## 2021-04-09 PROCEDURE — 14040 TIS TRNFR F/C/C/M/N/A/G/H/F: CPT | Performed by: OTOLARYNGOLOGY

## 2021-04-09 RX ORDER — FENTANYL CITRATE 50 UG/ML
25 INJECTION, SOLUTION INTRAMUSCULAR; INTRAVENOUS
Status: DISCONTINUED | OUTPATIENT
Start: 2021-04-09 | End: 2021-04-09 | Stop reason: HOSPADM

## 2021-04-09 RX ORDER — LIDOCAINE HYDROCHLORIDE AND EPINEPHRINE 10; 10 MG/ML; UG/ML
INJECTION, SOLUTION INFILTRATION; PERINEURAL AS NEEDED
Status: DISCONTINUED | OUTPATIENT
Start: 2021-04-09 | End: 2021-04-09 | Stop reason: HOSPADM

## 2021-04-09 RX ORDER — FLUMAZENIL 0.1 MG/ML
0.2 INJECTION INTRAVENOUS AS NEEDED
Status: DISCONTINUED | OUTPATIENT
Start: 2021-04-09 | End: 2021-04-09 | Stop reason: HOSPADM

## 2021-04-09 RX ORDER — HYDROCODONE BITARTRATE AND ACETAMINOPHEN 5; 325 MG/1; MG/1
1 TABLET ORAL EVERY 4 HOURS PRN
Qty: 8 TABLET | Refills: 0 | Status: SHIPPED | OUTPATIENT
Start: 2021-04-09 | End: 2021-06-09

## 2021-04-09 RX ORDER — OXYCODONE AND ACETAMINOPHEN 10; 325 MG/1; MG/1
1 TABLET ORAL ONCE AS NEEDED
Status: DISCONTINUED | OUTPATIENT
Start: 2021-04-09 | End: 2021-04-09 | Stop reason: HOSPADM

## 2021-04-09 RX ORDER — IBUPROFEN 600 MG/1
600 TABLET ORAL ONCE AS NEEDED
Status: DISCONTINUED | OUTPATIENT
Start: 2021-04-09 | End: 2021-04-09 | Stop reason: HOSPADM

## 2021-04-09 RX ORDER — FENTANYL CITRATE 50 UG/ML
INJECTION, SOLUTION INTRAMUSCULAR; INTRAVENOUS AS NEEDED
Status: DISCONTINUED | OUTPATIENT
Start: 2021-04-09 | End: 2021-04-09 | Stop reason: SURG

## 2021-04-09 RX ORDER — SODIUM CHLORIDE 0.9 % (FLUSH) 0.9 %
10 SYRINGE (ML) INJECTION AS NEEDED
Status: DISCONTINUED | OUTPATIENT
Start: 2021-04-09 | End: 2021-04-09 | Stop reason: HOSPADM

## 2021-04-09 RX ORDER — DEXTROSE MONOHYDRATE 25 G/50ML
12.5 INJECTION, SOLUTION INTRAVENOUS AS NEEDED
Status: DISCONTINUED | OUTPATIENT
Start: 2021-04-09 | End: 2021-04-09 | Stop reason: HOSPADM

## 2021-04-09 RX ORDER — HYDROCODONE BITARTRATE AND ACETAMINOPHEN 5; 325 MG/1; MG/1
1 TABLET ORAL ONCE AS NEEDED
Status: DISCONTINUED | OUTPATIENT
Start: 2021-04-09 | End: 2021-04-09 | Stop reason: HOSPADM

## 2021-04-09 RX ORDER — ONDANSETRON 4 MG/1
4 TABLET, FILM COATED ORAL ONCE AS NEEDED
Status: DISCONTINUED | OUTPATIENT
Start: 2021-04-09 | End: 2021-04-09 | Stop reason: HOSPADM

## 2021-04-09 RX ORDER — SODIUM CHLORIDE 0.9 % (FLUSH) 0.9 %
10 SYRINGE (ML) INJECTION EVERY 12 HOURS SCHEDULED
Status: DISCONTINUED | OUTPATIENT
Start: 2021-04-09 | End: 2021-04-09 | Stop reason: HOSPADM

## 2021-04-09 RX ORDER — LIDOCAINE HYDROCHLORIDE 10 MG/ML
0.5 INJECTION, SOLUTION EPIDURAL; INFILTRATION; INTRACAUDAL; PERINEURAL ONCE AS NEEDED
Status: DISCONTINUED | OUTPATIENT
Start: 2021-04-09 | End: 2021-04-09 | Stop reason: HOSPADM

## 2021-04-09 RX ORDER — SODIUM CHLORIDE 0.9 % (FLUSH) 0.9 %
3 SYRINGE (ML) INJECTION AS NEEDED
Status: DISCONTINUED | OUTPATIENT
Start: 2021-04-09 | End: 2021-04-09 | Stop reason: HOSPADM

## 2021-04-09 RX ORDER — MAGNESIUM HYDROXIDE 1200 MG/15ML
LIQUID ORAL AS NEEDED
Status: DISCONTINUED | OUTPATIENT
Start: 2021-04-09 | End: 2021-04-09 | Stop reason: HOSPADM

## 2021-04-09 RX ORDER — LABETALOL HYDROCHLORIDE 5 MG/ML
5 INJECTION, SOLUTION INTRAVENOUS
Status: DISCONTINUED | OUTPATIENT
Start: 2021-04-09 | End: 2021-04-09 | Stop reason: HOSPADM

## 2021-04-09 RX ORDER — OXYCODONE AND ACETAMINOPHEN 7.5; 325 MG/1; MG/1
2 TABLET ORAL EVERY 4 HOURS PRN
Status: DISCONTINUED | OUTPATIENT
Start: 2021-04-09 | End: 2021-04-09 | Stop reason: HOSPADM

## 2021-04-09 RX ORDER — ONDANSETRON 2 MG/ML
4 INJECTION INTRAMUSCULAR; INTRAVENOUS ONCE AS NEEDED
Status: DISCONTINUED | OUTPATIENT
Start: 2021-04-09 | End: 2021-04-09 | Stop reason: HOSPADM

## 2021-04-09 RX ORDER — SODIUM CHLORIDE, SODIUM LACTATE, POTASSIUM CHLORIDE, CALCIUM CHLORIDE 600; 310; 30; 20 MG/100ML; MG/100ML; MG/100ML; MG/100ML
9 INJECTION, SOLUTION INTRAVENOUS CONTINUOUS
Status: DISCONTINUED | OUTPATIENT
Start: 2021-04-09 | End: 2021-04-09 | Stop reason: HOSPADM

## 2021-04-09 RX ORDER — NALOXONE HCL 0.4 MG/ML
0.4 VIAL (ML) INJECTION AS NEEDED
Status: DISCONTINUED | OUTPATIENT
Start: 2021-04-09 | End: 2021-04-09 | Stop reason: HOSPADM

## 2021-04-09 RX ORDER — SODIUM CHLORIDE, SODIUM LACTATE, POTASSIUM CHLORIDE, CALCIUM CHLORIDE 600; 310; 30; 20 MG/100ML; MG/100ML; MG/100ML; MG/100ML
1000 INJECTION, SOLUTION INTRAVENOUS CONTINUOUS
Status: DISCONTINUED | OUTPATIENT
Start: 2021-04-09 | End: 2021-04-09 | Stop reason: HOSPADM

## 2021-04-09 RX ADMIN — FENTANYL CITRATE 100 MCG: 50 INJECTION, SOLUTION INTRAMUSCULAR; INTRAVENOUS at 08:55

## 2021-04-09 RX ADMIN — SODIUM CHLORIDE, POTASSIUM CHLORIDE, SODIUM LACTATE AND CALCIUM CHLORIDE 1000 ML: 600; 310; 30; 20 INJECTION, SOLUTION INTRAVENOUS at 07:25

## 2021-04-09 RX ADMIN — PROPOFOL 150 MCG/KG/MIN: 10 INJECTION, EMULSION INTRAVENOUS at 08:55

## 2021-04-09 NOTE — ANESTHESIA POSTPROCEDURE EVALUATION
Patient: Donya Capellan    Procedure Summary     Date: 04/09/21 Room / Location:  [facility] 02 /  [facility]    Anesthesia Start: 0853 Anesthesia Stop: 0952    Procedures:       Excision of basal cell carcinoma of the right chin with transposition flap  (Right )      possible flap or graft (Right ) Diagnosis:       Basal cell carcinoma of skin of other parts of face      (Basal cell carcinoma of skin of other parts of face [C44.319])    Surgeons: Axel Jiménez MD Provider: Moriah Becker CRNA    Anesthesia Type: MAC ASA Status: 3          Anesthesia Type: MAC    Vitals  Vitals Value Taken Time   /81 04/09/21 1025   Temp     Pulse 87 04/09/21 1025   Resp 16 04/09/21 1025   SpO2 98 % 04/09/21 1025           Post Anesthesia Care and Evaluation    Patient location during evaluation: PACU  Patient participation: complete - patient participated  Level of consciousness: awake and alert  Pain management: adequate  Airway patency: patent  Anesthetic complications: No anesthetic complications    Cardiovascular status: acceptable  Respiratory status: acceptable  Hydration status: acceptable    Comments: Blood pressure 147/81, pulse 87, temperature 97.1 °F (36.2 °C), temperature source Temporal, resp. rate 16, SpO2 98 %, not currently breastfeeding.    Pt discharged from PACU based on ranjit score >8  No anesthesia care post op

## 2021-04-09 NOTE — DISCHARGE INSTRUCTIONS
YOUR NEXT PAIN MEDICATION IS DUE AT______________        Moderate Conscious Sedation, Adult, Care After  Refer to this sheet in the next few weeks. These instructions provide you with information on caring for yourself after your procedure. Your health care provider may also give you more specific instructions. Your treatment has been planned according to current medical practices, but problems sometimes occur. Call your health care provider if you have any problems or questions after your procedure.  WHAT TO EXPECT AFTER THE PROCEDURE    After your procedure:  · You may feel sleepy, clumsy, and have poor balance for several hours.  · Vomiting may occur if you eat too soon after the procedure.  HOME CARE INSTRUCTIONS  · Do not participate in any activities where you could become injured for at least 24 hours. Do not:  ¨ Drive.  ¨ Swim.  ¨ Ride a bicycle.  ¨ Operate heavy machinery.  ¨ Cook.  ¨ Use power tools.  ¨ Climb ladders.  ¨ Work from a high place.  · Do not make important decisions or sign legal documents until you are improved.  · If you vomit, drink water, juice, or soup when you can drink without vomiting. Make sure you have little or no nausea before eating solid foods.  · Only take over-the-counter or prescription medicines for pain, discomfort, or fever as directed by your health care provider.  · Make sure you and your family fully understand everything about the medicines given to you, including what side effects may occur.  · You should not drink alcohol, take sleeping pills, or take medicines that cause drowsiness for at least 24 hours.  · If you smoke, do not smoke without supervision.  · If you are feeling better, you may resume normal activities 24 hours after you were sedated.  · Keep all appointments with your health care provider.  SEEK MEDICAL CARE IF:  · Your skin is pale or bluish in color.  · You continue to feel nauseous or vomit.  · Your pain is getting worse and is not helped by  medicine.  · You have bleeding or swelling.  · You are still sleepy or feeling clumsy after 24 hours.  SEEK IMMEDIATE MEDICAL CARE IF:  · You develop a rash.  · You have difficulty breathing.  · You develop any type of allergic problem.  · You have a fever.  MAKE SURE YOU:  · Understand these instructions.  · Will watch your condition.  · Will get help right away if you are not doing well or get worse.     This information is not intended to replace advice given to you by your health care provider. Make sure you discuss any questions you have with your health care provider.     Document Released: 10/08/2014 Document Revised: 01/08/2016 Document Reviewed: 10/08/2014  [employer] Interactive Patient Education ©2016 [employer]         CALL YOUR PHYSICIAN IF YOU EXPERIENCE  INCREASED PAIN NOT HELPED BY YOUR PAIN MEDICATION.        Fall Prevention in the Home      Falls can cause injuries. They can happen to people of all ages. There are many things you can do to make your home safe and to help prevent falls.    WHAT CAN I DO ON THE OUTSIDE OF MY HOME?  · Regularly fix the edges of walkways and driveways and fix any cracks.  · Remove anything that might make you trip as you walk through a door, such as a raised step or threshold.  · Trim any bushes or trees on the path to your home.  · Use bright outdoor lighting.  · Clear any walking paths of anything that might make someone trip, such as rocks or tools.  · Regularly check to see if handrails are loose or broken. Make sure that both sides of any steps have handrails.  · Any raised decks and porches should have guardrails on the edges.  · Have any leaves, snow, or ice cleared regularly.  · Use sand or salt on walking paths during winter.  · Clean up any spills in your garage right away. This includes oil or grease spills.  WHAT CAN I DO IN THE BATHROOM?    · Use night lights.  · Install grab bars by the toilet and in the tub and shower. Do not use towel bars as grab  bars.  · Use non-skid mats or decals in the tub or shower.  · If you need to sit down in the shower, use a plastic, non-slip stool.  · Keep the floor dry. Clean up any water that spills on the floor as soon as it happens.  · Remove soap buildup in the tub or shower regularly.  · Attach bath mats securely with double-sided non-slip rug tape.  · Do not have throw rugs and other things on the floor that can make you trip.  WHAT CAN I DO IN THE BEDROOM?  · Use night lights.  · Make sure that you have a light by your bed that is easy to reach.  · Do not use any sheets or blankets that are too big for your bed. They should not hang down onto the floor.  · Have a firm chair that has side arms. You can use this for support while you get dressed.  · Do not have throw rugs and other things on the floor that can make you trip.  WHAT CAN I DO IN THE KITCHEN?  · Clean up any spills right away.  · Avoid walking on wet floors.  · Keep items that you use a lot in easy-to-reach places.  · If you need to reach something above you, use a strong step stool that has a grab bar.  · Keep electrical cords out of the way.  · Do not use floor polish or wax that makes floors slippery. If you must use wax, use non-skid floor wax.  · Do not have throw rugs and other things on the floor that can make you trip.  WHAT CAN I DO WITH MY STAIRS?  · Do not leave any items on the stairs.  · Make sure that there are handrails on both sides of the stairs and use them. Fix handrails that are broken or loose. Make sure that handrails are as long as the stairways.  · Check any carpeting to make sure that it is firmly attached to the stairs. Fix any carpet that is loose or worn.  · Avoid having throw rugs at the top or bottom of the stairs. If you do have throw rugs, attach them to the floor with carpet tape.  · Make sure that you have a light switch at the top of the stairs and the bottom of the stairs. If you do not have them, ask someone to add them for  you.  WHAT ELSE CAN I DO TO HELP PREVENT FALLS?  · Wear shoes that:  ¨ Do not have high heels.  ¨ Have rubber bottoms.  ¨ Are comfortable and fit you well.  ¨ Are closed at the toe. Do not wear sandals.  · If you use a stepladder:  ¨ Make sure that it is fully opened. Do not climb a closed stepladder.  ¨ Make sure that both sides of the stepladder are locked into place.  ¨ Ask someone to hold it for you, if possible.  · Clearly kristine and make sure that you can see:  ¨ Any grab bars or handrails.  ¨ First and last steps.  ¨ Where the edge of each step is.  · Use tools that help you move around (mobility aids) if they are needed. These include:  ¨ Canes.  ¨ Walkers.  ¨ Scooters.  ¨ Crutches.  · Turn on the lights when you go into a dark area. Replace any light bulbs as soon as they burn out.  · Set up your furniture so you have a clear path. Avoid moving your furniture around.  · If any of your floors are uneven, fix them.  · If there are any pets around you, be aware of where they are.  · Review your medicines with your doctor. Some medicines can make you feel dizzy. This can increase your chance of falling.  Ask your doctor what other things that you can do to help prevent falls.     This information is not intended to replace advice given to you by your health care provider. Make sure you discuss any questions you have with your health care provider.     Document Released: 10/14/2010 Document Revised: 05/03/2016 Document Reviewed: 01/22/2016  [employer]vier Interactive Patient Education ©2016 [employer]     PATIENT/FAMILY/RESPONSIBLE PARTY VERBALIZES UNDERSTANDING OF ABOVE EDUCATION.  COPY OF PAIN SCALE GIVEN AND REVIEWED WITH VERBALIZED UNDERSTANDING.

## 2021-04-09 NOTE — ANESTHESIA PREPROCEDURE EVALUATION
Anesthesia Evaluation     Patient summary reviewed   NPO Solid Status: > 8 hours             Airway   Mallampati: II  TM distance: >3 FB  Neck ROM: full  Dental      Pulmonary    (+) asthma,  (-) COPD, sleep apnea, not a smoker  Cardiovascular   Exercise tolerance: excellent (>7 METS)    (+) hypertension, hyperlipidemia,   (-) pacemaker, past MI, angina, cardiac stents      Neuro/Psych  (+) CVA,     (-) seizures, TIA  GI/Hepatic/Renal/Endo    (-) GERD, liver disease, no renal disease, diabetes    Musculoskeletal     Abdominal    Substance History      OB/GYN          Other                        Anesthesia Plan    ASA 3     MAC       Anesthetic plan, all risks, benefits, and alternatives have been provided, discussed and informed consent has been obtained with: patient.

## 2021-04-09 NOTE — OP NOTE
OPERATIVE NOTE  4/9/2021    NAME: Donya Capellan    YOB: 1950  MRN: [id number]    PRE-OPERATIVE DIAGNOSIS:    Basal cell carcinoma of skin of other parts of face [C44.319]    POST-OPERATIVE DIAGNOSIS:   Post-Op Diagnosis Codes:     * Basal cell carcinoma of skin of other parts of face [C44.319]    PROCEDURE PERFORMED:   Excision of basal cell carcinoma of the right chin with transposition flap    SURGEON:   Axel Jiménez MD    ASSISTANT(S):   None    ANESTHESIA:   MAC and Local Anesthesia with 1% Xylocaine with Epinephrine 1:100,000    INDICATIONS: The patient is a 70 y.o. female with Basal cell carcinoma of skin of other parts of face [C44.319]    PROCEDURE:  The patient was brought to the operating room, given MAC and Local Anesthesia with 1% Xylocaine with Epinephrine 1:100,000, and prepped and draped in the usual manner.       Approximately 5mL 1% Xylocaine with epinephrine was injected in the planned excision site in the right chin.  Excision was accomplished with a #15 blade in oblong fashion without difficulty.  The excision was approximately 1.2 cm  x 1.0 cm.  The lesion was approximately 1.0 cm x 0.7 cm.  The margin was 1 mm. Upon excision the specimen was marked and submitted for frozen section examination which demonstrated findings consistent with a basal cell carcinoma with margins free of involvement with tumor.    An inferiorly based transposition flap was fashioned and wide undermining performed with curved iris scissors and double prong skin hooks.  Minimal bleeding was encountered which was controlled with electrocautery and low settings.  The flap was created to preserve normal anatomic relationships and to facilitate closure.  The flap was transposed and the donor site as well as the flap sutured in place utilizing interrupted 4-0 Monocryl subcutaneously and interrupted 5-0 nylon to reapproximate the epidermis.  Bactroban ointment was applied and the procedure  terminated.    The patient was transported upon extubation to the postanesthesia care unit in stable condition.    SPECIMENS:  A: Basal cell carcinoma of the right chin    COMPLICATIONS: NONE    ESTIMATED BLOOD LOSS:  Minimal    Axel Jiménez MD  4/9/2021

## 2021-04-14 LAB
CYTO UR: NORMAL
LAB AP CASE REPORT: NORMAL
Lab: NORMAL
PATH REPORT.FINAL DX SPEC: NORMAL
PATH REPORT.GROSS SPEC: NORMAL

## 2021-04-19 ENCOUNTER — OFFICE VISIT (OUTPATIENT)
Dept: OTOLARYNGOLOGY | Facility: CLINIC | Age: 71
End: 2021-04-19

## 2021-04-19 DIAGNOSIS — C44.319 BASAL CELL CARCINOMA OF SKIN OF OTHER PARTS OF FACE: Primary | ICD-10-CM

## 2021-04-19 PROCEDURE — 99024 POSTOP FOLLOW-UP VISIT: CPT | Performed by: OTOLARYNGOLOGY

## 2021-04-19 NOTE — PROGRESS NOTES
Procedure   Suture Removal    Date/Time: 4/19/2021 10:59 AM  Performed by: Estephanie Pearson RN  Authorized by: Axel Jiménez MD   Body area: head/neck  Location details: chin  Comments: Patient presents for suture removal. The incision is well-approximated with no redness or edema noted. Patient notified of path

## 2021-06-08 NOTE — H&P (VIEW-ONLY)
"Chief Complaint:   Chief Complaint   Patient presents with   • Heartburn     pt says sometimes food gets stuck in her throat         Patient ID: Donya Capellan is a 70 y.o. female     History of Present Illness: This is a very pleasant 70-year-old female who is here today with complaints of heartburn, GERD and dysphagia.    The patient states that over the past few months she has begun to notice food getting stuck \"in my esophagus and feels as though it will not go down it occurs with most anything I eat.\"  She states that this does not occur on a daily basis.  She has had accompanying heartburn as well as epigastric pain at times.  She states over the past 2 months she has been taking ibuprofen about every other day.    The patient denies any nausea, vomiting, or hematemesis.  The patient denies any fever or chills.  Denies any melena or hematochezia.  Denies any unintentional weight loss or loss of appetite.      Past Medical History:   Diagnosis Date   • Anxiety    • Arthritis    • Asthma    • Cancer (CMS/HCC)    • DDD (degenerative disc disease), cervical    • DDD (degenerative disc disease), lumbar    • DDD (degenerative disc disease), thoracic    • History of pancreatitis    • History of skin cancer    • Hyperlipidemia    • Hypertension    • Stroke (CMS/HCC) 04/09/2017    RESIDUAL R HAND NUMBNESS/TINGLING   • Tachycardia        Past Surgical History:   Procedure Laterality Date   • CHOLECYSTECTOMY     • COLONOSCOPY  09/19/2013    Internal hemorrhoids; Normal ileum; Repeat 10 years   • COLONOSCOPY  03/05/2008    Internal hemorrhoids; Repeat 7 years   • FLAP HEAD/NECK Bilateral 4/24/2019    Procedure: POSSIBLE FLAP OR GRAFT;  Surgeon: Axel Jiménez MD;  Location:  [facility] OR;  Service: ENT   • FLAP HEAD/NECK Right 4/9/2021    Procedure: possible flap or graft;  Surgeon: Axel Jiménez MD;  Location:  [facility] OR;  Service: ENT;  Laterality: Right;   • HEAD/NECK LESION/CYST EXCISION Bilateral 4/24/2019 "    Procedure: 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 Excision of neoplasm of uncertain origin of the left nasal ala with complex closure  ;  Surgeon: Axel Jiménez MD;  Location:  [facility] OR;  Service: ENT   • HEAD/NECK LESION/CYST EXCISION Right 4/9/2021    Procedure: Excision of basal cell carcinoma of the right chin with transposition flap ;  Surgeon: Axel Jiménez MD;  Location:  [facility] OR;  Service: ENT;  Laterality: Right;   • TUBAL ABDOMINAL LIGATION           Current Outpatient Medications:   •  acetaminophen (TYLENOL) 500 MG tablet, Take 1,000 mg by mouth Every 6 (Six) Hours As Needed for Mild Pain ., Disp: , Rfl:   •  albuterol sulfate  (90 Base) MCG/ACT inhaler, Inhale 2 puffs Every 6 (Six) Hours As Needed., Disp: , Rfl:   •  atorvastatin (LIPITOR) 10 MG tablet, Take 10 mg by mouth Daily., Disp: , Rfl:   •  BIOTIN PO, Take  by mouth., Disp: , Rfl:   •  CARTIA  MG 24 hr capsule, Take 120 mg by mouth Daily., Disp: , Rfl:   •  clopidogrel (PLAVIX) 75 MG tablet, Take 75 mg by mouth Daily., Disp: , Rfl:   •  ibuprofen (IBU) 200 MG tablet, Take 400 mg by mouth Every 6 (Six) Hours As Needed for Mild Pain ., Disp: , Rfl:   •  losartan (COZAAR) 25 MG tablet, Take 25 mg by mouth Daily., Disp: , Rfl:   •  montelukast (SINGULAIR) 10 MG tablet, Take 10 mg by mouth Daily., Disp: , Rfl:   •  multivitamin with minerals (MULTIVITAMIN WOMENS 50+ ADV PO), Take 1 tablet by mouth Daily., Disp: , Rfl:   •  nortriptyline (PAMELOR) 75 MG capsule, Take 1 capsule by mouth Every Night., Disp: 30 capsule, Rfl: 0  •  zolpidem (AMBIEN) 10 MG tablet, Take 5 mg by mouth At Night As Needed., Disp: , Rfl:   •  pantoprazole (Protonix) 40 MG EC tablet, Take 1 tablet by mouth Daily., Disp: 30 tablet, Rfl: 11  •  sucralfate (Carafate) 1 g tablet, Take 1 tablet by mouth 4 (Four) Times a Day., Disp: 120 tablet, Rfl: 0    No Known Allergies    Social History     Socioeconomic  "History   • Marital status:      Spouse name: Not on file   • Number of children: Not on file   • Years of education: Not on file   • Highest education level: Not on file   Tobacco Use   • Smoking status: Never Smoker   • Smokeless tobacco: Never Used   Vaping Use   • Vaping Use: Never used   Substance and Sexual Activity   • Alcohol use: Yes     Comment: socially   • Drug use: No   • Sexual activity: Defer       Family History   Problem Relation Age of Onset   • COPD Mother    • Cancer Sister    • Stroke Brother    • Stroke Maternal Grandfather    • Colon polyps Daughter 51   • Colon cancer Neg Hx    • Esophageal cancer Neg Hx        Vitals:    06/09/21 0933   BP: 142/88   Pulse: 95   Temp: 97.1 °F (36.2 °C)   SpO2: 99%   Weight: 60.3 kg (133 lb)   Height: 149.9 cm (59\")       Review of Systems:    General:    Present -feeling well   Skin:    Not Present-Rash   HEENT:     Not Present-Acute visual changes or Acute hearing changes   Neck :    Not Present- swollen glands   Genitourinary:      Not Present- burning, frequency, urgency hematuria, dysuria,   Cardiovascular:   Not Present-chest pain, palpitations, or pressure   Respiratory:   Not Present- shortness of breath or cough   Gastrointestinal:  Musculoskeletal:  Neurological:  Psychiatric:   Present as mentioned in the HP    Not Present. Recent gait disturbances.    Not Present-Seizures and weakness in extremities.    Not Present- Anxiety or Depression.       Physical Exam:    General Appearance:    Alert, cooperative, in no acute distress   Psych:    Mood appropriate    Eyes:          conjunctivae and sclerae normal, no   icterus, no pallor   ENMT:    Ears appear intact with no abnormalities noted oral mucosa moist   Neck:   No adenopathy, supple, trachea midline, no thyromegaly, no   carotid bruit, no JVD    Cardiovascular:    Regular rhythm and normal rate, normal S1 and S2, no            murmur, no gallop, no rub, no click   Gastrointestinal:     " Inspection normal.  Normal bowel sounds, no masses, no organomegaly, soft round non-tender, non-distended, no guarding, no rebound or tenderness. No hepatosplenomegaly.   Skin:   No bleeding, bruising or rash   Neurologic:   nonfocal       Lab Results - Last 18 Months   Lab Units 04/06/21  0922   GLUCOSE mg/dL 103*   BUN mg/dL 17   CREATININE mg/dL 0.88   SODIUM mmol/L 136   POTASSIUM mmol/L 4.3   CHLORIDE mmol/L 100   CO2 mmol/L 26.0   TOTAL PROTEIN g/dL 7.8   ALBUMIN g/dL 4.40   ALT (SGPT) U/L 21   AST (SGOT) U/L 31   ALK PHOS U/L 125*   BILIRUBIN mg/dL 0.5   GLOBULIN gm/dL 3.4       Lab Results - Last 18 Months   Lab Units 04/06/21  0922   HEMOGLOBIN g/dL 15.3   HEMATOCRIT % 44.3   MCV fL 86.2   WBC 10*3/mm3 7.12   RDW % 12.9   MPV fL 8.9   PLATELETS 10*3/mm3 342         Assessment and Plan:  Assessment/Plan   Diagnoses and all orders for this visit:    1. Other dysphagia (Primary)  -     Case Request; Standing  -     Case Request    2. Heartburn  -     Case Request; Standing  -     Case Request    3. Epigastric pain  -     Case Request; Standing  -     Case Request    4. Antiplatelet or antithrombotic long-term use  Comments:  on Plavix     5. Encounter for long-term (current) use of NSAIDs  -     Case Request; Standing  -     Case Request    6. Gastroesophageal reflux disease, unspecified whether esophagitis present  -     Case Request; Standing  -     Case Request    Other orders  -     pantoprazole (Protonix) 40 MG EC tablet; Take 1 tablet by mouth Daily.  Dispense: 30 tablet; Refill: 11  -     sucralfate (Carafate) 1 g tablet; Take 1 tablet by mouth 4 (Four) Times a Day.  Dispense: 120 tablet; Refill: 0  -     Follow Anesthesia Guidelines / Protocol; Future  -     Obtain Informed Consent; Future  -     Implement Anesthesia Orders Day of Procedure; Standing  -     Obtain Informed Consent; Standing      Will schedule patient for EGD.  Will initiate Protonix 40 mg daily along with Carafate 4 times  daily to be crushed and dissolved in water as directed.  I have instructed patient to discontinue all NSAID use and try to use Tylenol only.     There are no Patient Instructions on file for this visit.    Next follow-up appointment    The risks, benefits, and alternatives of endoscopy were reviewed with the patient today.  Risks including perforation, with or without dilation, possibly requiring surgery.  Additional risks include risk of bleeding.  There is also the risk of a drug reaction or problems with anesthesia.  This will be discussed with the further by the anesthesia team on the day of the procedure. The benefits include the diagnosis and management of disease of the upper digestive tract.  Alternatives to endoscopy include upper GI series, laboratory testing, radiographic evaluation, or no intervention.  The patient verbalizes understanding and agrees.      EMR Dragon/Transcription disclaimer:  Much of this encounter note is an electronic transcription/translation of spoken language to printed text. The electronic translation of spoken language may permit erroneous, or at times, nonsensical words or phrases to be inadvertently transcribed; although I have reviewed the note for such errors, some may still exist.

## 2021-06-09 ENCOUNTER — OFFICE VISIT (OUTPATIENT)
Dept: GASTROENTEROLOGY | Facility: CLINIC | Age: 71
End: 2021-06-09

## 2021-06-09 VITALS
OXYGEN SATURATION: 99 % | WEIGHT: 133 LBS | SYSTOLIC BLOOD PRESSURE: 142 MMHG | BODY MASS INDEX: 26.81 KG/M2 | TEMPERATURE: 97.1 F | HEIGHT: 59 IN | HEART RATE: 95 BPM | DIASTOLIC BLOOD PRESSURE: 88 MMHG

## 2021-06-09 DIAGNOSIS — K21.9 GASTROESOPHAGEAL REFLUX DISEASE, UNSPECIFIED WHETHER ESOPHAGITIS PRESENT: ICD-10-CM

## 2021-06-09 DIAGNOSIS — R13.19 OTHER DYSPHAGIA: Primary | ICD-10-CM

## 2021-06-09 DIAGNOSIS — Z79.1 ENCOUNTER FOR LONG-TERM (CURRENT) USE OF NSAIDS: ICD-10-CM

## 2021-06-09 DIAGNOSIS — Z79.02 ANTIPLATELET OR ANTITHROMBOTIC LONG-TERM USE: ICD-10-CM

## 2021-06-09 DIAGNOSIS — R12 HEARTBURN: ICD-10-CM

## 2021-06-09 DIAGNOSIS — R10.13 EPIGASTRIC PAIN: ICD-10-CM

## 2021-06-09 PROCEDURE — 99204 OFFICE O/P NEW MOD 45 MIN: CPT | Performed by: NURSE PRACTITIONER

## 2021-06-09 RX ORDER — PANTOPRAZOLE SODIUM 40 MG/1
40 TABLET, DELAYED RELEASE ORAL DAILY
Qty: 30 TABLET | Refills: 11 | Status: SHIPPED | OUTPATIENT
Start: 2021-06-09 | End: 2022-06-09

## 2021-06-09 RX ORDER — SUCRALFATE 1 G/1
1 TABLET ORAL 4 TIMES DAILY
Qty: 120 TABLET | Refills: 0 | Status: SHIPPED | OUTPATIENT
Start: 2021-06-09 | End: 2021-06-23 | Stop reason: HOSPADM

## 2021-06-09 RX ORDER — MULTIPLE VITAMINS W/ MINERALS TAB 9MG-400MCG
1 TAB ORAL DAILY
COMMUNITY

## 2021-06-10 PROBLEM — R10.13 EPIGASTRIC PAIN: Status: ACTIVE | Noted: 2021-06-10

## 2021-06-10 PROBLEM — R12 HEARTBURN: Status: ACTIVE | Noted: 2021-06-10

## 2021-06-10 PROBLEM — R13.19 OTHER DYSPHAGIA: Status: ACTIVE | Noted: 2021-06-10

## 2021-06-10 PROBLEM — Z79.1 ENCOUNTER FOR LONG-TERM (CURRENT) USE OF NSAIDS: Status: ACTIVE | Noted: 2021-06-10

## 2021-06-10 PROBLEM — K21.9 GASTROESOPHAGEAL REFLUX DISEASE: Status: ACTIVE | Noted: 2021-06-10

## 2021-06-17 ENCOUNTER — TRANSCRIBE ORDERS (OUTPATIENT)
Dept: LAB | Facility: HOSPITAL | Age: 71
End: 2021-06-17

## 2021-06-17 DIAGNOSIS — Z01.818 PREOPERATIVE TESTING: Primary | ICD-10-CM

## 2021-06-18 ENCOUNTER — TELEPHONE (OUTPATIENT)
Dept: GASTROENTEROLOGY | Facility: CLINIC | Age: 71
End: 2021-06-18

## 2021-06-18 NOTE — TELEPHONE ENCOUNTER
We rec'd clearance from Dr. Alvarenga for pt to hold Plavix X 5 days before procedure. Yesterday was her last dose of Plavix-we hadn't rec'd clearance yesterday but pt was advised to hold it until we heard from his office. I spoke to pt earlier this afternoon-advised her Dr. Alvarenga gave us the ok-she will not take any more Plavix. Pt was advised Dr. Weber would let her know the day of her procedure when to resume the Plavix.

## 2021-06-22 ENCOUNTER — LAB (OUTPATIENT)
Dept: LAB | Facility: HOSPITAL | Age: 71
End: 2021-06-22

## 2021-06-22 LAB — SARS-COV-2 ORF1AB RESP QL NAA+PROBE: NOT DETECTED

## 2021-06-22 PROCEDURE — U0005 INFEC AGEN DETEC AMPLI PROBE: HCPCS | Performed by: INTERNAL MEDICINE

## 2021-06-22 PROCEDURE — C9803 HOPD COVID-19 SPEC COLLECT: HCPCS | Performed by: INTERNAL MEDICINE

## 2021-06-22 PROCEDURE — U0004 COV-19 TEST NON-CDC HGH THRU: HCPCS | Performed by: INTERNAL MEDICINE

## 2021-06-23 ENCOUNTER — ANESTHESIA (OUTPATIENT)
Dept: GASTROENTEROLOGY | Facility: HOSPITAL | Age: 71
End: 2021-06-23

## 2021-06-23 ENCOUNTER — HOSPITAL ENCOUNTER (OUTPATIENT)
Facility: HOSPITAL | Age: 71
Setting detail: HOSPITAL OUTPATIENT SURGERY
Discharge: HOME | End: 2021-06-23
Attending: INTERNAL MEDICINE | Admitting: INTERNAL MEDICINE

## 2021-06-23 ENCOUNTER — ANESTHESIA EVENT (OUTPATIENT)
Dept: GASTROENTEROLOGY | Facility: HOSPITAL | Age: 71
End: 2021-06-23

## 2021-06-23 VITALS
HEART RATE: 95 BPM | TEMPERATURE: 97.5 F | RESPIRATION RATE: 17 BRPM | BODY MASS INDEX: 26.61 KG/M2 | OXYGEN SATURATION: 98 % | DIASTOLIC BLOOD PRESSURE: 83 MMHG | HEIGHT: 59 IN | SYSTOLIC BLOOD PRESSURE: 143 MMHG | WEIGHT: 132 LBS

## 2021-06-23 PROCEDURE — C1726 CATH, BAL DIL, NON-VASCULAR: HCPCS | Performed by: INTERNAL MEDICINE

## 2021-06-23 PROCEDURE — 25010000002 PROPOFOL 10 MG/ML EMULSION: Performed by: NURSE ANESTHETIST, CERTIFIED REGISTERED

## 2021-06-23 PROCEDURE — 43249 ESOPH EGD DILATION <30 MM: CPT | Performed by: INTERNAL MEDICINE

## 2021-06-23 RX ORDER — MIDAZOLAM HYDROCHLORIDE 1 MG/ML
0.5 INJECTION, SOLUTION INTRAMUSCULAR; INTRAVENOUS
Status: CANCELLED | OUTPATIENT
Start: 2021-06-23

## 2021-06-23 RX ORDER — LIDOCAINE HYDROCHLORIDE 10 MG/ML
0.5 INJECTION, SOLUTION EPIDURAL; INFILTRATION; INTRACAUDAL; PERINEURAL ONCE AS NEEDED
Status: CANCELLED | OUTPATIENT
Start: 2021-06-23

## 2021-06-23 RX ORDER — LIDOCAINE HYDROCHLORIDE 20 MG/ML
INJECTION, SOLUTION EPIDURAL; INFILTRATION; INTRACAUDAL; PERINEURAL AS NEEDED
Status: DISCONTINUED | OUTPATIENT
Start: 2021-06-23 | End: 2021-06-23 | Stop reason: SURG

## 2021-06-23 RX ORDER — ASPIRIN 81 MG/1
81 TABLET ORAL DAILY
COMMUNITY
End: 2022-03-08 | Stop reason: ALTCHOICE

## 2021-06-23 RX ORDER — SODIUM CHLORIDE 0.9 % (FLUSH) 0.9 %
10 SYRINGE (ML) INJECTION AS NEEDED
Status: DISCONTINUED | OUTPATIENT
Start: 2021-06-23 | End: 2021-06-23 | Stop reason: HOSPADM

## 2021-06-23 RX ORDER — SODIUM CHLORIDE 9 MG/ML
100 INJECTION, SOLUTION INTRAVENOUS CONTINUOUS
Status: CANCELLED | OUTPATIENT
Start: 2021-06-23

## 2021-06-23 RX ORDER — PROPOFOL 10 MG/ML
VIAL (ML) INTRAVENOUS AS NEEDED
Status: DISCONTINUED | OUTPATIENT
Start: 2021-06-23 | End: 2021-06-23 | Stop reason: SURG

## 2021-06-23 RX ORDER — SODIUM CHLORIDE 9 MG/ML
500 INJECTION, SOLUTION INTRAVENOUS CONTINUOUS PRN
Status: DISCONTINUED | OUTPATIENT
Start: 2021-06-23 | End: 2021-06-23 | Stop reason: HOSPADM

## 2021-06-23 RX ORDER — SODIUM CHLORIDE 0.9 % (FLUSH) 0.9 %
10 SYRINGE (ML) INJECTION AS NEEDED
Status: CANCELLED | OUTPATIENT
Start: 2021-06-23

## 2021-06-23 RX ORDER — SODIUM CHLORIDE 0.9 % (FLUSH) 0.9 %
10 SYRINGE (ML) INJECTION EVERY 12 HOURS SCHEDULED
Status: CANCELLED | OUTPATIENT
Start: 2021-06-23

## 2021-06-23 RX ADMIN — SODIUM CHLORIDE 500 ML: 9 INJECTION, SOLUTION INTRAVENOUS at 12:34

## 2021-06-23 RX ADMIN — LIDOCAINE HYDROCHLORIDE 200 MG: 20 INJECTION, SOLUTION EPIDURAL; INFILTRATION; INTRACAUDAL; PERINEURAL at 13:49

## 2021-06-23 RX ADMIN — PROPOFOL 120 MG: 10 INJECTION, EMULSION INTRAVENOUS at 13:49

## 2021-06-23 NOTE — ANESTHESIA PREPROCEDURE EVALUATION
Anesthesia Evaluation     Patient summary reviewed   no history of anesthetic complications:  NPO Solid Status: > 8 hours  NPO Liquid Status: > 6 hours           Airway   Mallampati: II  TM distance: >3 FB  Neck ROM: full  Dental - normal exam     Pulmonary    (+) asthma,  (-) COPD, sleep apnea, not a smoker  Cardiovascular   Exercise tolerance: excellent (>7 METS)    (+) hypertension, hyperlipidemia,   (-) pacemaker, past MI, angina, cardiac stents      Neuro/Psych  (+) CVA (RUE weak),     (-) seizures, TIA  GI/Hepatic/Renal/Endo    (-) GERD, liver disease, no renal disease, diabetes    Musculoskeletal     Abdominal    Substance History      OB/GYN          Other   arthritis,    history of cancer                      Anesthesia Plan    ASA 3     MAC     intravenous induction     Anesthetic plan, all risks, benefits, and alternatives have been provided, discussed and informed consent has been obtained with: patient.

## 2021-06-23 NOTE — ANESTHESIA POSTPROCEDURE EVALUATION
Patient: Donya ZAZUETA    Procedure Summary     Date: 06/23/21 Room / Location: [facility] ENDOSCOPY 6 / [facility]    Anesthesia Start: 1346 Anesthesia Stop: 1401    Procedure: ESOPHAGOGASTRODUODENOSCOPY WITH ANESTHESIA (N/A ) Diagnosis:       Other dysphagia      Heartburn      Epigastric pain      Encounter for long-term (current) use of NSAIDs      Gastroesophageal reflux disease, unspecified whether esophagitis present      (Other dysphagia [R13.19])      (Heartburn [R12])      (Epigastric pain [R10.13])      (Encounter for long-term (current) use of NSAIDs [Z79.1])      (Gastroesophageal reflux disease, unspecified whether esophagitis present [K21.9])    Surgeons: Fariha Weber MD Provider: Moriah Becker CRNA    Anesthesia Type: MAC ASA Status: 3          Anesthesia Type: MAC    Vitals  Vitals Value Taken Time   /82 06/23/21 1400   Temp     Pulse 107 06/23/21 1401   Resp     SpO2 98 % 06/23/21 1401   Vitals shown include unvalidated device data.        Post Anesthesia Care and Evaluation    Patient location during evaluation: PHASE II  Patient participation: complete - patient participated  Level of consciousness: awake and alert  Pain management: adequate  Airway patency: patent  Anesthetic complications: No anesthetic complications  PONV Status: none  Cardiovascular status: acceptable  Respiratory status: acceptable  Hydration status: acceptable

## 2021-06-23 NOTE — INTERVAL H&P NOTE
H&P updated. The patient was examined and the following changes are noted:        She is feeling improved now that she has been taking the pantoprazole 40 mg daily and Carafate 4 times a day since June 9.

## 2021-08-12 ENCOUNTER — TELEPHONE (OUTPATIENT)
Dept: OTOLARYNGOLOGY | Facility: CLINIC | Age: 71
End: 2021-08-12

## 2021-12-23 ENCOUNTER — HOSPITAL ENCOUNTER (EMERGENCY)
Facility: HOSPITAL | Age: 71
Discharge: LEFT BEFORE DISCHARGE | End: 2021-12-23

## 2021-12-23 VITALS
HEIGHT: 60 IN | WEIGHT: 128 LBS | RESPIRATION RATE: 20 BRPM | TEMPERATURE: 98.4 F | HEART RATE: 115 BPM | DIASTOLIC BLOOD PRESSURE: 74 MMHG | BODY MASS INDEX: 25.13 KG/M2 | SYSTOLIC BLOOD PRESSURE: 142 MMHG | OXYGEN SATURATION: 94 %

## 2021-12-23 PROCEDURE — 99211 OFF/OP EST MAY X REQ PHY/QHP: CPT

## 2022-03-07 NOTE — PROGRESS NOTES
" DELPHINE Shin  [facility]   Respiratory Disease Clinic  [address] [place] [address]  Phone: [phone]  Fax: [phone]       Chief Complaint  Shortness of Breath, Cough, and post covid 19    Subjective    History of Present Illness    Donya Capellan presents to [facility]   History of Present Illness   Patient presents today as a new referral from Dr. Alvarenga for evaluation of shortness of breath post COVID-19 infection.  The patient reports that she was hospitalized in December for 15 days for treatment of COVID-19 pneumonia.  The patient is unsure whether she received remdesivir or tocilizumab.  She states that she did receive antibiotics and steroids.  She denies requiring mechanical ventilation.  The patient was discharged home with oxygen therapy.    • Shortness of breath, occasionally.  Mask can cause some shortness of breath.  She has an albuterol HFA inhaler and uses it about once a day.  She has oxygen therapy.  Rest and exercise sats were obtained in the office today.  The patient did desaturate to 89%.  • Cough, \"hack\".     • Do you have a history of lung problems, yes, asthma.  Have seen Dr. Elder in past for \"coughing asthma.\"   • Family history of lung problems, yes, mother-COPD.   • Heartburn, yes.  She is taking protonix.      • Trouble swallowing, no.  • Do you have a history of connective tissue disease, no.    • Rash, no. Dry mouth, yes.  Dry eyes, no.  Joint pain, \"athritis in back\".    • Allergies, yes.  She is currently on singulair     • Do you have pets, no.  No carpet, 1 throw rug, no curtains  • The patient  reports that she has never smoked. She has never used smokeless tobacco.  • Do you drink alcohol? On occasion  • Do you use any illegal drugs or prescription drugs that are not prescribed to you? No  • Work history:  for 11 years, [employer], factory work  • Have you ever taken " "Methotrexate? No   • Have you ever taken Amiodarone? No  • Have you ever taken Macrodantin for an extended period of time? No   • Obstructive sleep apnea: She states that she was tested for sleep apnea 12/12/19    Objective   Vital Signs:   /78   Pulse 79   Ht 149.9 cm (59\")   Wt 60.3 kg (133 lb)   SpO2 98% Comment: RA  BMI 26.86 kg/m²     Physical Exam  Vitals reviewed.   Constitutional:       General: She is not in acute distress.     Appearance: She is well-developed and overweight.      Interventions: Face mask in place.   HENT:      Head: Normocephalic and atraumatic.   Eyes:      General: No scleral icterus.     Conjunctiva/sclera: Conjunctivae normal.      Pupils: Pupils are equal, round, and reactive to light.   Cardiovascular:      Rate and Rhythm: Normal rate.   Pulmonary:      Effort: Pulmonary effort is normal. No respiratory distress.      Breath sounds: Normal breath sounds. No wheezing or rales.   Musculoskeletal:         General: Normal range of motion.      Cervical back: Normal range of motion and neck supple.   Skin:     General: Skin is warm and dry.   Neurological:      Mental Status: She is alert and oriented to person, place, and time.   Psychiatric:         Behavior: Behavior normal.         Thought Content: Thought content normal.         Judgment: Judgment normal.        Result Review :  The following data was reviewed by: DELPHINE Shin on 03/08/2022:  XR Chest 2 View (04/06/2021 09:19)    Rest/Exercise Pulse Ox Values        Some values may be hidden. Unless noted otherwise, only the newest values recorded on each date are displayed.         Rest/Exercise Pulse Ox Results 3/8/22   Rest room air SAT % 97   Exercise room air SAT % 89               No results found for this or any previous visit.           Assessment and Plan  Diagnoses and all orders for this visit:    1. Dyspnea, unspecified type (Primary)  -     Full Pulmonary Function Test With Bronchodilator; " "Future  The patient reports a history of \"coughing asthma\" per Dr. Elder.  She uses albuterol HFA as needed.  She is not interested in trialing a maintenance inhaler.  Will obtain pulmonary function test pre and postbronchodilator.  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  Rest and exercise sats were obtained today.  The patient desaturated to 89% in office.  She will continue her portable oxygen as needed.  Obtain CT of the chest without contrast and pulmonary function test pre and postbronchodilator.  Patient denies heart palpitations, fever, chills, cough with purulent sputum.  Patient states that she does want her cardiac status evaluated as well.  She wants to discuss this with her primary care provider and consider referral to cardiology.  3. Gastroesophageal reflux disease, unspecified whether esophagitis present  Continue Protonix  4. Allergic rhinitis, unspecified seasonality, unspecified trigger  Continue Singulair  5. Overweight  Recommend weight loss as this can help improve pulmonary function.    Follow Up  Francia Santos, APRN  3/8/2022  15:02 CST  Return in about 6 weeks (around 4/19/2022) for CPFT P/P, CT.  Patient was given instructions and counseling regarding her condition or for health maintenance advice. Please see specific information pulled into the AVS if appropriate.   Please note that portions of this note were completed with a voice recognition program.  "

## 2022-03-08 ENCOUNTER — OFFICE VISIT (OUTPATIENT)
Dept: PULMONOLOGY | Facility: CLINIC | Age: 72
End: 2022-03-08

## 2022-03-08 VITALS
BODY MASS INDEX: 26.81 KG/M2 | HEART RATE: 79 BPM | OXYGEN SATURATION: 98 % | WEIGHT: 133 LBS | HEIGHT: 59 IN | DIASTOLIC BLOOD PRESSURE: 78 MMHG | SYSTOLIC BLOOD PRESSURE: 124 MMHG

## 2022-03-08 DIAGNOSIS — E66.3 OVERWEIGHT: ICD-10-CM

## 2022-03-08 DIAGNOSIS — J30.9 ALLERGIC RHINITIS, UNSPECIFIED SEASONALITY, UNSPECIFIED TRIGGER: ICD-10-CM

## 2022-03-08 DIAGNOSIS — K21.9 GASTROESOPHAGEAL REFLUX DISEASE, UNSPECIFIED WHETHER ESOPHAGITIS PRESENT: Chronic | ICD-10-CM

## 2022-03-08 DIAGNOSIS — R06.00 DYSPNEA, UNSPECIFIED TYPE: Primary | ICD-10-CM

## 2022-03-08 DIAGNOSIS — U09.9 POST-COVID-19 CONDITION: ICD-10-CM

## 2022-03-08 PROCEDURE — 99214 OFFICE O/P EST MOD 30 MIN: CPT | Performed by: NURSE PRACTITIONER

## 2022-03-08 RX ORDER — LORAZEPAM 0.5 MG/1
1 TABLET ORAL 2 TIMES DAILY PRN
COMMUNITY
Start: 2022-02-12 | End: 2022-04-19 | Stop reason: ALTCHOICE

## 2022-03-08 RX ORDER — ZINC GLUCONATE 50 MG
TABLET ORAL
COMMUNITY

## 2022-03-08 RX ORDER — DILTIAZEM HYDROCHLORIDE 180 MG/1
1 CAPSULE, EXTENDED RELEASE ORAL DAILY
COMMUNITY

## 2022-03-08 NOTE — PROCEDURES
Walking Oximetry  Performed by: Francia Santos APRN  Authorized by: Francia Santos APRN     Rest room air SAT %:  97  Exercise room air SAT %:  89

## 2022-03-17 ENCOUNTER — PATIENT ROUNDING (BHMG ONLY) (OUTPATIENT)
Dept: PULMONOLOGY | Facility: CLINIC | Age: 72
End: 2022-03-17

## 2022-03-17 NOTE — PROGRESS NOTES
March 17, 2022    Hello, may I speak with Donya Capellan?    My name is Rut Benton      I am  with [facility]  [address]  [facility] [address] [phone].    Before we get started may I verify your date of birth? 1950    I am calling to officially welcome you to our practice and ask about your recent visit. Is this a good time to talk? yes    Tell me about your visit with us. What things went well?  Visit went very well.  No wait time got in early.       We're always looking for ways to make our patients' experiences even better. Do you have recommendations on ways we may improve?  no    Overall were you satisfied with your first visit to our practice? yes       I appreciate you taking the time to speak with me today. Is there anything else I can do for you? no      Thank you, and have a great day.

## 2022-04-04 DIAGNOSIS — Z01.818 PREOP TESTING: Primary | ICD-10-CM

## 2022-04-11 ENCOUNTER — HOSPITAL ENCOUNTER (OUTPATIENT)
Dept: PULMONOLOGY | Facility: HOSPITAL | Age: 72
Discharge: HOME | End: 2022-04-11

## 2022-04-11 ENCOUNTER — HOSPITAL ENCOUNTER (OUTPATIENT)
Dept: CT IMAGING | Facility: HOSPITAL | Age: 72
Discharge: HOME | End: 2022-04-11

## 2022-04-11 DIAGNOSIS — U09.9 POST-COVID-19 CONDITION: ICD-10-CM

## 2022-04-11 DIAGNOSIS — R06.00 DYSPNEA, UNSPECIFIED TYPE: ICD-10-CM

## 2022-04-11 PROCEDURE — 71250 CT THORAX DX C-: CPT

## 2022-04-11 PROCEDURE — 94060 EVALUATION OF WHEEZING: CPT | Performed by: INTERNAL MEDICINE

## 2022-04-11 PROCEDURE — 94060 EVALUATION OF WHEEZING: CPT

## 2022-04-11 PROCEDURE — 94726 PLETHYSMOGRAPHY LUNG VOLUMES: CPT | Performed by: INTERNAL MEDICINE

## 2022-04-11 PROCEDURE — 94726 PLETHYSMOGRAPHY LUNG VOLUMES: CPT

## 2022-04-11 PROCEDURE — 94729 DIFFUSING CAPACITY: CPT

## 2022-04-11 PROCEDURE — 94729 DIFFUSING CAPACITY: CPT | Performed by: INTERNAL MEDICINE

## 2022-04-11 RX ORDER — ALBUTEROL SULFATE 2.5 MG/3ML
2.5 SOLUTION RESPIRATORY (INHALATION) ONCE
Status: COMPLETED | OUTPATIENT
Start: 2022-04-11 | End: 2022-04-11

## 2022-04-11 RX ADMIN — ALBUTEROL SULFATE 2.5 MG: 2.5 SOLUTION RESPIRATORY (INHALATION) at 13:32

## 2022-04-18 NOTE — PROGRESS NOTES
" Francia Santos, MSN, APRN, NP-C, JJ, CFRN  [facility]  [address]            [place] [address]  Phone: [phone]  Fax: [phone]          Chief Complaint  dyspnea, unspecified type    Subjective    History of Present Illness    Donya Capellan presents to [facility]IC[facility]   History of Present Illness   The patient presents today for follow-up of shortness of breath post COVID-19 infection December 2021.  The patient is accompanied by her daughter-in-law Larry Frazier.  The patient states that she has continued to improve since her last office visit March 8, 2022.  Her shortness of breath is continue to improve.  She is utilizing chronic oxygen only as needed.  She states that she is not relying on it like she was before.  CT of the chest from April 11, 2022 was reviewed with the patient and her family member today.  The impression showed underlying fibrotic interstitial lung disease characterized by subpleural and basal predominant reticular opacities and mild traction bronchiectasis.  Differential would include postinfectious change.  We discussed that the changes are likely secondary to her post COVID-19 condition.  She continues to use an albuterol HFA inhaler as needed.  Pulmonary function test from April 11, 2022 was reviewed and showed moderate restrictive lung disease.  Patient denies fevers or chills.  No other aggravating or alleviating factors.     Objective   Vital Signs:   /84   Pulse 92   Ht 149.9 cm (59\")   Wt 62.1 kg (137 lb)   SpO2 96% Comment: RA  BMI 27.67 kg/m²     Physical Exam  Vitals reviewed.   Constitutional:       General: She is not in acute distress.     Appearance: She is well-developed and overweight.      Interventions: Face mask in place.   HENT:      Head: Normocephalic and atraumatic.   Eyes:      General: No scleral icterus.     Conjunctiva/sclera: Conjunctivae normal.      Pupils: Pupils " are equal, round, and reactive to light.   Cardiovascular:      Rate and Rhythm: Normal rate.   Pulmonary:      Effort: Pulmonary effort is normal. No respiratory distress.      Breath sounds: Normal breath sounds. No wheezing or rales.   Musculoskeletal:         General: Normal range of motion.      Cervical back: Normal range of motion and neck supple.   Skin:     General: Skin is warm and dry.   Neurological:      Mental Status: She is alert and oriented to person, place, and time.   Psychiatric:         Behavior: Behavior normal.         Thought Content: Thought content normal.         Judgment: Judgment normal.        Result Review :  The following data was reviewed by: DELPHINE Shin on 04/19/2022:  CT Chest Without Contrast Diagnostic (04/11/2022 14:12)  IMPRESSION:  1. No evidence of acute cardiothoracic process.  2. Underlying fibrotic interstitial lung disease characterized by  subpleural and basal-predominant reticular opacities and mild traction  bronchiectasis. Differential would include postinfectious change.  This report was finalized on 04/11/2022 15:10 by Dr Ty Berman, .  Rest/Exercise Pulse Ox Values        Some values may be hidden. Unless noted otherwise, only the newest values recorded on each date are displayed.         Rest/Exercise Pulse Ox Results 3/8/22   Rest room air SAT % 97   Exercise room air SAT % 89               Results for orders placed during the hospital encounter of 04/11/22    Full Pulmonary Function Test With Bronchodilator    Narrative  [facility] - Pulmonary Function Test    [address] [place] [address]  [id number]  [phone]    Patient : Donya Capellan  MRN : [id number]  CSN : [id number]  Pulmonologist : Eder Mcmahan MD  Date : 4/11/2022    ______________________________________________________________________    Interpretation :  1.  Spirometry is consistent with a moderate restrictive ventilatory defect with a coexisting decrease in  midflows and peak expiratory flow.  2.  There is improvement in the patient's midflows and peak expiratory flows postbronchodilator so that midflows are now normal.  Peak expiratory flow is still diminished.  There otherwise is no significant change in spirometry postbronchodilator.  3.  Lung volumes confirm a moderate restrictive ventilatory defect.  There is also a decrease in inspiratory capacity.  4.  There is a moderate diffusion impairment which when corrected for alveolar volume is normalized.      Eder Mcmahan MD           Assessment and Plan  Diagnoses and all orders for this visit:    1. Bronchiectasis without acute exacerbation (HCC) (Primary)  Assessment & Plan:  Bronchiectasis noted per CT of the chest April 11, 2022.  Handouts regarding bronchiectasis were provided to the patient and her family member.  Bronchiectatic changes are likely secondary to her her post COVID-19 condition.  She does have a family history of connective tissue disease.  Will obtain CTD labs as well as immunoglobulins.    Orders:  -     MULU With / DsDNA, RNP, Sjogrens A / B, [place]  -     ANCA Panel  -     IgG subclasses (1-4)  -     IgE  -     IgG  -     IgM  -     IgA  -     Rheumatoid Factor  -     Alpha - 1 - Antitrypsin; Future    2. Gastroesophageal reflux disease, unspecified whether esophagitis present  Comments:  Continue Protonix  Overview:  Added automatically from request for surgery [id number]      3. Overweight  Comments:  Diet education provided via AVS.    4. Allergic rhinitis, unspecified seasonality, unspecified trigger  Comments:  Continue Singulair    5. Restrictive lung disease  Comments:  Likely secondary to post COVID-19 lung changes and overweight status.      Follow Up  Francia Santos, DELPHINE  4/19/2022  13:44 CDT  Return in about 6 months (around 10/19/2022).  Patient was given instructions and counseling regarding her condition or for health maintenance advice. Please see specific information  pulled into the AVS if appropriate.   Please note that portions of this note were completed with a voice recognition program.

## 2022-04-19 ENCOUNTER — OFFICE VISIT (OUTPATIENT)
Dept: PULMONOLOGY | Facility: CLINIC | Age: 72
End: 2022-04-19

## 2022-04-19 VITALS
WEIGHT: 137 LBS | HEART RATE: 92 BPM | OXYGEN SATURATION: 96 % | SYSTOLIC BLOOD PRESSURE: 142 MMHG | BODY MASS INDEX: 27.62 KG/M2 | HEIGHT: 59 IN | DIASTOLIC BLOOD PRESSURE: 84 MMHG

## 2022-04-19 DIAGNOSIS — R76.8 ANA POSITIVE: ICD-10-CM

## 2022-04-19 DIAGNOSIS — J30.9 ALLERGIC RHINITIS, UNSPECIFIED SEASONALITY, UNSPECIFIED TRIGGER: Chronic | ICD-10-CM

## 2022-04-19 DIAGNOSIS — J98.4 RESTRICTIVE LUNG DISEASE: ICD-10-CM

## 2022-04-19 DIAGNOSIS — J47.9 BRONCHIECTASIS WITHOUT ACUTE EXACERBATION: Primary | ICD-10-CM

## 2022-04-19 DIAGNOSIS — E66.3 OVERWEIGHT: Chronic | ICD-10-CM

## 2022-04-19 DIAGNOSIS — K21.9 GASTROESOPHAGEAL REFLUX DISEASE, UNSPECIFIED WHETHER ESOPHAGITIS PRESENT: Chronic | ICD-10-CM

## 2022-04-19 PROCEDURE — 99214 OFFICE O/P EST MOD 30 MIN: CPT | Performed by: NURSE PRACTITIONER

## 2022-04-19 PROCEDURE — 36415 COLL VENOUS BLD VENIPUNCTURE: CPT | Performed by: NURSE PRACTITIONER

## 2022-04-19 RX ORDER — NORTRIPTYLINE HYDROCHLORIDE 10 MG/1
10 CAPSULE ORAL 4 TIMES DAILY
COMMUNITY

## 2022-04-19 NOTE — ASSESSMENT & PLAN NOTE
Bronchiectasis noted per CT of the chest April 11, 2022.  Handouts regarding bronchiectasis were provided to the patient and her family member.  Bronchiectatic changes are likely secondary to her her post COVID-19 condition.  She does have a family history of connective tissue disease.  Will obtain CTD labs as well as immunoglobulins.

## 2022-04-19 NOTE — PATIENT INSTRUCTIONS
Gastroesophageal Reflux Disease, Adult    Gastroesophageal reflux (JENNIE) happens when acid from the stomach flows up into the tube that connects the mouth and the stomach (esophagus). Normally, food travels down the esophagus and stays in the stomach to be digested. With JENNIE, food and stomach acid sometimes move back up into the esophagus.  You may have a disease called gastroesophageal reflux disease (GERD) if the reflux:  Happens often.  Causes frequent or very bad symptoms.  Causes problems such as damage to the esophagus.  When this happens, the esophagus becomes sore and swollen. Over time, GERD can make small holes (ulcers) in the lining of the esophagus.  What are the causes?  This condition is caused by a problem with the muscle between the esophagus and the stomach. When this muscle is weak or not normal, it does not close properly to keep food and acid from coming back up from the stomach.  The muscle can be weak because of:  Tobacco use.  Pregnancy.  Having a certain type of hernia (hiatal hernia).  Alcohol use.  Certain foods and drinks, such as coffee, chocolate, onions, and peppermint.  What increases the risk?  Being overweight.  Having a disease that affects your connective tissue.  Taking NSAIDs, such a ibuprofen.  What are the signs or symptoms?  Heartburn.  Difficult or painful swallowing.  The feeling of having a lump in the throat.  A bitter taste in the mouth.  Bad breath.  Having a lot of saliva.  Having an upset or bloated stomach.  Burping.  Chest pain. Different conditions can cause chest pain. Make sure you see your doctor if you have chest pain.  Shortness of breath or wheezing.  A long-term cough or a cough at night.  Wearing away of the surface of teeth (tooth enamel).  Weight loss.  How is this treated?  Making changes to your diet.  Taking medicine.  Having surgery.  Treatment will depend on how bad your symptoms are.  Follow these instructions at home:  Eating and drinking    Follow a  diet as told by your doctor. You may need to avoid foods and drinks such as:  Coffee and tea, with or without caffeine.  Drinks that contain alcohol.  Energy drinks and sports drinks.  Bubbly (carbonated) drinks or sodas.  Chocolate and cocoa.  Peppermint and mint flavorings.  Garlic and onions.  Horseradish.  Spicy and acidic foods. These include peppers, chili powder, quiles powder, vinegar, hot sauces, and BBQ sauce.  Citrus fruit juices and citrus fruits, such as oranges, shad, and limes.  Tomato-based foods. These include red sauce, chili, salsa, and pizza with red sauce.  Fried and fatty foods. These include donuts, french fries, potato chips, and high-fat dressings.  High-fat meats. These include hot dogs, rib eye steak, sausage, ham, and martinez.  High-fat dairy items, such as whole milk, butter, and cream cheese.  Eat small meals often. Avoid eating large meals.  Avoid drinking large amounts of liquid with your meals.  Avoid eating meals during the 2-3 hours before bedtime.  Avoid lying down right after you eat.  Do not exercise right after you eat.    Lifestyle    Do not smoke or use any products that contain nicotine or tobacco. If you need help quitting, ask your doctor.  Try to lower your stress. If you need help doing this, ask your doctor.  If you are overweight, lose an amount of weight that is healthy for you. Ask your doctor about a safe weight loss goal.    General instructions  Pay attention to any changes in your symptoms.  Take over-the-counter and prescription medicines only as told by your doctor.  Do not take aspirin, ibuprofen, or other NSAIDs unless your doctor says it is okay.  Wear loose clothes. Do not wear anything tight around your waist.  Raise (elevate) the head of your bed about 6 inches (15 cm). You may need to use a wedge to do this.  Avoid bending over if this makes your symptoms worse.  Keep all follow-up visits.  Contact a doctor if:  You have new symptoms.  You lose weight and  you do not know why.  You have trouble swallowing or it hurts to swallow.  You have wheezing or a cough that keeps happening.  You have a hoarse voice.  Your symptoms do not get better with treatment.  Get help right away if:  You have sudden pain in your arms, neck, jaw, teeth, or back.  You suddenly feel sweaty, dizzy, or light-headed.  You have chest pain or shortness of breath.  You vomit and the vomit is green, yellow, or black, or it looks like blood or coffee grounds.  You faint.  Your poop (stool) is red, bloody, or black.  You cannot swallow, drink, or eat.  These symptoms may represent a serious problem that is an emergency. Do not wait to see if the symptoms will go away. Get medical help right away. Call your local emergency services (911 in the [place] Do not drive yourself to the hospital.  Summary  If a person has gastroesophageal reflux disease (GERD), food and stomach acid move back up into the esophagus and cause symptoms or problems such as damage to the esophagus.  Treatment will depend on how bad your symptoms are.  Follow a diet as told by your doctor.  Take all medicines only as told by your doctor.  This information is not intended to replace advice given to you by your health care provider. Make sure you discuss any questions you have with your health care provider.  Document Revised: 06/28/2021 Document Reviewed: 06/28/2021  [employer] Patient Education © 2021 [employer]  Preventing Unhealthy Weight Gain, Adult  Staying at a healthy weight is important to your overall health. When fat builds up in your body, you may become overweight or obese. Being overweight or obese increases your risk of developing certain health problems, such as heart disease, diabetes, sleeping problems, joint problems, and some types of cancer.  Unhealthy weight gain is often the result of making unhealthy food choices or not getting enough exercise. You can make changes to your lifestyle to prevent obesity and stay  as healthy as possible.  What nutrition changes can be made?    Eat only as much as your body needs. To do this:  Pay attention to signs that you are hungry or full. Stop eating as soon as you feel full.  If you feel hungry, try drinking water first before eating. Drink enough water so your urine is clear or pale yellow.  Eat smaller portions. Pay attention to portion sizes when eating out.  Look at serving sizes on food labels. Most foods contain more than one serving per container.  Eat the recommended number of calories for your gender and activity level. For most active people, a daily total of 2,000 calories is appropriate. If you are trying to lose weight or are not very active, you may need to eat fewer calories. Talk with your health care provider or a diet and nutrition specialist (dietitian) about how many calories you need each day.  Choose healthy foods, such as:  Fruits and vegetables. At each meal, try to fill at least half of your plate with fruits and vegetables.  Whole grains, such as whole-wheat bread, brown rice, and quinoa.  Lean meats, such as chicken or fish.  Other healthy proteins, such as beans, eggs, or tofu.  Healthy fats, such as nuts, seeds, fatty fish, and olive oil.  Low-fat or fat-free dairy products.  Check food labels, and avoid food and drinks that:  Are high in calories.  Have added sugar.  Are high in sodium.  Have saturated fats or trans fats.  Cook foods in healthier ways, such as by baking, broiling, or grilling.  Make a meal plan for the week, and shop with a grocery list to help you stay on track with your purchases. Try to avoid going to the grocery store when you are hungry.  When grocery shopping, try to shop around the outside of the store first, where the fresh foods are. Doing this helps you to avoid prepackaged foods, which can be high in sugar, salt (sodium), and fat.  What lifestyle changes can be made?    Exercise for 30 or more minutes on 5 or more days each week.  Exercising may include brisk walking, yard work, biking, running, swimming, and team sports like basketball and soccer. Ask your health care provider which exercises are safe for you.  Do muscle-strengthening activities, such as lifting weights or using resistance bands, on 2 or more days a week.  Do not use any products that contain nicotine or tobacco, such as cigarettes and e-cigarettes. If you need help quitting, ask your health care provider.  Limit alcohol intake to no more than 1 drink a day for nonpregnant women and 2 drinks a day for men. One drink equals 12 oz of beer, 5 oz of wine, or 1½ oz of hard liquor.  Try to get 7-9 hours of sleep each night.  What other changes can be made?  Keep a food and activity journal to keep track of:  What you ate and how many calories you had. Remember to count the calories in sauces, dressings, and side dishes.  Whether you were active, and what exercises you did.  Your calorie, weight, and activity goals.  Check your weight regularly. Track any changes. If you notice you have gained weight, make changes to your diet or activity routine.  Avoid taking weight-loss medicines or supplements. Talk to your health care provider before starting any new medicine or supplement.  Talk to your health care provider before trying any new diet or exercise plan.  Why are these changes important?  Eating healthy, staying active, and having healthy habits can help you to prevent obesity. Those changes also:  Help you manage stress and emotions.  Help you connect with friends and family.  Improve your self-esteem.  Improve your sleep.  Prevent long-term health problems.  What can happen if changes are not made?  Being obese or overweight can cause you to develop joint or bone problems, which can make it hard for you to stay active or do activities you enjoy. Being obese or overweight also puts stress on your heart and lungs and can lead to health problems like diabetes, heart disease, and  some cancers.  Where to find more information  Talk with your health care provider or a dietitian about healthy eating and healthy lifestyle choices. You may also find information from:  [place] Department of Agriculture, MyPlate: www.choosemyplate.gov  American Heart Association: www.heart.org  Centers for Disease Control and Prevention: www.cdc.gov  Summary  Staying at a healthy weight is important to your overall health. It helps you to prevent certain diseases and health problems, such as heart disease, diabetes, joint problems, sleep disorders, and some types of cancer.  Being obese or overweight can cause you to develop joint or bone problems, which can make it hard for you to stay active or do activities you enjoy.  You can prevent unhealthy weight gain by eating a healthy diet, exercising regularly, not smoking, limiting alcohol, and getting enough sleep.  Talk with your health care provider or a dietitian for guidance about healthy eating and healthy lifestyle choices.  This information is not intended to replace advice given to you by your health care provider. Make sure you discuss any questions you have with your health care provider.  Document Revised: 04/15/2021 Document Reviewed: 04/15/2021  [employer] Patient Education © 2021 [employer]  Bronchiectasis    Bronchiectasis is a condition in which the airways in the lungs (bronchi) are damaged and widened. The condition makes it hard for the lungs to get rid of mucus, and it causes mucus to gather in the bronchi. This condition often leads to lung infections, which can make the condition worse.  What are the causes?  You can be born with this condition or you can develop it later in life. Common causes of this condition include:  Cystic fibrosis.  Repeated lung infections, such as pneumonia or tuberculosis.  An object or other blockage in the lungs.  Breathing in fluid, food, or other objects (aspiration).  A problem with the immune system and lung  structure that is present at birth (congenital).  Sometimes the cause is not known.  What are the signs or symptoms?  Common symptoms of this condition include:  A daily cough that brings up mucus and lasts for more than 3 weeks.  Lung infections that happen often.  Shortness of breath and wheezing.  Weakness and fatigue.  How is this diagnosed?  This condition is diagnosed with tests, such as:  Chest X-rays or CT scans. These are done to check for changes in the lungs.  Breathing tests. These are done to check how well your lungs are working.  A test of a sample of your saliva (sputum culture). This test is done to check for infection.  Blood tests and other tests. These are done to check for related diseases or causes.  How is this treated?  Treatment for this condition depends on the severity of the illness and its cause. Treatment may include:  Medicines that loosen mucus so it can be coughed up (mucolytics).  Medicines that relax the muscles of the bronchi (bronchodilators).  Antibiotic medicines to prevent or treat infection.  Physical therapy to help clear mucus from the lungs. Techniques may include:  Postural drainage. This is when you sit or lie in certain positions so that mucus can drain by gravity.  Chest percussion. This involves tapping the chest or back with a cupped hand.  Chest vibration. For this therapy, a hand or special equipment vibrates your chest and back.  Surgery to remove the affected part of the lung. This may be done in severe cases.  Follow these instructions at home:  Medicines  Take over-the-counter and prescription medicines only as told by your health care provider.  If you were prescribed an antibiotic medicine, take it as told by your health care provider. Do not stop taking the antibiotic even if you start to feel better.  Avoid taking sedatives and antihistamines unless your health care provider tells you to take them. These medicines tend to thicken the mucus in the  lungs.  Managing symptoms  Perform breathing exercises or techniques to clear your lungs as told by your health care provider.  Consider using a cold steam vaporizer or humidifier in your room or home to help loosen secretions.  If you have a cough that gets worse at night, try sleeping in a semi-upright position.  General instructions  Get plenty of rest.  Drink enough fluid to keep your urine clear or pale yellow.  Stay inside when pollution and ozone levels are high.  Stay up to date with vaccinations and immunizations.  Avoid cigarette smoke and other lung irritants.  Do not use any products that contain nicotine or tobacco, such as cigarettes and e-cigarettes. If you need help quitting, ask your health care provider.  Keep all follow-up visits as told by your health care provider. This is important.  Contact a health care provider if:  You cough up more sputum than before and the sputum is yellow or green in color.  You have a fever.  You cannot control your cough and are losing sleep.  Get help right away if:  You cough up blood.  You have chest pain.  You have increasing shortness of breath.  You have pain that gets worse or is not controlled with medicines.  You have a fever and your symptoms suddenly get worse.  Summary  Bronchiectasis is a condition in which the airways in the lungs (bronchi) are damaged and widened. The condition makes it hard for the lungs to get rid of mucus, and it causes mucus to gather in the bronchi.  Treatment usually includes therapy to help clear mucus from the lungs.  Avoid cigarette smoke and other lung irritants.  Stay up to date with vaccinations and immunizations.  This information is not intended to replace advice given to you by your health care provider. Make sure you discuss any questions you have with your health care provider.  Document Revised: 08/19/2021 Document Reviewed: 08/19/2021  [employer] Patient Education © 2021 [employer]

## 2022-04-27 LAB
ANA SER QL: POSITIVE
C-ANCA TITR SER IF: NORMAL TITER
CENTROMERE B AB SER-ACNC: <0.2 AI (ref 0–0.9)
CHROMATIN AB SERPL-ACNC: <0.2 AI (ref 0–0.9)
DSDNA AB SER-ACNC: 10 IU/ML (ref 0–9)
ENA JO1 AB SER-ACNC: <0.2 AI (ref 0–0.9)
ENA RNP AB SER-ACNC: 0.5 AI (ref 0–0.9)
ENA SCL70 AB SER-ACNC: <0.2 AI (ref 0–0.9)
ENA SM AB SER-ACNC: <0.2 AI (ref 0–0.9)
ENA SS-A AB SER-ACNC: <0.2 AI (ref 0–0.9)
ENA SS-B AB SER-ACNC: <0.2 AI (ref 0–0.9)
IGA SERPL-MCNC: 205 MG/DL (ref 64–422)
IGE SERPL-ACNC: 104 IU/ML (ref 6–495)
IGG SERPL-MCNC: 1207 MG/DL (ref 586–1602)
IGG1 SER-MCNC: 683 MG/DL (ref 248–810)
IGG2 SER-MCNC: 150 MG/DL (ref 130–555)
IGG3 SER-MCNC: 106 MG/DL (ref 15–102)
IGG4 SER-MCNC: 87 MG/DL (ref 2–96)
IGM SERPL-MCNC: 45 MG/DL (ref 26–217)
Lab: ABNORMAL
MYELOPEROXIDASE AB SER IA-ACNC: <9 U/ML (ref 0–9)
P-ANCA ATYPICAL TITR SER IF: NORMAL TITER
P-ANCA TITR SER IF: NORMAL TITER
PROTEINASE3 AB SER IA-ACNC: <3.5 U/ML (ref 0–3.5)
RHEUMATOID FACT SERPL-ACNC: 12.4 IU/ML

## 2022-06-09 DIAGNOSIS — K21.9 GASTROESOPHAGEAL REFLUX DISEASE, UNSPECIFIED WHETHER ESOPHAGITIS PRESENT: Primary | ICD-10-CM

## 2022-06-09 RX ORDER — PANTOPRAZOLE SODIUM 40 MG/1
TABLET, DELAYED RELEASE ORAL
Qty: 90 TABLET | Refills: 0 | Status: SHIPPED | OUTPATIENT
Start: 2022-06-09

## 2022-06-09 NOTE — TELEPHONE ENCOUNTER
Rx Refill Note  Requested Prescriptions     Pending Prescriptions Disp Refills   • pantoprazole (PROTONIX) 40 MG EC tablet [Pharmacy Med Name: Pantoprazole Sodium 40 MG Oral Tablet Delayed Release] 90 tablet 0     Sig: Take 1 tablet by mouth once daily      Last office visit with prescribing clinician: 6/9/2021      Next office visit with prescribing clinician: Visit date not found     Pt due for yearly OV-pended 1 90-day refill to Dr. Weber to last until pt can schedule appt.              Ling Romero MA  06/09/22, 10:15 CDT

## 2022-09-08 DIAGNOSIS — K21.9 GASTROESOPHAGEAL REFLUX DISEASE, UNSPECIFIED WHETHER ESOPHAGITIS PRESENT: ICD-10-CM

## 2022-09-08 RX ORDER — PANTOPRAZOLE SODIUM 40 MG/1
TABLET, DELAYED RELEASE ORAL
Qty: 90 TABLET | Refills: 0 | OUTPATIENT
Start: 2022-09-08

## 2022-10-19 PROBLEM — J84.9 ILD (INTERSTITIAL LUNG DISEASE) (HCC): Chronic | Status: ACTIVE | Noted: 2022-10-19

## 2022-10-19 PROBLEM — J98.4 RESTRICTIVE LUNG DISEASE: Chronic | Status: ACTIVE | Noted: 2022-04-19

## 2022-10-19 PROBLEM — J84.9 ILD (INTERSTITIAL LUNG DISEASE) (HCC): Status: ACTIVE | Noted: 2022-10-19

## 2022-11-01 NOTE — PROGRESS NOTES
Francia Santos, MSN, APRN, NP-C, JJ, CFRN  [facility]  [address]            [place] [address]  Phone: [phone]  Fax: [phone]          Chief Complaint  Bronchiectasis without acute exacerbation     Subjective    History of Present Illness    Donya Capellan presents to [facility]   History of Present Illness   The patient presents today for follow-up of shortness of breath.  CT of the chest April 11, 2022 identified Underlying fibrotic interstitial lung disease characterized by subpleural and basal-predominant reticular opacities and mild traction bronchiectasis.  The changes could be related to her COVID-19 infection from December 2021.  However, will obtain follow-up HRCT.  CTD labs and immunoglobulins were obtained 4/19/2022.  The patient was found to have a positive MULU with elevated anti-DNA.  She was noted to have a mildly elevated IgG subclass 3.  Patient was referred to Dr. Rachel, but has not yet had that appointment scheduled.  He is agreeable for the appointment to be scheduled.  The patient states that she completed an antibiotic on Tuesday (2 days ago) that was prescribed by her PCP.  She does not know the name of it.  She is complaining of a cough that has been ongoing for 3 to 4 months, chest soreness, and pain under the right breast that has been ongoing for a few weeks.  She states that it hurts worse when she coughs and breaths deeply.  I recommend ER evaluation for any worsening or CP.  She voiced understanding.  She states that she thinks her cough is related to her sinus drainage.  She states that her sinuses are draining all the time.  She is only utilizing Singulair at this time.  I discussed adding an ICS nasal spray as well as an over-the-counter antihistamine.  The patient states that she would not be able to afford those.  Therefore, I try to send them to the pharmacy for the patient.  She denies  "fever or chills.  She states that her cough is non-productive.  No other aggravating or alleviating factors.     Objective   Vital Signs:   /72   Pulse 103   Ht 149.9 cm (59\")   Wt 60.7 kg (133 lb 12.8 oz)   SpO2 96%   BMI 27.02 kg/m²     Physical Exam  Vitals reviewed.   Constitutional:       General: She is not in acute distress.     Appearance: She is well-developed and overweight.      Interventions: Face mask in place.   HENT:      Head: Normocephalic and atraumatic.   Eyes:      General: No scleral icterus.     Conjunctiva/sclera: Conjunctivae normal.      Pupils: Pupils are equal, round, and reactive to light.   Cardiovascular:      Rate and Rhythm: Normal rate.   Pulmonary:      Effort: Pulmonary effort is normal. No respiratory distress.      Breath sounds: Normal breath sounds. No wheezing or rales.   Musculoskeletal:         General: Normal range of motion.      Cervical back: Normal range of motion and neck supple.   Skin:     General: Skin is warm and dry.   Neurological:      Mental Status: She is alert and oriented to person, place, and time.   Psychiatric:         Behavior: Behavior normal.         Thought Content: Thought content normal.         Judgment: Judgment normal.        Result Review :  The following data was reviewed by: DELPHINE Shin on 11/03/2022:  CT Chest Without Contrast Diagnostic (04/11/2022 14:12)  IMPRESSION:  1. No evidence of acute cardiothoracic process.  2. Underlying fibrotic interstitial lung disease characterized by subpleural and basal-predominant reticular opacities and mild traction bronchiectasis. Differential would include postinfectious change.  This report was finalized on 04/11/2022 15:10 by Dr Ty Berman, .  Rest/Exercise Pulse Ox Values        Some values may be hidden. Unless noted otherwise, only the newest values recorded on each date are displayed.         Rest/Exercise Pulse Ox Results 3/8/22   Rest room air SAT % 97   Exercise room " air SAT % 89               Results for orders placed during the hospital encounter of 04/11/22    Full Pulmonary Function Test With Bronchodilator    Narrative  [facility] - Pulmonary Function Test    [facility]  [place]  [address]  [phone]    Patient : Donya Capellan  MRN : [id number]  CSN : [id number]  Pulmonologist : Eder Mcmahan MD  Date : 4/11/2022    ______________________________________________________________________    Interpretation :  1.  Spirometry is consistent with a moderate restrictive ventilatory defect with a coexisting decrease in midflows and peak expiratory flow.  2.  There is improvement in the patient's midflows and peak expiratory flows postbronchodilator so that midflows are now normal.  Peak expiratory flow is still diminished.  There otherwise is no significant change in spirometry postbronchodilator.  3.  Lung volumes confirm a moderate restrictive ventilatory defect.  There is also a decrease in inspiratory capacity.  4.  There is a moderate diffusion impairment which when corrected for alveolar volume is normalized.      Eder Mcmahan MD           Assessment and Plan  Diagnoses and all orders for this visit:    1. ILD (interstitial lung disease) (HCC) (Primary)  Overview:  4/11/22 - Underlying fibrotic interstitial lung disease characterized by subpleural and basal-predominant reticular opacities and mild traction bronchiectasis.    4/19/22 - ANCA - negative; +MULU and elevated antiDNA - referred to Dr. Rachel     Assessment & Plan:  Obtain f/u HRCT.  Schedule appointment with Dr. Rachel.      Orders:  -     CT Chest Hi Resolution Diagnostic; Future    2. Allergic rhinitis, unspecified seasonality, unspecified trigger  Overview:  Singulair    Assessment & Plan:  Begin flonase and xyzal.  Robitussin DM as sent to pharmacy to have as needed for cough.     Orders:  -     fluticasone (Flonase) 50 MCG/ACT nasal spray; 2 sprays into the nostril(s) as  directed by provider Daily for 30 days.  Dispense: 16 g; Refill: 11  -     levocetirizine (XYZAL) 5 MG tablet; Take 1 tablet by mouth Every Evening for 30 days.  Dispense: 30 tablet; Refill: 11  -     guaiFENesin-dextromethorphan (ROBITUSSIN DM) 100-10 MG/5ML syrup; Take 10 mL by mouth 3 (Three) Times a Day As Needed for Cough or Congestion for up to 30 days.  Dispense: 473 mL; Refill: 4    3. Bronchiectasis without acute exacerbation (HCC)  Overview:  CT chest 4/11/22 - Underlying fibrotic interstitial lung disease characterized by subpleural and basal-predominant reticular opacities and mild traction bronchiectasis.     4/19/22 - IgA, IgM, IgE, IgG - WNL; mildly elevated IgG subclass 3; all other subclasses WNL    Albuterol HFA prn       Assessment & Plan:  Begin trial of Trelegy.  Inhaler demonstration provided.        4. Restrictive lung disease  Overview:  Likely 2' ILD      5. Pleurisy  Comments:  Complete prednisone taper. If chest pain develops, go to ER immediately. She voice understanding.   Orders:  -     predniSONE (DELTASONE) 10 MG tablet; Take 4 tabs daily x 3 days, then take 3 tabs daily x 3 days, then take 2 tabs daily x 3 days, then take 1 tab daily x 3 days  Dispense: 31 tablet; Refill: 0    6. Gastroesophageal reflux disease, unspecified whether esophagitis present  Overview:  PPI-protonix     Assessment & Plan:  Continue current treatment regimen.       7. Overweight  Assessment & Plan:  Patient's (Body mass index is 27.02 kg/m².) Recommend weight loss.  Defer to PCP.         Follow Up  Francia Santos, DELPHINE  11/3/2022  15:10 CDT  Return in about 3 weeks (around 11/24/2022) for CT.  Patient was given instructions and counseling regarding her condition or for health maintenance advice. Please see specific information pulled into the AVS if appropriate.   Please note that portions of this note were completed with a voice recognition program.

## 2022-11-03 ENCOUNTER — OFFICE VISIT (OUTPATIENT)
Dept: PULMONOLOGY | Facility: CLINIC | Age: 72
End: 2022-11-03

## 2022-11-03 VITALS
WEIGHT: 133.8 LBS | DIASTOLIC BLOOD PRESSURE: 72 MMHG | OXYGEN SATURATION: 96 % | HEIGHT: 59 IN | SYSTOLIC BLOOD PRESSURE: 124 MMHG | BODY MASS INDEX: 26.97 KG/M2 | HEART RATE: 103 BPM

## 2022-11-03 DIAGNOSIS — R09.1 PLEURISY: ICD-10-CM

## 2022-11-03 DIAGNOSIS — J47.9 BRONCHIECTASIS WITHOUT ACUTE EXACERBATION: Chronic | ICD-10-CM

## 2022-11-03 DIAGNOSIS — E66.3 OVERWEIGHT: Chronic | ICD-10-CM

## 2022-11-03 DIAGNOSIS — J98.4 RESTRICTIVE LUNG DISEASE: Chronic | ICD-10-CM

## 2022-11-03 DIAGNOSIS — K21.9 GASTROESOPHAGEAL REFLUX DISEASE, UNSPECIFIED WHETHER ESOPHAGITIS PRESENT: Chronic | ICD-10-CM

## 2022-11-03 DIAGNOSIS — J30.9 ALLERGIC RHINITIS, UNSPECIFIED SEASONALITY, UNSPECIFIED TRIGGER: Chronic | ICD-10-CM

## 2022-11-03 DIAGNOSIS — J84.9 ILD (INTERSTITIAL LUNG DISEASE): Primary | Chronic | ICD-10-CM

## 2022-11-03 PROCEDURE — 99214 OFFICE O/P EST MOD 30 MIN: CPT | Performed by: NURSE PRACTITIONER

## 2022-11-03 RX ORDER — PREDNISONE 10 MG/1
TABLET ORAL
Qty: 31 TABLET | Refills: 0 | Status: SHIPPED | OUTPATIENT
Start: 2022-11-03

## 2022-11-03 RX ORDER — GUAIFENESIN/DEXTROMETHORPHAN 100-10MG/5
10 SYRUP ORAL 3 TIMES DAILY PRN
Qty: 473 ML | Refills: 4 | Status: SHIPPED | OUTPATIENT
Start: 2022-11-03 | End: 2022-12-03

## 2022-11-03 RX ORDER — DILTIAZEM HYDROCHLORIDE 180 MG/1
1 CAPSULE, COATED, EXTENDED RELEASE ORAL DAILY
COMMUNITY
Start: 2022-10-22

## 2022-11-03 RX ORDER — LEVOCETIRIZINE DIHYDROCHLORIDE 5 MG/1
5 TABLET, FILM COATED ORAL EVERY EVENING
Qty: 30 TABLET | Refills: 11 | Status: SHIPPED | OUTPATIENT
Start: 2022-11-03 | End: 2022-12-03

## 2022-11-03 RX ORDER — FLUTICASONE PROPIONATE 50 MCG
2 SPRAY, SUSPENSION (ML) NASAL DAILY
Qty: 16 G | Refills: 11 | Status: SHIPPED | OUTPATIENT
Start: 2022-11-03 | End: 2022-12-03

## 2022-11-03 NOTE — PATIENT INSTRUCTIONS
Bronchiectasis  Bronchiectasis is a condition in which the airways in the lungs (bronchi) are damaged and widened. The condition makes it hard for the lungs to get rid of mucus, and it causes mucus to gather in the bronchi. This condition often leads to lung infections, which can make the condition worse.  What are the causes?  You can be born with this condition, or you can develop it later in life. Common causes of this condition include:  Cystic fibrosis.  Repeated lung infections, such as pneumonia or tuberculosis.  An object or other blockage in the lungs.  Breathing in fluid, food, or other objects (aspiration).  A problem with the body's defense system (immune system) and lung structure that is present at birth (congenital).  Sometimes the cause is not known.  What are the signs or symptoms?  Common symptoms of this condition include:  A daily cough that brings up mucus and lasts for more than 3 weeks.  Lung infections that happen often.  Shortness of breath and wheezing.  Weakness and feeling tired (fatigue).  How is this diagnosed?  This condition is diagnosed with tests, such as:  Chest X-rays or CT scans. These are done to check for changes in the lungs.  Breathing tests. These are done to check how well your lungs are working.  A test of a sample of your saliva (sputum culture). This test is done to check for infection.  Blood tests and other tests. These are done to check for related diseases or causes.  How is this treated?  Treatment for this condition depends on the severity of the illness and its cause. Treatment may include:  Medicines that loosen mucus so it can be coughed up (mucolytics).  Medicines that relax the muscles of the bronchi (bronchodilators).  Antibiotic medicines to prevent or treat infection.  Physical therapy to help clear mucus from the lungs. Techniques may include:  Postural drainage. This is when you sit or lie in certain positions so that mucus can drain by gravity.  Chest  percussion. This involves tapping the chest or back with a cupped hand.  Chest vibration. For this therapy, a hand or special equipment vibrates your chest and back.  Surgery to remove the affected part of the lung. This may be done in severe cases.  Follow these instructions at home:  Medicines  Take over-the-counter and prescription medicines only as told by your health care provider.  If you were prescribed an antibiotic medicine, take it as told by your health care provider. Do not stop taking the antibiotic even if you start to feel better.  Avoid taking sedatives and antihistamines unless your health care provider tells you to take them. These medicines tend to thicken the mucus in the lungs.  Managing symptoms  Do breathing exercises or techniques to clear your lungs as told by your health care provider.  Consider using a cold steam vaporizer or humidifier in your room or home to help loosen secretions.  If you have a cough that gets worse at night, try sleeping in a semi-upright position.  General instructions  Get plenty of rest.  Drink enough fluid to keep your urine pale yellow.  Stay inside when pollution and ozone levels are high.  Stay up to date with vaccinations and immunizations.  Avoid cigarette smoke and other lung irritants.  Do not use any products that contain nicotine or tobacco. These products include cigarettes, chewing tobacco, and vaping devices, such as e-cigarettes. If you need help quitting, ask your health care provider.  Keep all follow-up visits. This is important.  Contact a health care provider if:  You cough up more sputum than before and the sputum is yellow or green in color.  You have a fever or chills.  You cannot control your cough and are losing sleep.  Get help right away if:  You cough up blood.  You have chest pain.  You have increasing shortness of breath.  You have pain that gets worse or is not controlled with medicines.  You have a fever and your symptoms suddenly get  worse.  These symptoms may be an emergency. Get help right away. Call 911.  Do not wait to see if the symptoms will go away.  Do not drive yourself to the hospital.  Summary  Bronchiectasis is a condition in which the airways in the lungs (bronchi) are damaged and widened. The condition makes it hard for the lungs to get rid of mucus, and it causes mucus to gather in the bronchi.  Treatment usually includes therapy to help clear mucus from the lungs.  Avoid cigarette smoke and other lung irritants.  Stay up to date with vaccinations and immunizations.  This information is not intended to replace advice given to you by your health care provider. Make sure you discuss any questions you have with your health care provider.  Document Revised: 08/31/2022 Document Reviewed: 07/19/2022  [employer] Patient Education © 2022 [employer]  Gastroesophageal Reflux Disease, Adult  Gastroesophageal reflux (JENNIE) happens when acid from the stomach flows up into the tube that connects the mouth and the stomach (esophagus). Normally, food travels down the esophagus and stays in the stomach to be digested. With JENNIE, food and stomach acid sometimes move back up into the esophagus.  You may have a disease called gastroesophageal reflux disease (GERD) if the reflux:  Happens often.  Causes frequent or very bad symptoms.  Causes problems such as damage to the esophagus.  When this happens, the esophagus becomes sore and swollen. Over time, GERD can make small holes (ulcers) in the lining of the esophagus.  What are the causes?  This condition is caused by a problem with the muscle between the esophagus and the stomach. When this muscle is weak or not normal, it does not close properly to keep food and acid from coming back up from the stomach.  The muscle can be weak because of:  Tobacco use.  Pregnancy.  Having a certain type of hernia (hiatal hernia).  Alcohol use.  Certain foods and drinks, such as coffee, chocolate, onions, and  peppermint.  What increases the risk?  Being overweight.  Having a disease that affects your connective tissue.  Taking NSAIDs, such a ibuprofen.  What are the signs or symptoms?  Heartburn.  Difficult or painful swallowing.  The feeling of having a lump in the throat.  A bitter taste in the mouth.  Bad breath.  Having a lot of saliva.  Having an upset or bloated stomach.  Burping.  Chest pain. Different conditions can cause chest pain. Make sure you see your doctor if you have chest pain.  Shortness of breath or wheezing.  A long-term cough or a cough at night.  Wearing away of the surface of teeth (tooth enamel).  Weight loss.  How is this treated?  Making changes to your diet.  Taking medicine.  Having surgery.  Treatment will depend on how bad your symptoms are.  Follow these instructions at home:  Eating and drinking    Follow a diet as told by your doctor. You may need to avoid foods and drinks such as:  Coffee and tea, with or without caffeine.  Drinks that contain alcohol.  Energy drinks and sports drinks.  Bubbly (carbonated) drinks or sodas.  Chocolate and cocoa.  Peppermint and mint flavorings.  Garlic and onions.  Horseradish.  Spicy and acidic foods. These include peppers, chili powder, quiles powder, vinegar, hot sauces, and BBQ sauce.  Citrus fruit juices and citrus fruits, such as oranges, shad, and limes.  Tomato-based foods. These include red sauce, chili, salsa, and pizza with red sauce.  Fried and fatty foods. These include donuts, french fries, potato chips, and high-fat dressings.  High-fat meats. These include hot dogs, rib eye steak, sausage, ham, and martinez.  High-fat dairy items, such as whole milk, butter, and cream cheese.  Eat small meals often. Avoid eating large meals.  Avoid drinking large amounts of liquid with your meals.  Avoid eating meals during the 2-3 hours before bedtime.  Avoid lying down right after you eat.  Do not exercise right after you eat.  Lifestyle    Do not smoke or  use any products that contain nicotine or tobacco. If you need help quitting, ask your doctor.  Try to lower your stress. If you need help doing this, ask your doctor.  If you are overweight, lose an amount of weight that is healthy for you. Ask your doctor about a safe weight loss goal.  General instructions  Pay attention to any changes in your symptoms.  Take over-the-counter and prescription medicines only as told by your doctor.  Do not take aspirin, ibuprofen, or other NSAIDs unless your doctor says it is okay.  Wear loose clothes. Do not wear anything tight around your waist.  Raise (elevate) the head of your bed about 6 inches (15 cm). You may need to use a wedge to do this.  Avoid bending over if this makes your symptoms worse.  Keep all follow-up visits.  Contact a doctor if:  You have new symptoms.  You lose weight and you do not know why.  You have trouble swallowing or it hurts to swallow.  You have wheezing or a cough that keeps happening.  You have a hoarse voice.  Your symptoms do not get better with treatment.  Get help right away if:  You have sudden pain in your arms, neck, jaw, teeth, or back.  You suddenly feel sweaty, dizzy, or light-headed.  You have chest pain or shortness of breath.  You vomit and the vomit is green, yellow, or black, or it looks like blood or coffee grounds.  You faint.  Your poop (stool) is red, bloody, or black.  You cannot swallow, drink, or eat.  These symptoms may represent a serious problem that is an emergency. Do not wait to see if the symptoms will go away. Get medical help right away. Call your local emergency services (911 in the [place] Do not drive yourself to the hospital.  Summary  If a person has gastroesophageal reflux disease (GERD), food and stomach acid move back up into the esophagus and cause symptoms or problems such as damage to the esophagus.  Treatment will depend on how bad your symptoms are.  Follow a diet as told by your doctor.  Take all  medicines only as told by your doctor.  This information is not intended to replace advice given to you by your health care provider. Make sure you discuss any questions you have with your health care provider.  Document Revised: 06/28/2021 Document Reviewed: 06/28/2021  [employer] Patient Education © 2022 [employer]

## 2022-11-16 NOTE — PROGRESS NOTES
" Francia Santos, MSN, APRN, NP-C, JJ, CFRN  [facility] [place] [facility]  [address]            [place] [address]  Phone: [phone]  Fax: [phone]          Chief Complaint  ILD    Subjective    History of Present Illness    Donya Capellan presents to [facility] PULMONARY & CRITICAL CARE MEDICINE   History of Present Illness   The patient presents today for follow-up of shortness of breath and ILD.  HRCT from 11/22/2022 was reviewed with the patient today.  The impression shows reidentified interstitial fibrosis, peripheral and basal predominant, reticular opacities and mild cylindrical bronchiectasis as well as minimal groundglass change, no consolidation, nodules, or cystic change/honeycombing.  The patient reports that she does have an upcoming appointment with Dr. Rachel in regards to positive MULU and elevated anti-DNA.  The patient believes that her pleurisy has improved.  She notes lessened rib pain.  She uses albuterol HFA as needed.  She denies fevers, chills, cough or purulent sputum.  Objective   Vital Signs:   /80   Pulse 110   Ht 149.9 cm (59\")   Wt 60.8 kg (134 lb)   SpO2 96% Comment: RA  BMI 27.06 kg/m²     Physical Exam  Vitals reviewed.   Constitutional:       General: She is not in acute distress.     Appearance: She is well-developed and overweight.      Interventions: Face mask in place.   HENT:      Head: Normocephalic and atraumatic.   Eyes:      General: No scleral icterus.     Conjunctiva/sclera: Conjunctivae normal.      Pupils: Pupils are equal, round, and reactive to light.   Cardiovascular:      Rate and Rhythm: Normal rate.   Pulmonary:      Effort: Pulmonary effort is normal. No respiratory distress.      Breath sounds: Normal breath sounds. No wheezing or rales.   Musculoskeletal:         General: Normal range of motion.      Cervical back: Normal range of motion and neck supple.   Skin:     General: Skin is warm and dry.   Neurological: "      Mental Status: She is alert and oriented to person, place, and time.   Psychiatric:         Behavior: Behavior normal.         Thought Content: Thought content normal.         Judgment: Judgment normal.        Result Review :  The following data was reviewed by: DELPHINE Shin on 11/22/2022:    CT Chest Hi Resolution Diagnostic (11/22/2022 10:13)  IMPRESSION:  1. Reidentified interstitial fibrosis, peripheral and basal predominant.  This is characterized by reticular opacities and mild cylindrical  bronchiectasis as well as minimal groundglass change. Appearance/extent  of these fibrotic changes is stable.  2. No consolidation, nodules or cystic change/honeycombing.  Rest/Exercise Pulse Ox Values        Some values may be hidden. Unless noted otherwise, only the newest values recorded on each date are displayed.         Rest/Exercise Pulse Ox Results 3/8/22   Rest room air SAT % 97   Exercise room air SAT % 89               Results for orders placed during the hospital encounter of 04/11/22    Full Pulmonary Function Test With Bronchodilator    [facility] - Pulmonary Function Test    [address] [place] [address]  [id number]  [phone]    Patient : Donya Capellan  MRN : [id number]  CSN : [id number]  Pulmonologist : Eder Mcmahan MD  Date : 4/11/2022    ______________________________________________________________________    Interpretation :  1.  Spirometry is consistent with a moderate restrictive ventilatory defect with a coexisting decrease in midflows and peak expiratory flow.  2.  There is improvement in the patient's midflows and peak expiratory flows postbronchodilator so that midflows are now normal.  Peak expiratory flow is still diminished.  There otherwise is no significant change in spirometry postbronchodilator.  3.  Lung volumes confirm a moderate restrictive ventilatory defect.  There is also a decrease in inspiratory capacity.  4.  There is a moderate  diffusion impairment which when corrected for alveolar volume is normalized.      Eder Mcmahan MD           Assessment and Plan  Diagnoses and all orders for this visit:    1. Allergic rhinitis, unspecified seasonality, unspecified trigger (Primary)  Overview:  Singulair, Xyzal, Flonase    Assessment & Plan:  Continue current treatment regimen      2. Bronchiectasis without acute exacerbation (HCC)  Overview:  CT chest 4/11/22 - Underlying fibrotic interstitial lung disease characterized by subpleural and basal-predominant reticular opacities and mild traction bronchiectasis.     HRCT 11/22/2022-stable bronchiectasis    4/19/22 - IgA, IgM, IgE, IgG - WNL; mildly elevated IgG subclass 3; all other subclasses WNL    Albuterol HFA prn       Assessment & Plan:  Continue albuterol HFA as needed.  Trial Breztri.    Orders:  -     Budeson-Glycopyrrol-Formoterol (Breztri Aerosphere) 160-9-4.8 MCG/ACT aerosol inhaler; Inhale 2 puffs 2 (Two) Times a Day for 7 days.  Dispense: 2 each; Refill: 0    3. Restrictive lung disease  Overview:  Likely 2' ILD      4. ILD (interstitial lung disease) (HCC)  Overview:  4/11/22 - Underlying fibrotic interstitial lung disease characterized by subpleural and basal-predominant reticular opacities and mild traction bronchiectasis.    HRCT 11/22/2022, bilateral reticular opacities, peripheral and basal predominant, associated minimal cylindrical bronchiectasis, findings are stable, mild residual groundglass change scattered throughout the mid and lower lung fields, no cystic change or suspicious nodules, no honeycombing.     4/19/22 - ANCA - negative; +MULU and elevated antiDNA - referred to Dr. Rachel     Assessment & Plan:  Keep upcoming appointment with Dr. Rachel.      5. Gastroesophageal reflux disease, unspecified whether esophagitis present  Overview:  PPI-protonix         Follow Up  Francia Santos, APRN  11/22/2022  16:31 CST  Return in about 6 months (around 5/22/2023)  for FVL+DLCO.  Patient was given instructions and counseling regarding her condition or for health maintenance advice. Please see specific information pulled into the AVS if appropriate.   Please note that portions of this note were completed with a voice recognition program.

## 2022-11-22 ENCOUNTER — OFFICE VISIT (OUTPATIENT)
Dept: PULMONOLOGY | Facility: CLINIC | Age: 72
End: 2022-11-22

## 2022-11-22 ENCOUNTER — HOSPITAL ENCOUNTER (OUTPATIENT)
Dept: CT IMAGING | Facility: HOSPITAL | Age: 72
Discharge: HOME | End: 2022-11-22
Admitting: NURSE PRACTITIONER

## 2022-11-22 VITALS
WEIGHT: 134 LBS | DIASTOLIC BLOOD PRESSURE: 80 MMHG | SYSTOLIC BLOOD PRESSURE: 140 MMHG | HEIGHT: 59 IN | OXYGEN SATURATION: 96 % | BODY MASS INDEX: 27.01 KG/M2 | HEART RATE: 110 BPM

## 2022-11-22 DIAGNOSIS — J30.9 ALLERGIC RHINITIS, UNSPECIFIED SEASONALITY, UNSPECIFIED TRIGGER: Primary | Chronic | ICD-10-CM

## 2022-11-22 DIAGNOSIS — J84.9 ILD (INTERSTITIAL LUNG DISEASE): Chronic | ICD-10-CM

## 2022-11-22 DIAGNOSIS — J98.4 RESTRICTIVE LUNG DISEASE: Chronic | ICD-10-CM

## 2022-11-22 DIAGNOSIS — K21.9 GASTROESOPHAGEAL REFLUX DISEASE, UNSPECIFIED WHETHER ESOPHAGITIS PRESENT: Chronic | ICD-10-CM

## 2022-11-22 DIAGNOSIS — J47.9 BRONCHIECTASIS WITHOUT ACUTE EXACERBATION: Chronic | ICD-10-CM

## 2022-11-22 PROCEDURE — 71250 CT THORAX DX C-: CPT

## 2022-11-22 PROCEDURE — 99214 OFFICE O/P EST MOD 30 MIN: CPT | Performed by: NURSE PRACTITIONER

## 2022-11-22 RX ORDER — BUDESONIDE, GLYCOPYRROLATE, AND FORMOTEROL FUMARATE 160; 9; 4.8 UG/1; UG/1; UG/1
2 AEROSOL, METERED RESPIRATORY (INHALATION) 2 TIMES DAILY
Qty: 2 EACH | Refills: 0 | COMMUNITY
Start: 2022-11-22 | End: 2022-11-29

## 2023-05-26 NOTE — PROGRESS NOTES
" Francia Santos, MSN, APRN, NP-C, JJ, CFRN, EMT-B  [facility] [place] [facility]  [address]            [place] [address]  Phone: [phone]  Fax: [phone]          Chief Complaint  ILD (interstitial lung disease)    Subjective    History of Present Illness  Donya Capellan presents to [facility] PULMONARY & CRITICAL CARE MEDICINE   History of Present Illness    The patient presents today for follow-up of shortness of breath and ILD. She has known ILD, bronchiectasis, and restrictive lung disease.  She has a positive MULU and elevated anti-DNA.  Referral was made to Dr. Rachel however the patient was unable to keep this appointment.  Not want a new referral at this time.  The patient uses albuterol HFA as needed.  She does not wish to have maintenance inhaler. No increasing shortness of breath, no fever, no chills, no cough with purulent sputum.     Objective   Vital Signs:   /82   Pulse 104   Ht 147.3 cm (58\")   Wt 64.2 kg (141 lb 9.6 oz)   SpO2 94% Comment: RA  BMI 29.59 kg/m²     Physical Exam  Vitals reviewed.   Constitutional:       General: She is not in acute distress.     Appearance: She is well-developed and overweight.   HENT:      Head: Normocephalic and atraumatic.   Eyes:      General: No scleral icterus.     Conjunctiva/sclera: Conjunctivae normal.      Pupils: Pupils are equal, round, and reactive to light.   Cardiovascular:      Rate and Rhythm: Tachycardia present.   Pulmonary:      Effort: Pulmonary effort is normal. No respiratory distress.      Breath sounds: Normal breath sounds. No wheezing or rales.   Musculoskeletal:         General: Normal range of motion.      Cervical back: Normal range of motion and neck supple.   Skin:     General: Skin is warm and dry.   Neurological:      Mental Status: She is alert and oriented to person, place, and time.   Psychiatric:         Behavior: Behavior normal.         Thought Content: Thought content normal.    "      Judgment: Judgment normal.      Result Review :  The following data was reviewed by: DELPHINE Shin on 05/30/2023:  CT Chest Hi Resolution Diagnostic (11/22/2022 10:13)   IMPRESSION:  1. Reidentified interstitial fibrosis, peripheral and basal predominant. This is characterized by reticular opacities and mild cylindrical bronchiectasis as well as minimal groundglass change. Appearance/extent of these fibrotic changes is stable.  2. No consolidation, nodules or cystic change/honeycombing.  Rest/Exercise Pulse Ox Values          3/8/2022    09:30   Rest/Exercise Pulse Ox Results   Rest room air SAT % 97   Exercise room air SAT % 89     PFT Values          5/30/2023    10:30   Pre Drug PFT Results   FVC 72   FEV1 69   FEF 25-75% 64   FEV1/FVC 75   Other Tests PFT Results   DLCO 70   D/VAsb 98       Results for orders placed in visit on 05/30/23    Pulmonary Function Test    Narrative  Pulmonary Function Test  Performed by: Carla Sapp, RRT  Authorized by: Francia Santos APRN    Pre Drug % Predicted  FVC: 72%  FEV1: 69%  FEF 25-75%: 64%  FEV1/FVC: 75%  DLCO: 70%  D/VAsb: 98%    Interpretation  Spirometry  Spirometry shows mild restriction. There is reduced midflow suggesting small airway/airflow obstruction.  Review of FVL curve  Patient's effort is normal.  Diffusion Capacity  The patient's diffusion capacity is mildly reduced.  Diffusion capacity is normal when corrected for alveolar volume.      Results for orders placed during the hospital encounter of 04/11/22    Full Pulmonary Function Test With Bronchodilator    Narrative  [facility] - Pulmonary Function Test    [address] [place] [address]  [id number]  [phone]    Patient : Donya Capellan  MRN : [id number]  CSN : [id number]  Pulmonologist : Eder Mcmahan MD  Date : 4/11/2022    ______________________________________________________________________    Interpretation :  1.  Spirometry is consistent with a moderate  restrictive ventilatory defect with a coexisting decrease in midflows and peak expiratory flow.  2.  There is improvement in the patient's midflows and peak expiratory flows postbronchodilator so that midflows are now normal.  Peak expiratory flow is still diminished.  There otherwise is no significant change in spirometry postbronchodilator.  3.  Lung volumes confirm a moderate restrictive ventilatory defect.  There is also a decrease in inspiratory capacity.  4.  There is a moderate diffusion impairment which when corrected for alveolar volume is normalized.      Eder Mcmahan MD           Assessment and Plan  Diagnoses and all orders for this visit:    1. Restrictive lung disease (Primary)  Overview:  Likely 2' ILD and bronchiectasis.     Orders:  -     Pulmonary Function Test    2. Allergic rhinitis, unspecified seasonality, unspecified trigger  Overview:  Singulair, allegra, Flonase    Assessment & Plan:  Continue current treatment regimen.        3. Bronchiectasis without acute exacerbation  Overview:  CT chest 4/11/22 - Underlying fibrotic interstitial lung disease characterized by subpleural and basal-predominant reticular opacities and mild traction bronchiectasis.     HRCT 11/22/2022-stable bronchiectasis    4/19/22 - IgA, IgM, IgE, IgG - WNL; mildly elevated IgG subclass 3; all other subclasses WNL    Albuterol HFA prn       Assessment & Plan:  Continue current treatment regimen.        4. ILD (interstitial lung disease)  Overview:  4/11/22 - Underlying fibrotic interstitial lung disease characterized by subpleural and basal-predominant reticular opacities and mild traction bronchiectasis.    HRCT 11/22/2022, bilateral reticular opacities, peripheral and basal predominant, associated minimal cylindrical bronchiectasis, findings are stable, mild residual groundglass change scattered throughout the mid and lower lung fields, no cystic change or suspicious nodules, no honeycombing.     4/19/22 - ANCA  - negative; +MULU and elevated antiDNA - referred to Dr. Rachel-patient did not keep appt, does not want new referral at this time.     Assessment & Plan:  Continue to monitor FVL+DLCO and HRCT.       5. Gastroesophageal reflux disease, unspecified whether esophagitis present  Overview:  PPI-protonix     Assessment & Plan:  Continue current treatment regimen.            Follow Up  Francia Santos, APRN  5/30/2023  15:51 CDT    Return in about 6 months (around 11/30/2023).    Patient was given instructions and counseling regarding her condition or for health maintenance advice. Please see specific information pulled into the AVS if appropriate.     Please note that portions of this note were completed with a voice recognition program.

## 2023-05-30 ENCOUNTER — PROCEDURE VISIT (OUTPATIENT)
Dept: PULMONOLOGY | Facility: CLINIC | Age: 73
End: 2023-05-30

## 2023-05-30 ENCOUNTER — OFFICE VISIT (OUTPATIENT)
Dept: PULMONOLOGY | Facility: CLINIC | Age: 73
End: 2023-05-30

## 2023-05-30 VITALS
HEIGHT: 58 IN | DIASTOLIC BLOOD PRESSURE: 82 MMHG | BODY MASS INDEX: 29.72 KG/M2 | SYSTOLIC BLOOD PRESSURE: 156 MMHG | HEART RATE: 104 BPM | WEIGHT: 141.6 LBS | OXYGEN SATURATION: 94 %

## 2023-05-30 DIAGNOSIS — J98.4 RESTRICTIVE LUNG DISEASE: Primary | ICD-10-CM

## 2023-05-30 DIAGNOSIS — J30.9 ALLERGIC RHINITIS, UNSPECIFIED SEASONALITY, UNSPECIFIED TRIGGER: Chronic | ICD-10-CM

## 2023-05-30 DIAGNOSIS — J84.9 ILD (INTERSTITIAL LUNG DISEASE): Chronic | ICD-10-CM

## 2023-05-30 DIAGNOSIS — K21.9 GASTROESOPHAGEAL REFLUX DISEASE, UNSPECIFIED WHETHER ESOPHAGITIS PRESENT: Chronic | ICD-10-CM

## 2023-05-30 DIAGNOSIS — J47.9 BRONCHIECTASIS WITHOUT ACUTE EXACERBATION: Chronic | ICD-10-CM

## 2023-05-30 RX ORDER — FEXOFENADINE HCL 180 MG/1
180 TABLET ORAL DAILY
COMMUNITY

## 2023-05-30 NOTE — PATIENT INSTRUCTIONS
Bronchiectasis  Bronchiectasis is a condition in which the airways in the lungs (bronchi) are damaged and widened. The condition makes it hard for the lungs to get rid of mucus, and it causes mucus to gather in the bronchi. This condition often leads to lung infections, which can make the condition worse.  What are the causes?  You can be born with this condition, or you can develop it later in life. Common causes of this condition include:  Cystic fibrosis.  Repeated lung infections, such as pneumonia or tuberculosis.  An object or other blockage in the lungs.  Breathing in fluid, food, or other objects (aspiration).  A problem with the body's defense system (immune system) and lung structure that is present at birth (congenital).  Sometimes the cause is not known.  What are the signs or symptoms?  Common symptoms of this condition include:  A daily cough that brings up mucus and lasts for more than 3 weeks.  Lung infections that happen often.  Shortness of breath and wheezing.  Weakness and feeling tired (fatigue).  How is this diagnosed?  This condition is diagnosed with tests, such as:  Chest X-rays or CT scans. These are done to check for changes in the lungs.  Breathing tests. These are done to check how well your lungs are working.  A test of a sample of your saliva (sputum culture). This test is done to check for infection.  Blood tests and other tests. These are done to check for related diseases or causes.  How is this treated?  Treatment for this condition depends on the severity of the illness and its cause. Treatment may include:  Medicines that loosen mucus so it can be coughed up (mucolytics).  Medicines that relax the muscles of the bronchi (bronchodilators).  Antibiotic medicines to prevent or treat infection.  Physical therapy to help clear mucus from the lungs. Techniques may include:  Postural drainage. This is when you sit or lie in certain positions so that mucus can drain by gravity.  Chest  percussion. This involves tapping the chest or back with a cupped hand.  Chest vibration. For this therapy, a hand or special equipment vibrates your chest and back.  Surgery to remove the affected part of the lung. This may be done in severe cases.  Follow these instructions at home:  Medicines  Take over-the-counter and prescription medicines only as told by your health care provider.  If you were prescribed an antibiotic medicine, take it as told by your health care provider. Do not stop taking the antibiotic even if you start to feel better.  Avoid taking sedatives and antihistamines unless your health care provider tells you to take them. These medicines tend to thicken the mucus in the lungs.  Managing symptoms  Do breathing exercises or techniques to clear your lungs as told by your health care provider.  Consider using a cold steam vaporizer or humidifier in your room or home to help loosen secretions.  If you have a cough that gets worse at night, try sleeping in a semi-upright position.  General instructions  Get plenty of rest.  Drink enough fluid to keep your urine pale yellow.  Stay inside when pollution and ozone levels are high.  Stay up to date with vaccinations and immunizations.  Avoid cigarette smoke and other lung irritants.  Do not use any products that contain nicotine or tobacco. These products include cigarettes, chewing tobacco, and vaping devices, such as e-cigarettes. If you need help quitting, ask your health care provider.  Keep all follow-up visits. This is important.  Contact a health care provider if:  You cough up more sputum than before and the sputum is yellow or green in color.  You have a fever or chills.  You cannot control your cough and are losing sleep.  Get help right away if:  You cough up blood.  You have chest pain.  You have increasing shortness of breath.  You have pain that gets worse or is not controlled with medicines.  You have a fever and your symptoms suddenly get  worse.  These symptoms may be an emergency. Get help right away. Call 911.  Do not wait to see if the symptoms will go away.  Do not drive yourself to the hospital.  Summary  Bronchiectasis is a condition in which the airways in the lungs (bronchi) are damaged and widened. The condition makes it hard for the lungs to get rid of mucus, and it causes mucus to gather in the bronchi.  Treatment usually includes therapy to help clear mucus from the lungs.  Avoid cigarette smoke and other lung irritants.  Stay up to date with vaccinations and immunizations.  This information is not intended to replace advice given to you by your health care provider. Make sure you discuss any questions you have with your health care provider.  Document Revised: 08/31/2022 Document Reviewed: 07/19/2022  [employer] Patient Education © 2022 [employer]  Allergic Rhinitis, Adult  Allergic rhinitis is an allergic reaction that affects the mucous membrane inside the nose. The mucous membrane is the tissue that produces mucus.  There are two types of allergic rhinitis:  Seasonal. This type is also called hay fever and happens only during certain seasons.  Perennial. This type can happen at any time of the year.  Allergic rhinitis cannot be spread from person to person. This condition can be mild, moderate, or severe. It can develop at any age and may be outgrown.  What are the causes?  This condition is caused by allergens. These are things that can cause an allergic reaction. Allergens may differ for seasonal allergic rhinitis and perennial allergic rhinitis.  Seasonal allergic rhinitis is triggered by pollen. Pollen can come from grasses, trees, and weeds.  Perennial allergic rhinitis may be triggered by:  Dust mites.  Proteins in a pet's urine, saliva, or dander. Dander is dead skin cells from a pet.  Smoke, mold, or car fumes.  What increases the risk?  You are more likely to develop this condition if you have a family history of allergies  or other conditions related to allergies, including:  Allergic conjunctivitis. This is inflammation of parts of the eyes and eyelids.  Asthma. This condition affects the lungs and makes it hard to breathe.  Atopic dermatitis or eczema. This is long term (chronic) inflammation of the skin.  Food allergies.  What are the signs or symptoms?  Symptoms of this condition include:  Sneezing or coughing.  A stuffy nose (nasal congestion), itchy nose, or nasal discharge.  Itchy eyes and tearing of the eyes.  A feeling of mucus dripping down the back of your throat (postnasal drip).  Trouble sleeping.  Tiredness or fatigue.  Headache.  Sore throat.  How is this diagnosed?  This condition may be diagnosed with your symptoms, medical history, and physical exam. Your health care provider may check for related conditions, such as:  Asthma.  Pink eye. This is eye inflammation caused by infection (conjunctivitis).  Ear infection.  Upper respiratory infection. This is an infection in the nose, throat, or upper airways.  You may also have tests to find out which allergens trigger your symptoms. These may include skin tests or blood tests.  How is this treated?  There is no cure for this condition, but treatment can help control symptoms. Treatment may include:  Taking medicines that block allergy symptoms, such as corticosteroids and antihistamines. Medicine may be given as a shot, nasal spray, or pill.  Avoiding any allergens.  Being exposed again and again to tiny amounts of allergens to help you build a defense against allergens (immunotherapy). This is done if other treatments have not helped. It may include:  Allergy shots. These are injected medicines that have small amounts of allergen in them.  Sublingual immunotherapy. This involves taking small doses of a medicine with allergen in it under your tongue.  If these treatments do not work, your health care provider may prescribe newer, stronger medicines.  Follow these  instructions at home:  Avoiding allergens  Find out what you are allergic to and avoid those allergens. These are some things you can do to help avoid allergens:  If you have perennial allergies:  Replace carpet with wood, tile, or vinyl khanh. Carpet can trap dander and dust.  Do not smoke. Do not allow smoking in your home.  Change your heating and air conditioning filters at least once a month.  If you have seasonal allergies, take these steps during allergy season:  Keep windows closed as much as possible.  Plan outdoor activities when pollen counts are lowest. Check pollen counts before you plan outdoor activities.  When coming indoors, change clothing and shower before sitting on furniture or bedding.  If you have a pet in the house that produces allergens:  Keep the pet out of the bedroom.  Vacuum, sweep, and dust regularly.  General instructions  Take over-the-counter and prescription medicines only as told by your health care provider.  Drink enough fluid to keep your urine pale yellow.  Keep all follow-up visits as told by your health care provider. This is important.  Where to find more information  American Academy of Allergy, Asthma & Immunology: www.aaaai.org  Contact a health care provider if:  You have a fever.  You develop a cough that does not go away.  You make whistling sounds when you breathe (wheeze).  Your symptoms slow you down or stop you from doing your normal activities each day.  Get help right away if:  You have shortness of breath.  This symptom may represent a serious problem that is an emergency. Do not wait to see if the symptom will go away. Get medical help right away. Call your local emergency services (911 in the [place] Do not drive yourself to the hospital.  Summary  Allergic rhinitis may be managed by taking medicines as directed and avoiding allergens.  If you have seasonal allergies, keep windows closed as much as possible during allergy season.  Contact your health care  provider if you develop a fever or a cough that does not go away.  This information is not intended to replace advice given to you by your health care provider. Make sure you discuss any questions you have with your health care provider.  Document Revised: 02/05/2021 Document Reviewed: 12/15/2020  [employer] Patient Education © 2022 [employer]

## 2023-05-30 NOTE — PROCEDURES
Pulmonary Function Test  Performed by: Carla Sapp, RRT  Authorized by: Francia Santos APRN      Pre Drug % Predicted    FVC: 72%   FEV1: 69%   FEF 25-75%: 64%   FEV1/FVC: 75%   DLCO: 70%   D/VAsb: 98%    Interpretation   Spirometry   Spirometry shows mild restriction. There is reduced midflow suggesting small airway/airflow obstruction.   Review of FVL curve   Patient's effort is normal.   Diffusion Capacity  The patient's diffusion capacity is mildly reduced.  Diffusion capacity is normal when corrected for alveolar volume.

## 2023-09-18 ENCOUNTER — TRANSCRIBE ORDERS (OUTPATIENT)
Dept: ADMINISTRATIVE | Facility: HOSPITAL | Age: 73
End: 2023-09-18
Payer: MEDICARE

## 2023-09-18 DIAGNOSIS — R07.89 OTHER CHEST PAIN: Primary | ICD-10-CM

## 2023-09-18 DIAGNOSIS — R06.02 SHORTNESS OF BREATH: ICD-10-CM

## 2023-10-18 ENCOUNTER — HOSPITAL ENCOUNTER (OUTPATIENT)
Dept: CARDIOLOGY | Facility: HOSPITAL | Age: 73
Discharge: HOME | End: 2023-10-18
Payer: MEDICARE

## 2023-10-18 VITALS
WEIGHT: 132.28 LBS | SYSTOLIC BLOOD PRESSURE: 160 MMHG | HEART RATE: 96 BPM | DIASTOLIC BLOOD PRESSURE: 71 MMHG | HEIGHT: 59 IN | BODY MASS INDEX: 26.67 KG/M2

## 2023-10-18 LAB
BH CV STRESS BP STAGE 1: NORMAL
BH CV STRESS COMMENTS STAGE 1: NORMAL
BH CV STRESS DOSE REGADENOSON STAGE 1: 0.4
BH CV STRESS DURATION MIN STAGE 1: 0
BH CV STRESS DURATION SEC STAGE 1: 10
BH CV STRESS HR STAGE 1: 108
BH CV STRESS PROTOCOL 1: NORMAL
BH CV STRESS RECOVERY BP: 71 MMHG
BH CV STRESS RECOVERY HR: 103 BPM
BH CV STRESS STAGE 1: 1
LV EF NUC BP: 66 %
MAXIMAL PREDICTED HEART RATE: 145 BPM
PERCENT MAX PREDICTED HR: 74.48 %
STRESS BASELINE BP: NORMAL MMHG
STRESS BASELINE HR: 96 BPM
STRESS PERCENT HR: 88 %
STRESS POST EXERCISE DUR SEC: 10 SEC
STRESS POST PEAK BP: NORMAL MMHG
STRESS POST PEAK HR: 108 BPM
STRESS TARGET HR: 123 BPM

## 2023-10-18 PROCEDURE — 78452 HT MUSCLE IMAGE SPECT MULT: CPT

## 2023-10-18 PROCEDURE — 0 TECHNETIUM TETROFOSMIN KIT: Performed by: NURSE PRACTITIONER

## 2023-10-18 PROCEDURE — 25010000002 REGADENOSON 0.4 MG/5ML SOLUTION: Performed by: INTERNAL MEDICINE

## 2023-10-18 PROCEDURE — 93017 CV STRESS TEST TRACING ONLY: CPT

## 2023-10-18 PROCEDURE — A9502 TC99M TETROFOSMIN: HCPCS | Performed by: NURSE PRACTITIONER

## 2023-10-18 RX ORDER — REGADENOSON 0.08 MG/ML
0.4 INJECTION, SOLUTION INTRAVENOUS ONCE
Status: COMPLETED | OUTPATIENT
Start: 2023-10-18 | End: 2023-10-18

## 2023-10-18 RX ADMIN — TETROFOSMIN 1 DOSE: 1.38 INJECTION, POWDER, LYOPHILIZED, FOR SOLUTION INTRAVENOUS at 08:35

## 2023-10-18 RX ADMIN — REGADENOSON 0.4 MG: 0.08 INJECTION, SOLUTION INTRAVENOUS at 09:54

## 2023-10-18 RX ADMIN — TETROFOSMIN 1 DOSE: 1.38 INJECTION, POWDER, LYOPHILIZED, FOR SOLUTION INTRAVENOUS at 10:06

## 2023-10-26 ENCOUNTER — PREP FOR SURGERY (OUTPATIENT)
Dept: OTHER | Facility: HOSPITAL | Age: 73
End: 2023-10-26
Payer: MEDICARE

## 2023-10-26 ENCOUNTER — TELEPHONE (OUTPATIENT)
Dept: CARDIOLOGY | Facility: CLINIC | Age: 73
End: 2023-10-26
Payer: MEDICARE

## 2023-10-26 DIAGNOSIS — R07.2 PRECORDIAL PAIN: Primary | ICD-10-CM

## 2023-10-26 RX ORDER — SODIUM CHLORIDE 9 MG/ML
40 INJECTION, SOLUTION INTRAVENOUS AS NEEDED
OUTPATIENT
Start: 2023-10-26

## 2023-10-26 RX ORDER — ONDANSETRON 2 MG/ML
4 INJECTION INTRAMUSCULAR; INTRAVENOUS EVERY 6 HOURS PRN
OUTPATIENT
Start: 2023-10-26

## 2023-10-26 RX ORDER — SODIUM CHLORIDE 0.9 % (FLUSH) 0.9 %
10 SYRINGE (ML) INJECTION AS NEEDED
OUTPATIENT
Start: 2023-10-26

## 2023-10-26 RX ORDER — SODIUM CHLORIDE 0.9 % (FLUSH) 0.9 %
10 SYRINGE (ML) INJECTION EVERY 12 HOURS SCHEDULED
OUTPATIENT
Start: 2023-10-26

## 2023-10-26 RX ORDER — ASPIRIN 81 MG/1
81 TABLET ORAL DAILY
OUTPATIENT
Start: 2023-10-27

## 2023-10-26 RX ORDER — NITROGLYCERIN 0.4 MG/1
0.4 TABLET SUBLINGUAL
OUTPATIENT
Start: 2023-10-26

## 2023-10-26 RX ORDER — ASPIRIN 325 MG
325 TABLET ORAL ONCE
OUTPATIENT
Start: 2023-10-26 | End: 2023-10-26

## 2023-10-26 NOTE — TELEPHONE ENCOUNTER
RECORDS RECEIVED FROM [facility] ON PT NEEDING HEART CATH - PLEASE REVIEW / ORDER

## 2023-11-10 ENCOUNTER — HOSPITAL ENCOUNTER (OUTPATIENT)
Facility: HOSPITAL | Age: 73
Setting detail: HOSPITAL OUTPATIENT SURGERY
Discharge: HOME | End: 2023-11-10
Attending: INTERNAL MEDICINE | Admitting: INTERNAL MEDICINE
Payer: MEDICARE

## 2023-11-10 VITALS
SYSTOLIC BLOOD PRESSURE: 154 MMHG | BODY MASS INDEX: 26.39 KG/M2 | TEMPERATURE: 97.6 F | OXYGEN SATURATION: 97 % | DIASTOLIC BLOOD PRESSURE: 82 MMHG | WEIGHT: 134.4 LBS | HEART RATE: 80 BPM | RESPIRATION RATE: 18 BRPM | HEIGHT: 60 IN

## 2023-11-10 DIAGNOSIS — R07.2 PRECORDIAL PAIN: ICD-10-CM

## 2023-11-10 LAB
ALBUMIN SERPL-MCNC: 4.6 G/DL (ref 3.5–5.2)
ALBUMIN/GLOB SERPL: 1.6 G/DL
ALP SERPL-CCNC: 97 U/L (ref 39–117)
ALT SERPL W P-5'-P-CCNC: 21 U/L (ref 1–33)
ANION GAP SERPL CALCULATED.3IONS-SCNC: 13 MMOL/L (ref 5–15)
AST SERPL-CCNC: 24 U/L (ref 1–32)
BILIRUB SERPL-MCNC: 0.4 MG/DL (ref 0–1.2)
BUN SERPL-MCNC: 12 MG/DL (ref 8–23)
BUN/CREAT SERPL: 15.2 (ref 7–25)
CALCIUM SPEC-SCNC: 9.7 MG/DL (ref 8.6–10.5)
CHLORIDE SERPL-SCNC: 102 MMOL/L (ref 98–107)
CO2 SERPL-SCNC: 25 MMOL/L (ref 22–29)
CREAT SERPL-MCNC: 0.79 MG/DL (ref 0.57–1)
DEPRECATED RDW RBC AUTO: 41.7 FL (ref 37–54)
EGFRCR SERPLBLD CKD-EPI 2021: 78.1 ML/MIN/1.73
ERYTHROCYTE [DISTWIDTH] IN BLOOD BY AUTOMATED COUNT: 12.6 % (ref 12.3–15.4)
GLOBULIN UR ELPH-MCNC: 2.8 GM/DL
GLUCOSE SERPL-MCNC: 98 MG/DL (ref 65–99)
HCT VFR BLD AUTO: 46.2 % (ref 34–46.6)
HGB BLD-MCNC: 15.4 G/DL (ref 12–15.9)
MCH RBC QN AUTO: 30 PG (ref 26.6–33)
MCHC RBC AUTO-ENTMCNC: 33.3 G/DL (ref 31.5–35.7)
MCV RBC AUTO: 89.9 FL (ref 79–97)
PLATELET # BLD AUTO: 378 10*3/MM3 (ref 140–450)
PMV BLD AUTO: 8.5 FL (ref 6–12)
POTASSIUM SERPL-SCNC: 3.6 MMOL/L (ref 3.5–5.2)
PROT SERPL-MCNC: 7.4 G/DL (ref 6–8.5)
RBC # BLD AUTO: 5.14 10*6/MM3 (ref 3.77–5.28)
SODIUM SERPL-SCNC: 140 MMOL/L (ref 136–145)
WBC NRBC COR # BLD: 10.34 10*3/MM3 (ref 3.4–10.8)

## 2023-11-10 PROCEDURE — 25010000002 DIPHENHYDRAMINE PER 50 MG: Performed by: INTERNAL MEDICINE

## 2023-11-10 PROCEDURE — 25010000002 MIDAZOLAM PER 1 MG: Performed by: INTERNAL MEDICINE

## 2023-11-10 PROCEDURE — C1769 GUIDE WIRE: HCPCS | Performed by: INTERNAL MEDICINE

## 2023-11-10 PROCEDURE — S0260 H&P FOR SURGERY: HCPCS | Performed by: INTERNAL MEDICINE

## 2023-11-10 PROCEDURE — 25010000002 HEPARIN (PORCINE) 1000-0.9 UT/500ML-% SOLUTION: Performed by: INTERNAL MEDICINE

## 2023-11-10 PROCEDURE — 25010000002 FENTANYL CITRATE (PF) 50 MCG/ML SOLUTION: Performed by: INTERNAL MEDICINE

## 2023-11-10 PROCEDURE — 25510000001 IOPAMIDOL PER 1 ML: Performed by: INTERNAL MEDICINE

## 2023-11-10 PROCEDURE — 85027 COMPLETE CBC AUTOMATED: CPT | Performed by: NURSE PRACTITIONER

## 2023-11-10 PROCEDURE — C1894 INTRO/SHEATH, NON-LASER: HCPCS | Performed by: INTERNAL MEDICINE

## 2023-11-10 PROCEDURE — 99152 MOD SED SAME PHYS/QHP 5/>YRS: CPT | Performed by: INTERNAL MEDICINE

## 2023-11-10 PROCEDURE — 80053 COMPREHEN METABOLIC PANEL: CPT | Performed by: NURSE PRACTITIONER

## 2023-11-10 PROCEDURE — 93454 CORONARY ARTERY ANGIO S&I: CPT | Performed by: INTERNAL MEDICINE

## 2023-11-10 PROCEDURE — 25010000002 HEPARIN (PORCINE) 2000-0.9 UNIT/L-% SOLUTION: Performed by: INTERNAL MEDICINE

## 2023-11-10 RX ORDER — HEPARIN SODIUM 200 [USP'U]/100ML
INJECTION, SOLUTION INTRAVENOUS
Status: DISCONTINUED | OUTPATIENT
Start: 2023-11-10 | End: 2023-11-10 | Stop reason: HOSPADM

## 2023-11-10 RX ORDER — SODIUM CHLORIDE 9 MG/ML
100 INJECTION, SOLUTION INTRAVENOUS CONTINUOUS
Status: CANCELLED | OUTPATIENT
Start: 2023-11-10

## 2023-11-10 RX ORDER — FENTANYL CITRATE 50 UG/ML
INJECTION, SOLUTION INTRAMUSCULAR; INTRAVENOUS
Status: DISCONTINUED | OUTPATIENT
Start: 2023-11-10 | End: 2023-11-10 | Stop reason: HOSPADM

## 2023-11-10 RX ORDER — ACETAMINOPHEN 325 MG/1
650 TABLET ORAL EVERY 4 HOURS PRN
Status: CANCELLED | OUTPATIENT
Start: 2023-11-10

## 2023-11-10 RX ORDER — SODIUM CHLORIDE 0.9 % (FLUSH) 0.9 %
10 SYRINGE (ML) INJECTION EVERY 12 HOURS SCHEDULED
Status: CANCELLED | OUTPATIENT
Start: 2023-11-10

## 2023-11-10 RX ORDER — ASPIRIN 81 MG/1
324 TABLET, CHEWABLE ORAL ONCE
Status: DISCONTINUED | OUTPATIENT
Start: 2023-11-10 | End: 2023-11-10 | Stop reason: HOSPADM

## 2023-11-10 RX ORDER — SODIUM CHLORIDE 9 MG/ML
40 INJECTION, SOLUTION INTRAVENOUS AS NEEDED
Status: DISCONTINUED | OUTPATIENT
Start: 2023-11-10 | End: 2023-11-10 | Stop reason: HOSPADM

## 2023-11-10 RX ORDER — ONDANSETRON 2 MG/ML
4 INJECTION INTRAMUSCULAR; INTRAVENOUS EVERY 6 HOURS PRN
Status: DISCONTINUED | OUTPATIENT
Start: 2023-11-10 | End: 2023-11-10 | Stop reason: HOSPADM

## 2023-11-10 RX ORDER — LIDOCAINE HYDROCHLORIDE 20 MG/ML
INJECTION, SOLUTION INFILTRATION; PERINEURAL
Status: DISCONTINUED | OUTPATIENT
Start: 2023-11-10 | End: 2023-11-10 | Stop reason: HOSPADM

## 2023-11-10 RX ORDER — SODIUM CHLORIDE 9 MG/ML
40 INJECTION, SOLUTION INTRAVENOUS AS NEEDED
Status: CANCELLED | OUTPATIENT
Start: 2023-11-10

## 2023-11-10 RX ORDER — ASPIRIN 81 MG/1
324 TABLET, CHEWABLE ORAL ONCE
Status: COMPLETED | OUTPATIENT
Start: 2023-11-10 | End: 2023-11-10

## 2023-11-10 RX ORDER — SODIUM CHLORIDE 0.9 % (FLUSH) 0.9 %
10 SYRINGE (ML) INJECTION EVERY 12 HOURS SCHEDULED
Status: DISCONTINUED | OUTPATIENT
Start: 2023-11-10 | End: 2023-11-10 | Stop reason: HOSPADM

## 2023-11-10 RX ORDER — SODIUM CHLORIDE 0.9 % (FLUSH) 0.9 %
10 SYRINGE (ML) INJECTION AS NEEDED
Status: CANCELLED | OUTPATIENT
Start: 2023-11-10

## 2023-11-10 RX ORDER — ASPIRIN 81 MG/1
TABLET, CHEWABLE ORAL
Status: COMPLETED
Start: 2023-11-10 | End: 2023-11-10

## 2023-11-10 RX ORDER — DIPHENHYDRAMINE HYDROCHLORIDE 50 MG/ML
INJECTION INTRAMUSCULAR; INTRAVENOUS
Status: DISCONTINUED | OUTPATIENT
Start: 2023-11-10 | End: 2023-11-10 | Stop reason: HOSPADM

## 2023-11-10 RX ORDER — VERAPAMIL HYDROCHLORIDE 2.5 MG/ML
INJECTION, SOLUTION INTRAVENOUS
Status: DISCONTINUED | OUTPATIENT
Start: 2023-11-10 | End: 2023-11-10 | Stop reason: HOSPADM

## 2023-11-10 RX ORDER — NITROGLYCERIN 0.4 MG/1
0.4 TABLET SUBLINGUAL
Status: DISCONTINUED | OUTPATIENT
Start: 2023-11-10 | End: 2023-11-10 | Stop reason: HOSPADM

## 2023-11-10 RX ORDER — MIDAZOLAM HYDROCHLORIDE 1 MG/ML
INJECTION INTRAMUSCULAR; INTRAVENOUS
Status: DISCONTINUED | OUTPATIENT
Start: 2023-11-10 | End: 2023-11-10 | Stop reason: HOSPADM

## 2023-11-10 RX ORDER — SODIUM CHLORIDE 0.9 % (FLUSH) 0.9 %
10 SYRINGE (ML) INJECTION AS NEEDED
Status: DISCONTINUED | OUTPATIENT
Start: 2023-11-10 | End: 2023-11-10 | Stop reason: HOSPADM

## 2023-11-10 RX ADMIN — ASPIRIN 324 MG: 81 TABLET, CHEWABLE ORAL at 15:05

## 2023-11-10 NOTE — H&P
LOS: 0 days   Patient Care Team:  Norbert Alvarenga MD as PCP - General (Internal Medicine)  Axel Jiménez MD as Consulting Physician (Otolaryngology)  Harry Dumont PA as Physician Assistant (Physician Assistant)  Malu Ramsey PA as Referring Physician (Dermatology)  Francia Santos APRN as Nurse Practitioner (Pulmonary Disease)    Chief Complaint: Shortness of breath     Subjective    Donya Capellan is a 73 y.o. female who is being seen    Subjective     Chest pain both with exertion as well as at rest.  Feels episodes of chest pain occur more often with exertion  Moderate substernal,   Pressure like   Chest pain non pleuritic  Chest pain non positional and non gustatory   Lasts less than 5 minutes    Started more than 3 months ago  Occurs once or twice a week on the average but can be variable in frequency  No associated diaphoresis    No associated nausea  No radiation    Relieved with rest or spontaneously  Not positional    No change with intake of food or antacids  No change with breathing  Mild to moderate associated dyspnea    No similar chest pain episodes in the past              Review of Systems   Constitutional: No chills   Has fatigue   No fever.   HENT: Negative.    Eyes: Negative.    Respiratory: Negative for cough,   No chest wall soreness,   Shortness of breath,   no wheezing, no stridor.    Cardiovascular: As above  Gastrointestinal: Negative for abdominal distention,  No abdominal pain,   No blood in stool,   No constipation,   No diarrhea,   No nausea   No vomiting.   Endocrine: Negative.    Genitourinary: Negative for difficulty urinating, dysuria, flank pain and hematuria.   Musculoskeletal: Negative.    Skin: Negative for rash and wound.   Allergic/Immunologic: Negative.    Neurological: Negative for dizziness, syncope, weakness,   No light-headedness  No  headaches.   Hematological: Does not bruise/bleed easily.   Psychiatric/Behavioral: Negative for  agitation or behavioral problems,   No confusion,   the patient is  nervous/anxious.       History:   Past Medical History:   Diagnosis Date    Anxiety     Arthritis     Asthma     Cancer     DDD (degenerative disc disease), cervical     DDD (degenerative disc disease), lumbar     DDD (degenerative disc disease), thoracic     History of pancreatitis     History of skin cancer     Hyperlipidemia     Hypertension     Skin cancer     Stroke 04/09/2017    RESIDUAL R HAND NUMBNESS/TINGLING    Tachycardia      Past Surgical History:   Procedure Laterality Date    CHOLECYSTECTOMY      COLONOSCOPY  09/19/2013    Internal hemorrhoids; Normal ileum; Repeat 10 years    COLONOSCOPY  03/05/2008    Internal hemorrhoids; Repeat 7 years    ENDOSCOPY N/A 6/23/2021    Procedure: ESOPHAGOGASTRODUODENOSCOPY WITH ANESTHESIA;  Surgeon: Fariha Weber MD;  Location: [facility] ENDOSCOPY;  Service: Gastroenterology;  Laterality: N/A;  pre GERD; epigastric pain; dysphagia  post; stricture  Norbert Alvarenga MD    FLAP HEAD/NECK Bilateral 4/24/2019    Procedure: POSSIBLE FLAP OR GRAFT;  Surgeon: Axel Jiménez MD;  Location: [facility] OR;  Service: ENT    FLAP HEAD/NECK Right 4/9/2021    Procedure: possible flap or graft;  Surgeon: Axel Jiménez MD;  Location: [facility] OR;  Service: ENT;  Laterality: Right;    HEAD/NECK LESION/CYST EXCISION Bilateral 4/24/2019    Procedure: 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 Excision of neoplasm of uncertain origin of the left nasal ala with complex closure  ;  Surgeon: Axel Jiménez MD;  Location: [facility] OR;  Service: ENT    HEAD/NECK LESION/CYST EXCISION Right 4/9/2021    Procedure: Excision of basal cell carcinoma of the right chin with transposition flap ;  Surgeon: Axel Jiménez MD;  Location: [facility] OR;  Service: ENT;  Laterality: Right;    TUBAL ABDOMINAL LIGATION       Social History     Socioeconomic History    Marital status:  "   Tobacco Use    Smoking status: Never     Passive exposure: Past    Smokeless tobacco: Never   Vaping Use    Vaping Use: Never used   Substance and Sexual Activity    Alcohol use: Yes     Comment: socially    Drug use: No    Sexual activity: Defer     Family History   Problem Relation Age of Onset    COPD Mother     Cancer Sister     Stroke Brother     Stroke Maternal Grandfather     Colon polyps Daughter 51    Colon cancer Neg Hx     Esophageal cancer Neg Hx        Labs:  WBC WBC   Date Value Ref Range Status   11/10/2023 10.34 3.40 - 10.80 10*3/mm3 Final      HGB Hemoglobin   Date Value Ref Range Status   11/10/2023 15.4 12.0 - 15.9 g/dL Final      HCT Hematocrit   Date Value Ref Range Status   11/10/2023 46.2 34.0 - 46.6 % Final      Platelets Platelets   Date Value Ref Range Status   11/10/2023 378 140 - 450 10*3/mm3 Final      MCV MCV   Date Value Ref Range Status   11/10/2023 89.9 79.0 - 97.0 fL Final        Results from last 7 days   Lab Units 11/10/23  1241   SODIUM mmol/L 140   POTASSIUM mmol/L 3.6   CHLORIDE mmol/L 102   CO2 mmol/L 25.0   BUN mg/dL 12   CREATININE mg/dL 0.79   CALCIUM mg/dL 9.7   BILIRUBIN mg/dL 0.4   ALK PHOS U/L 97   ALT (SGPT) U/L 21   AST (SGOT) U/L 24   GLUCOSE mg/dL 98     Lab Results   Component Value Date    TROPONINI <0.012 04/09/2017     PT/INR:  No results found for: \"PROTIME\"/No results found for: \"INR\"    Imaging Results (Last 72 Hours)       ** No results found for the last 72 hours. **            Objective     No Known Allergies    Medication Review: Performed  Current Facility-Administered Medications   Medication Dose Route Frequency Provider Last Rate Last Admin    aspirin chewable tablet 324 mg  324 mg Oral Once Abdullahi Roth MD        nitroglycerin (NITROSTAT) SL tablet 0.4 mg  0.4 mg Sublingual Q5 Min PRN Karoline Jacobs APRN        ondansetron (ZOFRAN) injection 4 mg  4 mg Intravenous Q6H PRN Karoline Jacobs APRN        sodium chloride 0.9 % flush 10 " "mL  10 mL Intravenous Q12H Karoline Jacobs, APRN        sodium chloride 0.9 % flush 10 mL  10 mL Intravenous PRN Karoline Jacobs N, APRN        sodium chloride 0.9 % infusion 40 mL  40 mL Intravenous PRN Karoline Jacobs N, APRN           Vital Sign Min/Max for last 24 hours  Temp  Min: 97.6 °F (36.4 °C)  Max: 97.6 °F (36.4 °C)   BP  Min: 156/76  Max: 156/76   Pulse  Min: 93  Max: 93   Resp  Min: 16  Max: 16   SpO2  Min: 99 %  Max: 99 %   No data recorded   Weight  Min: 61 kg (134 lb 6.4 oz)  Max: 61 kg (134 lb 6.4 oz)     Flowsheet Rows      Flowsheet Row First Filed Value   Admission Height 152.4 cm (60\") Documented at 11/10/2023 1250   Admission Weight 61 kg (134 lb 6.4 oz) Documented at 11/10/2023 1250            Results for orders placed during the hospital encounter of 04/09/17    Adult Transthoracic Echo Complete    Interpretation Summary  · Left ventricular function is normal. Calculated EF = 60.3%.  · No evidence of a patent foramen ovale.  · No significant valvular abnormalities are identified.      Physical Exam:    General Appearance: Awake, alert, in no acute distress  Eyes: Pupils equal and reactive    Ears: Appear intact with no abnormalities noted  Nose: Nares normal, no drainage  Neck: supple, trachea midline, no carotid bruit and no JVD  Back: no kyphosis present,    Lungs: respirations regular, respirations even and respirations unlabored  Heart: normal S1, S2, no significant murmurs   No gallops or rubs  no rub and no click  Abdomen: normal bowel sounds, no tenderness   Skin: no bleeding, bruising or rash  Extremities: no cyanosis  Psychiatric/Behavioral: Negative for agitation, behavioral problems, confusion, the patient does  appear to be nervous/anxious.       Results Review:   I reviewed the patient's new clinical results.  I reviewed the patient's new imaging results and agree with the interpretation.  I reviewed the patient's other test results and agree with the interpretation  I " personally viewed and interpreted the patient's EKG/Telemetry data    Discussed with patient  Updated patient regarding any new or relevant abnormalities on review of records or any new findings on physical exam.   Mentioned to patient about purpose of visit and desirable health short and long term goals and objectives.     Reviewed available prior notes, consults, prior visits, laboratory findings, radiology and cardiology relevant reports.   Updated chart as applicable.   I have reviewed the patient's medical history in detail and updated the computerized patient record as relevant.          Assessment & Plan       Precordial pain  shortness of breath     Plan    Recommend cardiac catheterization, selective coronary angiography, left ventriculography and percutaneous coronary intervention with application of arteriotomy hemostatic closure device.    I discussed cardiac catheterization, the procedure, risks (including bleeding, infection, vascular damage [including minor oozing, bruising, bleeding, and up to and including but not limited to the need for vascular surgery, emergency cardiothoracic surgery, contrast reaction, renal failure, respiratory failure, heart attack, stroke, arrhythmia and even death), benefits, and alternatives and the patient has voiced understanding and is willing to proceed.    Adequate pre-hydration and post cardiac catheterization hydration.  Premedications as required and indicated for cardiac catheterization.    No contraindication to drug eluting stent placement if required  Further recommendations pending results of cardiac catheterization         Abdullahi Roth MD  11/10/23  14:53 CST    EMR Dragon/Transcription was used to dictate part of this note

## 2023-11-20 ENCOUNTER — TELEPHONE (OUTPATIENT)
Dept: PULMONOLOGY | Facility: CLINIC | Age: 73
End: 2023-11-20
Payer: MEDICARE

## 2024-01-23 ENCOUNTER — TELEPHONE (OUTPATIENT)
Dept: GASTROENTEROLOGY | Facility: CLINIC | Age: 74
End: 2024-01-23
Payer: MEDICARE

## 2024-01-23 NOTE — TELEPHONE ENCOUNTER
I have sent 2 letters to pt re: recall colon and have had no response. I called and spoke to her about it-she tells me she doesn't think she wants to have another colonoscopy because the prep made her very sick last time. I explained to her we have newer/smaller volume preps so she said she would think about it-if she wants to proceed, she will call back to schedule OV. Otherwise, she is aware I am removing her from my recall list.

## 2024-08-27 ENCOUNTER — OFFICE VISIT (OUTPATIENT)
Dept: GASTROENTEROLOGY | Facility: CLINIC | Age: 74
End: 2024-08-27
Payer: MEDICARE

## 2024-08-27 VITALS
TEMPERATURE: 97.5 F | SYSTOLIC BLOOD PRESSURE: 124 MMHG | HEART RATE: 103 BPM | DIASTOLIC BLOOD PRESSURE: 76 MMHG | HEIGHT: 58 IN | OXYGEN SATURATION: 96 % | BODY MASS INDEX: 27.92 KG/M2 | WEIGHT: 133 LBS

## 2024-08-27 DIAGNOSIS — Z83.719 FH: COLON POLYPS: ICD-10-CM

## 2024-08-27 DIAGNOSIS — Z12.11 ENCOUNTER FOR SCREENING FOR MALIGNANT NEOPLASM OF COLON: Primary | ICD-10-CM

## 2024-08-27 DIAGNOSIS — Z79.01 ANTICOAGULATED BY ANTICOAGULATION TREATMENT: ICD-10-CM

## 2024-08-27 NOTE — PROGRESS NOTES
Primary Physician: Norbert Alvarenga MD    Chief Complaint   Patient presents with    Colon Cancer Screening     Pt presents today for colon recall-last colon was 9/19/2013; Family history of colon polyps       Subjective     Donya Capellan is a 74 y.o. female.    HPI  Colonoscopy Screening  Patient here today to 10-year colonoscopy screening.    9/19/2013 Colonoscopy revealing internal hemorrhoids otherwise unremarkable.    Patient denies any change in bowel habits.  No diarrhea, constipation, abdominal pain or rectal bleeding.    No family history of colon cancer to report.  Daughter has had colon polyps diagnosed at age 51    Past Medical History:   Diagnosis Date    Anxiety     Arthritis     Asthma     DDD (degenerative disc disease), cervical     DDD (degenerative disc disease), lumbar     DDD (degenerative disc disease), thoracic     Family history of colonic polyps     History of pancreatitis     History of skin cancer     History of skin cancer     Hyperlipidemia     Hypertension     Stroke 04/09/2017    RESIDUAL R HAND NUMBNESS/TINGLING    Tachycardia        Past Surgical History:   Procedure Laterality Date    CARDIAC CATHETERIZATION N/A 11/10/2023    Procedure: Left Heart Cath;  Surgeon: Abdullahi Roth MD;  Location: [facility] CATH INVASIVE LOCATION;  Service: Cardiology;  Laterality: N/A;    CHOLECYSTECTOMY      COLONOSCOPY  09/19/2013    Internal hemorrhoids; Normal ileum; Repeat 10 years    COLONOSCOPY  03/05/2008    Internal hemorrhoids; Repeat 7 years    ENDOSCOPY N/A 06/23/2021    Non-obstructing Schatzki ring-Dilated; Small HH; Normal stomach; Normal examined duodenum; No specimens collected    FLAP HEAD/NECK Bilateral 04/24/2019    Procedure: POSSIBLE FLAP OR GRAFT;  Surgeon: Axel Jiménez MD;  Location: [facility] OR;  Service: ENT    FLAP HEAD/NECK Right 04/09/2021    Procedure: possible flap or graft;  Surgeon: Axel Jiménez MD;  Location: [facility] OR;  Service: ENT;  Laterality: Right;     HEAD/NECK LESION/CYST EXCISION Bilateral 04/24/2019    Procedure: 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 Excision of neoplasm of uncertain origin of the left nasal ala with complex closure  ;  Surgeon: Axel Jiménez MD;  Location:  [facility] OR;  Service: ENT    HEAD/NECK LESION/CYST EXCISION Right 04/09/2021    Procedure: Excision of basal cell carcinoma of the right chin with transposition flap ;  Surgeon: Axel Jiménez MD;  Location:  [facility] OR;  Service: ENT;  Laterality: Right;    TUBAL ABDOMINAL LIGATION          Current Outpatient Medications:     acetaminophen (TYLENOL) 500 MG tablet, Take 2 tablets by mouth Every 6 (Six) Hours As Needed for Mild Pain., Disp: , Rfl:     albuterol sulfate  (90 Base) MCG/ACT inhaler, Inhale 2 puffs Every 6 (Six) Hours As Needed., Disp: , Rfl:     atorvastatin (LIPITOR) 10 MG tablet, Take 1 tablet by mouth Daily., Disp: , Rfl:     Biotin 1 MG capsule, Take 1 capsule by mouth Daily., Disp: , Rfl:     Cholecalciferol 125 MCG (5000 UT) tablet, Take 1 tablet every day by oral route., Disp: , Rfl:     clopidogrel (PLAVIX) 75 MG tablet, Take 1 tablet by mouth Daily., Disp: , Rfl:     dilTIAZem (TIAZAC) 180 MG 24 hr capsule, Take 1 capsule by mouth Daily., Disp: , Rfl:     losartan (COZAAR) 25 MG tablet, Take 1 tablet by mouth Daily., Disp: , Rfl:     montelukast (SINGULAIR) 10 MG tablet, Take 1 tablet by mouth Daily., Disp: , Rfl:     nortriptyline (PAMELOR) 10 MG capsule, Take 1 capsule by mouth 4 (Four) Times a Day., Disp: , Rfl:     pantoprazole (PROTONIX) 40 MG EC tablet, Take 1 tablet by mouth once daily, Disp: 90 tablet, Rfl: 0    fluticasone (Flonase) 50 MCG/ACT nasal spray, 2 sprays into the nostril(s) as directed by provider Daily for 30 days., Disp: 16 g, Rfl: 11    No Known Allergies    Social History     Socioeconomic History    Marital status:    Tobacco Use    Smoking status: Never     Passive  "exposure: Past    Smokeless tobacco: Never   Vaping Use    Vaping status: Never Used   Substance and Sexual Activity    Alcohol use: Yes     Comment: Socially    Drug use: No    Sexual activity: Defer       Family History   Problem Relation Age of Onset    COPD Mother     Cancer Sister     Stroke Brother     Stroke Maternal Grandfather     Colon polyps Daughter 51    Colon cancer Neg Hx     Esophageal cancer Neg Hx     Liver cancer Neg Hx     Liver disease Neg Hx     Stomach cancer Neg Hx     Rectal cancer Neg Hx        Review of Systems   Constitutional:  Negative for unexpected weight change.   Respiratory:  Positive for shortness of breath.         SOB with exertion   Cardiovascular:  Negative for chest pain.       Objective     /76 (BP Location: Left arm, Patient Position: Sitting, Cuff Size: Adult)   Pulse 103   Temp 97.5 °F (36.4 °C) (Infrared)   Ht 147.3 cm (58\")   Wt 60.3 kg (133 lb)   LMP  (LMP Unknown)   SpO2 96%   Breastfeeding No   BMI 27.80 kg/m²     Physical Exam  Vitals reviewed.   Constitutional:       Appearance: Normal appearance.   Cardiovascular:      Rate and Rhythm: Normal rate and regular rhythm.      Heart sounds: Normal heart sounds.   Pulmonary:      Effort: Pulmonary effort is normal.      Breath sounds: Rales present.      Comments: Few fine expiratory rales  Neurological:      Mental Status: She is alert.         Lab Results - Last 18 Months   Lab Units 11/10/23  1241   GLUCOSE mg/dL 98   BUN mg/dL 12   CREATININE mg/dL 0.79   SODIUM mmol/L 140   POTASSIUM mmol/L 3.6   CHLORIDE mmol/L 102   CO2 mmol/L 25.0   TOTAL PROTEIN g/dL 7.4   ALBUMIN g/dL 4.6   ALT (SGPT) U/L 21   AST (SGOT) U/L 24   ALK PHOS U/L 97   BILIRUBIN mg/dL 0.4   GLOBULIN gm/dL 2.8       Lab Results - Last 18 Months   Lab Units 11/10/23  1241   HEMOGLOBIN g/dL 15.4   HEMATOCRIT % 46.2   MCV fL 89.9   WBC 10*3/mm3 10.34   RDW % 12.6   MPV fL 8.5   PLATELETS 10*3/mm3 378 "             IMPRESSION/PLAN:    Assessment & Plan      Problem List Items Addressed This Visit       Anticoagulated by anticoagulation treatment    Overview     Patient is on Plavix         FH: colon polyps    Overview     Daughter at age 51         Encounter for screening for malignant neoplasm of colon - Primary    Overview     9/2013 unremarkable colonoscopy except hemorrhoids         Relevant Orders    Case Request (Completed)     Colonoscopy per Dr Gus Collins Prep sample given  Will call Dr Alvarenga for approval to stop Plavix 5 days prior to procedure        ..The risks, benefits, and alternatives of colonoscopy were reviewed with the patient today.  Risks including perforation of the colon possibly requiring surgery or colostomy.  Additional risks include risk of bleeding from biopsies or removal of colon tissue.  There is also the risk of a drug reaction or problems with anesthesia.  This will be discussed with the further by the anesthesia team on the day of the procedure.  Lastly there is a possibility of missing a colon polyp or cancer.  The benefits include the diagnosis and management of disease of the colon and rectum.  Alternatives to colonoscopy include barium enema, laboratory testing, radiographic evaluation, or no intervention.  The patient verbalizes understanding and agrees.    In accordance with requirements under the Affordable Care Act, [facility] has provided pricing for all hospital services and items on each of its websites. However, a patient's actual cost may differ based on the services the patient receives to meet individual healthcare needs and based on the benefits provided under the patient’s insurance coverage.        Juany Franco, APRN  08/27/24  10:43 CDT    Part of this note may be an electronic transcription/translation of spoken language to printed text.

## 2024-09-19 ENCOUNTER — TELEPHONE (OUTPATIENT)
Dept: GASTROENTEROLOGY | Facility: CLINIC | Age: 74
End: 2024-09-19

## 2024-09-19 NOTE — TELEPHONE ENCOUNTER
Caller: Donya Capellan    Relationship to patient: Self    Best call back number: [phone]    Chief complaint:     Type of visit: CLS    Requested date: PLEASE CALL TO RESCHEDULE     If rescheduling, when is the original appointment: 10/10/24     Additional notes: WOULD LIKE TO RESCHEDULE FOR END OF NOVEMBER FIRST PART OF DECEMBER

## 2025-01-20 ENCOUNTER — TRANSCRIBE ORDERS (OUTPATIENT)
Dept: ADMINISTRATIVE | Facility: HOSPITAL | Age: 75
End: 2025-01-20
Payer: MEDICARE

## 2025-01-20 DIAGNOSIS — M54.41 LOW BACK PAIN WITH RIGHT-SIDED SCIATICA, UNSPECIFIED BACK PAIN LATERALITY, UNSPECIFIED CHRONICITY: Primary | ICD-10-CM

## 2025-01-22 ENCOUNTER — ANESTHESIA EVENT (OUTPATIENT)
Dept: GASTROENTEROLOGY | Facility: HOSPITAL | Age: 75
End: 2025-01-22
Payer: MEDICARE

## 2025-01-22 ENCOUNTER — HOSPITAL ENCOUNTER (OUTPATIENT)
Facility: HOSPITAL | Age: 75
Setting detail: HOSPITAL OUTPATIENT SURGERY
Discharge: HOME | End: 2025-01-22
Attending: INTERNAL MEDICINE | Admitting: INTERNAL MEDICINE
Payer: MEDICARE

## 2025-01-22 ENCOUNTER — ANESTHESIA (OUTPATIENT)
Dept: GASTROENTEROLOGY | Facility: HOSPITAL | Age: 75
End: 2025-01-22
Payer: MEDICARE

## 2025-01-22 VITALS
WEIGHT: 130 LBS | DIASTOLIC BLOOD PRESSURE: 83 MMHG | TEMPERATURE: 96.4 F | HEIGHT: 58 IN | BODY MASS INDEX: 27.29 KG/M2 | OXYGEN SATURATION: 99 % | HEART RATE: 99 BPM | SYSTOLIC BLOOD PRESSURE: 160 MMHG | RESPIRATION RATE: 20 BRPM

## 2025-01-22 DIAGNOSIS — Z12.11 ENCOUNTER FOR SCREENING FOR MALIGNANT NEOPLASM OF COLON: ICD-10-CM

## 2025-01-22 PROCEDURE — 25810000003 SODIUM CHLORIDE 0.9 % SOLUTION: Performed by: ANESTHESIOLOGY

## 2025-01-22 PROCEDURE — 25010000002 PROPOFOL 10 MG/ML EMULSION: Performed by: NURSE ANESTHETIST, CERTIFIED REGISTERED

## 2025-01-22 PROCEDURE — 45385 COLONOSCOPY W/LESION REMOVAL: CPT | Performed by: INTERNAL MEDICINE

## 2025-01-22 PROCEDURE — 25810000003 SODIUM CHLORIDE 0.9 % SOLUTION: Performed by: NURSE ANESTHETIST, CERTIFIED REGISTERED

## 2025-01-22 PROCEDURE — 88305 TISSUE EXAM BY PATHOLOGIST: CPT | Performed by: INTERNAL MEDICINE

## 2025-01-22 PROCEDURE — 25010000002 LIDOCAINE PF 2% 2 % SOLUTION: Performed by: NURSE ANESTHETIST, CERTIFIED REGISTERED

## 2025-01-22 RX ORDER — SODIUM CHLORIDE 0.9 % (FLUSH) 0.9 %
10 SYRINGE (ML) INJECTION EVERY 12 HOURS SCHEDULED
Status: CANCELLED | OUTPATIENT
Start: 2025-01-22

## 2025-01-22 RX ORDER — SODIUM CHLORIDE 0.9 % (FLUSH) 0.9 %
10 SYRINGE (ML) INJECTION AS NEEDED
Status: DISCONTINUED | OUTPATIENT
Start: 2025-01-22 | End: 2025-01-22 | Stop reason: HOSPADM

## 2025-01-22 RX ORDER — SODIUM CHLORIDE 0.9 % (FLUSH) 0.9 %
10 SYRINGE (ML) INJECTION AS NEEDED
Status: CANCELLED | OUTPATIENT
Start: 2025-01-22

## 2025-01-22 RX ORDER — SODIUM CHLORIDE 9 MG/ML
INJECTION, SOLUTION INTRAVENOUS CONTINUOUS PRN
Status: DISCONTINUED | OUTPATIENT
Start: 2025-01-22 | End: 2025-01-22 | Stop reason: SURG

## 2025-01-22 RX ORDER — SODIUM CHLORIDE 9 MG/ML
500 INJECTION, SOLUTION INTRAVENOUS ONCE
Status: COMPLETED | OUTPATIENT
Start: 2025-01-22 | End: 2025-01-22

## 2025-01-22 RX ORDER — LIDOCAINE HYDROCHLORIDE 20 MG/ML
INJECTION, SOLUTION EPIDURAL; INFILTRATION; INTRACAUDAL; PERINEURAL AS NEEDED
Status: DISCONTINUED | OUTPATIENT
Start: 2025-01-22 | End: 2025-01-22 | Stop reason: SURG

## 2025-01-22 RX ORDER — PROPOFOL 10 MG/ML
VIAL (ML) INTRAVENOUS AS NEEDED
Status: DISCONTINUED | OUTPATIENT
Start: 2025-01-22 | End: 2025-01-22 | Stop reason: SURG

## 2025-01-22 RX ORDER — SODIUM CHLORIDE 9 MG/ML
40 INJECTION, SOLUTION INTRAVENOUS AS NEEDED
Status: CANCELLED | OUTPATIENT
Start: 2025-01-22

## 2025-01-22 RX ADMIN — PROPOFOL 50 MG: 10 INJECTION, EMULSION INTRAVENOUS at 09:39

## 2025-01-22 RX ADMIN — SODIUM CHLORIDE: 9 INJECTION, SOLUTION INTRAVENOUS at 09:23

## 2025-01-22 RX ADMIN — PROPOFOL 50 MG: 10 INJECTION, EMULSION INTRAVENOUS at 09:26

## 2025-01-22 RX ADMIN — SODIUM CHLORIDE 500 ML: 9 INJECTION, SOLUTION INTRAVENOUS at 09:00

## 2025-01-22 RX ADMIN — PROPOFOL 50 MG: 10 INJECTION, EMULSION INTRAVENOUS at 09:36

## 2025-01-22 RX ADMIN — PROPOFOL 50 MG: 10 INJECTION, EMULSION INTRAVENOUS at 09:27

## 2025-01-22 RX ADMIN — LIDOCAINE HYDROCHLORIDE 100 MG: 20 INJECTION, SOLUTION EPIDURAL; INFILTRATION; INTRACAUDAL; PERINEURAL at 09:25

## 2025-01-22 RX ADMIN — PROPOFOL 50 MG: 10 INJECTION, EMULSION INTRAVENOUS at 09:33

## 2025-01-22 RX ADMIN — PROPOFOL 50 MG: 10 INJECTION, EMULSION INTRAVENOUS at 09:29

## 2025-01-22 RX ADMIN — PROPOFOL 100 MG: 10 INJECTION, EMULSION INTRAVENOUS at 09:25

## 2025-01-22 NOTE — ANESTHESIA PREPROCEDURE EVALUATION
Anesthesia Evaluation     Patient summary reviewed   no history of anesthetic complications:   NPO Solid Status: > 8 hours  NPO Liquid Status: > 6 hours           Airway   Mallampati: II  TM distance: >3 FB  Neck ROM: full  Dental - normal exam     Pulmonary    (+) asthma,  (-) COPD, sleep apnea, not a smoker  Cardiovascular   Exercise tolerance: excellent (>7 METS)    (+) hypertension, hyperlipidemia  (-) pacemaker, past MI, angina, cardiac stents      Neuro/Psych  (+) CVA (RUE weak)  (-) seizures, TIA  GI/Hepatic/Renal/Endo    (+) GERD  (-) liver disease, no renal disease, diabetes    Musculoskeletal     (+) back pain  Abdominal    Substance History      OB/GYN          Other   arthritis,   history of cancer                      Anesthesia Plan    ASA 3     MAC     intravenous induction     Anesthetic plan, risks, benefits, and alternatives have been provided, discussed and informed consent has been obtained with: patient.

## 2025-01-22 NOTE — H&P
Chief Complaint:   Colon cancer screening    Subjective     HPI:   Last colonoscopy done 9/2018.  Daughter had colon polyps at age 51.    Past Medical History:   Past Medical History:   Diagnosis Date    Anxiety     Arthritis     Asthma     DDD (degenerative disc disease), cervical     DDD (degenerative disc disease), lumbar     DDD (degenerative disc disease), thoracic     Family history of colonic polyps     GERD (gastroesophageal reflux disease)     History of pancreatitis     History of skin cancer     History of skin cancer     Hyperlipidemia     Hypertension     Stroke 04/09/2017    RESIDUAL R HAND NUMBNESS/TINGLING    Tachycardia        Past Surgical History:  Past Surgical History:   Procedure Laterality Date    CARDIAC CATHETERIZATION N/A 11/10/2023    Procedure: Left Heart Cath;  Surgeon: Abdullahi Roth MD;  Location: [facility] CATH INVASIVE LOCATION;  Service: Cardiology;  Laterality: N/A;    CHOLECYSTECTOMY      COLONOSCOPY  09/19/2013    Internal hemorrhoids; Normal ileum; Repeat 10 years    COLONOSCOPY  03/05/2008    Internal hemorrhoids; Repeat 7 years    ENDOSCOPY N/A 06/23/2021    Non-obstructing Schatzki ring-Dilated; Small HH; Normal stomach; Normal examined duodenum; No specimens collected    FLAP HEAD/NECK Bilateral 04/24/2019    Procedure: POSSIBLE FLAP OR GRAFT;  Surgeon: Axel Jiménez MD;  Location: [facility] OR;  Service: ENT    FLAP HEAD/NECK Right 04/09/2021    Procedure: possible flap or graft;  Surgeon: Axel Jiménez MD;  Location: [facility] OR;  Service: ENT;  Laterality: Right;    HEAD/NECK LESION/CYST EXCISION Bilateral 04/24/2019    Procedure: 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 Excision of neoplasm of uncertain origin of the left nasal ala with complex closure  ;  Surgeon: Axel Jiménez MD;  Location: [facility] OR;  Service: ENT    HEAD/NECK LESION/CYST EXCISION Right 04/09/2021    Procedure: Excision of basal cell  carcinoma of the right chin with transposition flap ;  Surgeon: Axel Jiménez MD;  Location:  [facility] OR;  Service: ENT;  Laterality: Right;    TUBAL ABDOMINAL LIGATION          Family History:  Family History   Problem Relation Age of Onset    COPD Mother     Cancer Sister     Stroke Brother     Stroke Maternal Grandfather     Colon polyps Daughter 51    Colon cancer Neg Hx     Esophageal cancer Neg Hx     Liver cancer Neg Hx     Liver disease Neg Hx     Stomach cancer Neg Hx     Rectal cancer Neg Hx        Social History:   reports that she has never smoked. She has been exposed to tobacco smoke. She has never used smokeless tobacco. She reports current alcohol use. She reports that she does not use drugs.    Medications:   Medications Prior to Admission   Medication Sig Dispense Refill Last Dose/Taking    atorvastatin (LIPITOR) 10 MG tablet Take 1 tablet by mouth Daily.   1/21/2025    Biotin 1 MG capsule Take 1 capsule by mouth Daily.   Past Week    Cholecalciferol 125 MCG (5000 UT) tablet Take 1 tablet every day by oral route.   Past Week    dilTIAZem (TIAZAC) 180 MG 24 hr capsule Take 1 capsule by mouth Daily.   1/22/2025 Morning    losartan (COZAAR) 25 MG tablet Take 1 tablet by mouth Daily.   1/22/2025 Morning    montelukast (SINGULAIR) 10 MG tablet Take 1 tablet by mouth Daily.   1/21/2025    nortriptyline (PAMELOR) 50 MG capsule Take 10 mg by mouth Daily.   1/21/2025    pantoprazole (PROTONIX) 40 MG EC tablet Take 1 tablet by mouth once daily 90 tablet 0 1/21/2025    acetaminophen (TYLENOL) 500 MG tablet Take 2 tablets by mouth Every 6 (Six) Hours As Needed for Mild Pain.       albuterol sulfate  (90 Base) MCG/ACT inhaler Inhale 2 puffs Every 6 (Six) Hours As Needed.   Unknown    clopidogrel (PLAVIX) 75 MG tablet Take 1 tablet by mouth Daily.   1/16/2025    fluticasone (Flonase) 50 MCG/ACT nasal spray 2 sprays into the nostril(s) as directed by provider Daily for 30 days. 16 g 11   "      Allergies:  Patient has no known allergies.    ROS:    Resp: No SOA  Cardiovascular: No CP      Objective     /90 (BP Location: Right arm, Patient Position: Sitting)   Pulse 111   Temp 96.4 °F (35.8 °C) (Tympanic)   Resp 18   Ht 147.3 cm (58\")   Wt 59 kg (130 lb)   LMP  (LMP Unknown)   SpO2 98%   BMI 27.17 kg/m²     Physical Exam   Constitutional: Patient is oriented to person, place, and in no distress.  Pulmonary/Chest: No distress.  No audible wheezes  Psychiatric: Mood, memory, affect and judgment appear normal.     Assessment & Plan     Diagnosis:  Colon cancer screening  Family history of colon polyps in first-degree relative less than 60 years old.    Anticipated Surgical Procedure:  Colonoscopy    The risks, benefits, and alternatives of colonoscopy were reviewed with the patient today.  Risks including perforation of the colon possibly requiring surgery or colostomy.  Additional risks include risk of bleeding from biopsies or removal of colon tissue.  There is also the risk of a drug reaction or problems with anesthesia.  This will be discussed with the patient further by the anesthesia team on the day of the procedure.  Lastly there is a possibility of missing a colon polyp or cancer.  The benefits include the diagnosis and management of disease of the colon and rectum.  Alternatives to colonoscopy include barium enema, laboratory testing, radiographic evaluation, or no intervention.  The patient verbalizes understanding and agrees.        Please note that portions of this note were completed with a voice recognition program.         "

## 2025-01-22 NOTE — ANESTHESIA POSTPROCEDURE EVALUATION
"Patient: Donya Capellan    Procedure Summary       Date: 01/22/25 Room / Location: [facility] ENDOSCOPY 5 / [facility]    Anesthesia Start: 0923 Anesthesia Stop: 0946    Procedure: COLONOSCOPY WITH ANESTHESIA Diagnosis:       Encounter for screening for malignant neoplasm of colon      (Encounter for screening for malignant neoplasm of colon [Z12.11])    Surgeons: Fariha Weber MD Provider: Jim Dia CRNA    Anesthesia Type: MAC ASA Status: 3            Anesthesia Type: MAC    Vitals  Vitals Value Taken Time   /83 01/22/25 1012   Temp     Pulse 99 01/22/25 1010   Resp 20 01/22/25 1010   SpO2 99 % 01/22/25 1003   Vitals shown include unfiled device data.        Post Anesthesia Care and Evaluation    Patient location during evaluation: PHASE II  Patient participation: complete - patient participated  Level of consciousness: awake and awake and alert  Pain score: 0  Pain management: adequate    Airway patency: patent  Anesthetic complications: No anesthetic complications  PONV Status: none  Cardiovascular status: acceptable  Respiratory status: acceptable  Hydration status: acceptable    Comments: Patient discharged according to acceptable Jenise score per RN assessment. See nursing records for further information.     Blood pressure 160/83, pulse 99, temperature 96.4 °F (35.8 °C), temperature source Tympanic, resp. rate 20, height 147.3 cm (58\"), weight 59 kg (130 lb), SpO2 99%, not currently breastfeeding.      "

## 2025-01-24 PROBLEM — Z86.0101 PERSONAL HISTORY OF ADENOMATOUS AND SERRATED COLON POLYPS: Status: ACTIVE | Noted: 2024-08-27

## 2025-02-04 ENCOUNTER — HOSPITAL ENCOUNTER (OUTPATIENT)
Dept: MRI IMAGING | Facility: HOSPITAL | Age: 75
Discharge: HOME | End: 2025-02-04
Admitting: INTERNAL MEDICINE
Payer: MEDICARE

## 2025-02-04 DIAGNOSIS — M54.41 LOW BACK PAIN WITH RIGHT-SIDED SCIATICA, UNSPECIFIED BACK PAIN LATERALITY, UNSPECIFIED CHRONICITY: ICD-10-CM

## 2025-02-04 PROCEDURE — A9579 GAD-BASE MR CONTRAST NOS,1ML: HCPCS | Performed by: INTERNAL MEDICINE

## 2025-02-04 PROCEDURE — 25510000001 GADOPICLENOL 0.5 MMOL/ML SOLUTION: Performed by: INTERNAL MEDICINE

## 2025-02-04 PROCEDURE — 72158 MRI LUMBAR SPINE W/O & W/DYE: CPT

## 2025-02-04 RX ADMIN — GADOPICLENOL 6 ML: 485.1 INJECTION INTRAVENOUS at 14:50

## 2025-07-28 NOTE — PROGRESS NOTES
Name:  Donya Capellan  YOB: 1950  Location: [facility]  Location Address: [address] [place] [address], [facility], [address] [place] [address]  Location Phone: [phone]    Chief Complaint  Chief Complaint   Patient presents with   • Skin Lesion       History of Present Illness  The patient  is a 68 y.o. female who is referred by Ashleigh Jiménez MD for preoperative evaluation. She complains of a lesionbilateral nose present for several year(s)  It has not been  biopsied.  The lesions have gotten larger and are sore.               Past Medical History:   Diagnosis Date   • Anxiety    • Asthma    • Hyperlipidemia    • Hypertension    • Stroke (CMS/HCC) 2017       Past Surgical History:   Procedure Laterality Date   • COLONOSCOPY     • GALLBLADDER SURGERY     • TUBAL ABDOMINAL LIGATION           Current Outpatient Medications:   •  atorvastatin (LIPITOR) 10 MG tablet, , Disp: , Rfl:   •  CARTIA  MG 24 hr capsule, , Disp: , Rfl:   •  clopidogrel (PLAVIX) 75 MG tablet, , Disp: , Rfl:   •  losartan (COZAAR) 25 MG tablet, Take 25 mg by mouth Daily., Disp: , Rfl:   •  montelukast (SINGULAIR) 10 MG tablet, Take 10 mg by mouth Every Night., Disp: , Rfl:   •  nortriptyline (PAMELOR) 10 MG capsule, Take 40 mg by mouth Every Night., Disp: , Rfl:     Patient has no known allergies.    Family History   Problem Relation Age of Onset   • COPD Mother    • Cancer Sister    • Stroke Brother    • Stroke Maternal Grandfather        Social History     Socioeconomic History   • Marital status:      Spouse name: Not on file   • Number of children: Not on file   • Years of education: Not on file   • Highest education level: Not on file   Tobacco Use   • Smoking status: Never Smoker   • Smokeless tobacco: Never Used   Substance and Sexual Activity   • Alcohol use: Yes     Comment: socially   • Drug use: No   • Sexual activity: No       Review of Systems   Constitutional: Negative for activity change,  appetite change, chills, diaphoresis, fatigue, fever and unexpected weight change.   HENT: Negative for congestion, dental problem, drooling, ear discharge, ear pain, facial swelling, hearing loss, mouth sores, nosebleeds, postnasal drip, rhinorrhea, sinus pressure, sneezing, sore throat, tinnitus, trouble swallowing and voice change.    Eyes: Negative.    Respiratory: Negative.    Cardiovascular: Negative.    Gastrointestinal: Negative.    Endocrine: Negative.    Skin: Negative.         Bilateral nasal ala lesions   Allergic/Immunologic: Negative for environmental allergies, food allergies and immunocompromised state.   Neurological: Negative.    Hematological: Negative.    Psychiatric/Behavioral: Negative.        Vitals:    04/02/19 1041   BP: 133/68   Pulse: 93   Resp: 20   Temp: 98.2 °F (36.8 °C)       Objective     Physical Exam   Constitutional: Vital signs are normal. She appears well-developed and well-nourished. No distress.   HENT:   Head: Normocephalic and atraumatic.   Right Ear: External ear normal.   Left Ear: External ear normal.   Nose:       Eyes: Conjunctivae and EOM are normal. Pupils are equal, round, and reactive to light. No scleral icterus.   Pulmonary/Chest: Effort normal.   Neurological: She is alert. No cranial nerve deficit.   Skin: Skin is warm and dry. No rash noted. She is not diaphoretic. No erythema. No pallor.   Psychiatric: She has a normal mood and affect. Her behavior is normal. Judgment and thought content normal.   Vitals reviewed.      Assessment/Plan   Donya was seen today for skin lesion.    Diagnoses and all orders for this visit:    Neoplasm of uncertain behavior of skin of nose  -     Case Request; Standing  -     Comprehensive Metabolic Panel; Future  -     CBC and Differential; Future  -     Protime-INR; Future  -     APTT; Future  -     ECG 12 Lead; Future  -     XR Chest 2 View; Future  -     Case Request    Anticoagulated   -     Protime-INR; Future  -     APTT;  Future    Other orders  -     Follow Anesthesia Guidelines / Standing Orders; Future  -     Obtain Informed Consent  -     Follow Anesthesia Guidelines / Standing Orders; Standing  -     Verify NPO Status; Standing  -     Obtain Informed Consent; Standing  -     SCD (Sequential Compression Device) - To Be Placed on Patient in Pre-Op; Standing  -     NPO Diet; Standing  -     Provide Patient With Instructions on NPO Status; Future  -     Patient to Void Prior to Transfer to OR; Standing      EXCISION LESION OF UNCERTAIN SIGNIFICANCE OF THE RIGHT AND LEFT NASAL ALA WITH POSSIBLE FROZEN SECTION AND POSSIBLE FLAP OR GRAFT (Bilateral), POSSIBLE FLAP OR GRAFT (Bilateral)  Orders Placed This Encounter   Procedures   • XR Chest 2 View     Standing Status:   Future     Standing Expiration Date:   4/1/2020     Order Specific Question:   Reason for Exam:     Answer:   HYPERTENSION   • Comprehensive Metabolic Panel     Standing Status:   Future     Standing Expiration Date:   4/1/2020   • Protime-INR     Standing Status:   Future     Standing Expiration Date:   4/1/2020   • APTT     Standing Status:   Future     Standing Expiration Date:   4/1/2020   • Follow Anesthesia Guidelines / Standing Orders     Standing Status:   Future     Standing Expiration Date:   4/2/2020   • Obtain Informed Consent     EXCISION LESION with possible flap or graft-     Order Specific Question:   Informed Consent Given For     Answer:   EXCISION LESION OF UNCERTAIN SIGNIFICANCE OF THE RIGHT AND LEFT NASAL ALA WITH POSSIBLE FROZEN SECTION AND POSSIBLE FLAP OR GRAFT   • Provide Patient With Instructions on NPO Status     Standing Status:   Future   • ECG 12 Lead     Standing Status:   Future     Standing Expiration Date:   4/1/2020     Order Specific Question:   Reason for Exam:     Answer:   HYPERTENSION   • CBC and Differential     Standing Status:   Future     Standing Expiration Date:   4/1/2020     Order Specific Question:   Manual Differential      Answer:   No     Return for Follow-up post-operatively as directed.       Patient Instructions   Discussion of skin lesion. Discussed risks, benefits, alternatives, and possible complications of excision of the skin lesion with reconstruction utilizing local tissue rearrangement, full-thickness skin grafting, or local interpolated flaps. Risks include, but are not limited too: bleeding, infection, hematoma, recurrence, need for additional procedures, flap failure, cosmetic deformity. Patient understands risks and would like to proceed with surgery.              [Alert] : alert [Well Nourished] : well nourished [Healthy Appearance] : healthy appearance [No Acute Distress] : no acute distress [Well Developed] : well developed [EOMI] : extra ocular movement intact [Normal Hearing] : hearing was normal [Supple] : the neck was supple [Thyroid Not Enlarged] : the thyroid was not enlarged [No Respiratory Distress] : no respiratory distress [No Accessory Muscle Use] : no accessory muscle use [Normal Rate and Effort] : normal respiratory rate and effort [Clear to Auscultation] : lungs were clear to auscultation bilaterally [Normal S1, S2] : normal S1 and S2 [Normal Rate] : heart rate was normal [Regular Rhythm] : with a regular rhythm [No Edema] : no peripheral edema [Normal Bowel Sounds] : normal bowel sounds [Not Tender] : non-tender [Not Distended] : not distended [Soft] : abdomen soft [No Stigmata of Cushings Syndrome] : no stigmata of Cushings Syndrome [No Involuntary Movements] : no involuntary movements were seen [Oriented x3] : oriented to person, place, and time [Normal Affect] : the affect was normal [Normal Mood] : the mood was normal

## (undated) DEVICE — SOL IRR NACL 0.9PCT BT 1000ML

## (undated) DEVICE — GLIDESHEATH SLENDER STAINLESS STEEL KIT: Brand: GLIDESHEATH SLENDER

## (undated) DEVICE — CANN NASL ETCO2 LO/FLO A/

## (undated) DEVICE — GLV SURG BIOGEL M LTX PF 7 1/2

## (undated) DEVICE — SUP ARMBRD ART/LINE BLU

## (undated) DEVICE — ESOPHAGEAL BALLOON DILATATION CATHETER: Brand: CRE FIXED WIRE

## (undated) DEVICE — SUT MNCRYL 4/0 P3 18IN UD MCP494G

## (undated) DEVICE — THE CHANNEL CLEANING BRUSH IS A NYLON FLEXI BRUSH ATTACHED TO A FLEXIBLE PLASTIC SHEATH DESIGNED TO SAFELY REMOVE DEBRIS FROM FLEXIBLE ENDOSCOPES.

## (undated) DEVICE — THE SINGLE USE ETRAP – POLYP TRAP IS USED FOR SUCTION RETRIEVAL OF ENDOSCOPICALLY REMOVED POLYPS.: Brand: ETRAP

## (undated) DEVICE — SOLIDIFIER LIQUI LOC PLUS 2000CC

## (undated) DEVICE — PK TURNOVER RM ADV

## (undated) DEVICE — PAD, DEFIB, ADULT, RADIOTRANS, PHYSIO: Brand: MEDLINE

## (undated) DEVICE — YANKAUER,BULB TIP WITH VENT: Brand: ARGYLE

## (undated) DEVICE — DEV TORQ GW THE/GRIP 0.14 TO 0.018IN

## (undated) DEVICE — SENSR O2 OXIMAX FNGR A/ 18IN NONSTR

## (undated) DEVICE — CUFF,BP,DISP,1 TUBE,ADULT,HP: Brand: MEDLINE

## (undated) DEVICE — DEV COMPR RADL PRELUDESYNCEZ 30ML 32CM

## (undated) DEVICE — GW ZIPWIRE STD ANGL .035IN 150CM

## (undated) DEVICE — GW STARTER FXD CORE J .035 3X260CM 3MM

## (undated) DEVICE — CONMED SCOPE SAVER BITE BLOCK, 20X27 MM: Brand: SCOPE SAVER

## (undated) DEVICE — DEV INFL ALLIANCE2 SYS

## (undated) DEVICE — SPNG GZ WOVN 4X4IN 12PLY 10/BX STRL

## (undated) DEVICE — SUT ETHLN 6/0 P3 18IN 1698G

## (undated) DEVICE — SUT ETHLN 5/0 P3 18IN 698H

## (undated) DEVICE — PREP SOL POVIDONE/IODINE BT 4OZ

## (undated) DEVICE — MODEL AT P65, P/N 701554-001KIT CONTENTS: HAND CONTROLLER, 3-WAY HIGH-PRESSURE STOPCOCK WITH ROTATING END AND PREMIUM HIGH-PRESSURE TUBING: Brand: ANGIOTOUCH® KIT

## (undated) DEVICE — Device: Brand: DEFENDO AIR/WATER/SUCTION AND BIOPSY VALVE

## (undated) DEVICE — RADIFOCUS OPTITORQUE ANGIOGRAPHIC CATHETER: Brand: OPTITORQUE

## (undated) DEVICE — MASK,OXYGEN,MED CONC,ADLT,7' TUB, UC: Brand: PENDING

## (undated) DEVICE — ARGYLE YANKAUER BULB TIP WITH VENT: Brand: ARGYLE

## (undated) DEVICE — PK ENT HD AND NK 30

## (undated) DEVICE — DRSNG SURESITE WNDW 4X4.5

## (undated) DEVICE — MODEL BT2000 P/N 700287-012KIT CONTENTS: MANIFOLD WITH SALINE AND CONTRAST PORTS, SALINE TUBING WITH SPIKE AND HAND SYRINGE, TRANSDUCER: Brand: BT2000 AUTOMATED MANIFOLD KIT

## (undated) DEVICE — Device: Brand: SINGLE USE ELECTROSURGICAL SNARE SD-400

## (undated) DEVICE — DRAPE,ANGIO,BRACH,STERILE,38X44: Brand: MEDLINE

## (undated) DEVICE — KT NDL GUIDE STRL 18GA

## (undated) DEVICE — PK CATH CARD 30 CA/4

## (undated) DEVICE — TBG SMPL FLTR LINE NASL 02/C02 A/ BX/100